# Patient Record
Sex: MALE | Race: WHITE | NOT HISPANIC OR LATINO | Employment: FULL TIME | URBAN - METROPOLITAN AREA
[De-identification: names, ages, dates, MRNs, and addresses within clinical notes are randomized per-mention and may not be internally consistent; named-entity substitution may affect disease eponyms.]

---

## 2023-08-21 PROBLEM — U07.1 COVID-19: Status: ACTIVE | Noted: 2023-08-21

## 2023-08-21 PROBLEM — E11.65 POORLY CONTROLLED DIABETES MELLITUS (MULTI): Status: RESOLVED | Noted: 2023-08-21 | Resolved: 2023-08-21

## 2023-08-21 PROBLEM — I10 HYPERTENSION: Status: ACTIVE | Noted: 2023-08-21

## 2023-08-21 PROBLEM — M62.89 HAMSTRING TIGHTNESS: Status: ACTIVE | Noted: 2023-08-21

## 2023-08-21 PROBLEM — E78.5 HYPERLIPIDEMIA: Status: ACTIVE | Noted: 2023-08-21

## 2023-08-21 PROBLEM — E66.9 OBESITY: Status: ACTIVE | Noted: 2023-08-21

## 2023-08-21 PROBLEM — M76.51 PATELLAR TENDINITIS OF RIGHT KNEE: Status: ACTIVE | Noted: 2023-08-21

## 2023-08-21 PROBLEM — E11.65 POORLY CONTROLLED DIABETES MELLITUS (MULTI): Status: ACTIVE | Noted: 2023-08-21

## 2023-08-21 PROBLEM — F41.9 ANXIETY: Status: ACTIVE | Noted: 2023-08-21

## 2023-08-21 PROBLEM — G47.33 OSA (OBSTRUCTIVE SLEEP APNEA): Status: ACTIVE | Noted: 2023-08-21

## 2023-08-21 PROBLEM — S83.001A ACQUIRED SUBLUXATION OF RIGHT PATELLA: Status: ACTIVE | Noted: 2023-08-21

## 2023-08-21 PROBLEM — M76.31 ILIOTIBIAL BAND SYNDROME AFFECTING RIGHT LOWER LEG: Status: ACTIVE | Noted: 2023-08-21

## 2023-08-21 PROBLEM — E11.9 TYPE 2 DIABETES MELLITUS (MULTI): Status: ACTIVE | Noted: 2023-08-21

## 2023-08-21 PROBLEM — M1A.0710 CHRONIC GOUT OF RIGHT FOOT: Status: ACTIVE | Noted: 2023-08-21

## 2023-08-21 PROBLEM — M21.70 LEG LENGTH INEQUALITY: Status: ACTIVE | Noted: 2023-08-21

## 2023-08-21 PROBLEM — G47.19 EXCESSIVE DAYTIME SLEEPINESS: Status: ACTIVE | Noted: 2023-08-21

## 2023-08-21 PROBLEM — Q66.6 VALGUS DEFORMITY OF BOTH FEET: Status: ACTIVE | Noted: 2023-08-21

## 2023-08-21 PROBLEM — E79.0 HYPERURICEMIA: Status: ACTIVE | Noted: 2023-08-21

## 2023-08-21 PROBLEM — H43.10 VITREOUS HEMORRHAGE (MULTI): Status: ACTIVE | Noted: 2023-08-21

## 2023-08-21 PROBLEM — E87.6 HYPOKALEMIA: Status: ACTIVE | Noted: 2023-08-21

## 2023-08-21 PROBLEM — M22.2X9 PATELLA-FEMORAL SYNDROME: Status: ACTIVE | Noted: 2023-08-21

## 2023-08-21 PROBLEM — D89.2 HYPERGAMMAGLOBULINEMIA: Status: ACTIVE | Noted: 2023-08-21

## 2023-08-21 PROBLEM — M25.561 PAIN IN RIGHT KNEE: Status: ACTIVE | Noted: 2023-08-21

## 2023-08-21 PROBLEM — S83.8X1A INJURY OF MENISCUS OF RIGHT KNEE: Status: ACTIVE | Noted: 2023-08-21

## 2023-08-21 RX ORDER — AMLODIPINE BESYLATE 5 MG/1
5 TABLET ORAL DAILY
COMMUNITY
End: 2023-11-27

## 2023-08-21 RX ORDER — DULAGLUTIDE 1.5 MG/.5ML
1.5 INJECTION, SOLUTION SUBCUTANEOUS
COMMUNITY

## 2023-08-21 RX ORDER — NAPROXEN SODIUM 220 MG/1
81 TABLET, FILM COATED ORAL DAILY
COMMUNITY

## 2023-08-21 RX ORDER — HYDROCHLOROTHIAZIDE 50 MG/1
100 TABLET ORAL DAILY
COMMUNITY
End: 2023-11-27

## 2023-08-21 RX ORDER — METFORMIN HYDROCHLORIDE 500 MG/1
1000 TABLET ORAL
COMMUNITY

## 2023-08-21 RX ORDER — BUSPIRONE HYDROCHLORIDE 10 MG/1
10 TABLET ORAL 2 TIMES DAILY
COMMUNITY
End: 2024-01-24 | Stop reason: SDUPTHER

## 2023-08-21 RX ORDER — ATORVASTATIN CALCIUM 10 MG/1
10 TABLET, FILM COATED ORAL DAILY
COMMUNITY
End: 2023-11-27

## 2023-08-21 RX ORDER — ALLOPURINOL 300 MG/1
300 TABLET ORAL DAILY
COMMUNITY

## 2023-08-21 RX ORDER — LANOLIN ALCOHOL/MO/W.PET/CERES
1000 CREAM (GRAM) TOPICAL DAILY
COMMUNITY

## 2023-08-21 RX ORDER — METOPROLOL SUCCINATE 50 MG/1
125 TABLET, EXTENDED RELEASE ORAL DAILY
COMMUNITY
End: 2023-10-24 | Stop reason: ALTCHOICE

## 2023-08-21 RX ORDER — POTASSIUM CHLORIDE 750 MG/1
10 TABLET, FILM COATED, EXTENDED RELEASE ORAL
COMMUNITY
End: 2023-11-27

## 2023-08-21 RX ORDER — LISINOPRIL 40 MG/1
40 TABLET ORAL DAILY
COMMUNITY
End: 2023-11-27

## 2023-08-21 RX ORDER — INSULIN DEGLUDEC 100 U/ML
50 INJECTION, SOLUTION SUBCUTANEOUS DAILY
COMMUNITY

## 2023-08-21 RX ORDER — FLUTICASONE PROPIONATE 50 MCG
1 SPRAY, SUSPENSION (ML) NASAL DAILY PRN
COMMUNITY
Start: 2021-01-07

## 2023-08-21 RX ORDER — CHOLECALCIFEROL (VITAMIN D3) 125 MCG
CAPSULE ORAL
COMMUNITY

## 2023-10-02 ENCOUNTER — PHARMACY VISIT (OUTPATIENT)
Dept: PHARMACY | Facility: CLINIC | Age: 53
End: 2023-10-02
Payer: MEDICARE

## 2023-10-02 PROCEDURE — RXMED WILLOW AMBULATORY MEDICATION CHARGE

## 2023-10-09 ENCOUNTER — APPOINTMENT (OUTPATIENT)
Dept: PRIMARY CARE | Facility: CLINIC | Age: 53
End: 2023-10-09
Payer: COMMERCIAL

## 2023-10-18 ENCOUNTER — PHARMACY VISIT (OUTPATIENT)
Dept: PHARMACY | Facility: CLINIC | Age: 53
End: 2023-10-18
Payer: MEDICARE

## 2023-10-18 PROCEDURE — RXMED WILLOW AMBULATORY MEDICATION CHARGE

## 2023-10-24 DIAGNOSIS — I10 PRIMARY HYPERTENSION: Primary | ICD-10-CM

## 2023-10-24 RX ORDER — METOPROLOL SUCCINATE 50 MG/1
50 TABLET, EXTENDED RELEASE ORAL DAILY
Qty: 90 TABLET | Refills: 1 | Status: SHIPPED | OUTPATIENT
Start: 2023-10-24 | End: 2023-10-31 | Stop reason: SDUPTHER

## 2023-10-24 RX ORDER — METOPROLOL SUCCINATE 50 MG/1
TABLET, EXTENDED RELEASE ORAL
Qty: 225 TABLET | Refills: 1 | Status: SHIPPED | OUTPATIENT
Start: 2023-10-24 | End: 2023-10-24 | Stop reason: ALTCHOICE

## 2023-10-30 ENCOUNTER — PHARMACY VISIT (OUTPATIENT)
Dept: PHARMACY | Facility: CLINIC | Age: 53
End: 2023-10-30
Payer: MEDICARE

## 2023-10-30 PROCEDURE — RXMED WILLOW AMBULATORY MEDICATION CHARGE

## 2023-10-31 DIAGNOSIS — I10 PRIMARY HYPERTENSION: ICD-10-CM

## 2023-11-01 RX ORDER — METOPROLOL SUCCINATE 50 MG/1
125 TABLET, EXTENDED RELEASE ORAL DAILY
Qty: 225 TABLET | Refills: 1 | Status: SHIPPED | OUTPATIENT
Start: 2023-11-01 | End: 2024-04-24

## 2023-11-07 ENCOUNTER — TELEPHONE (OUTPATIENT)
Dept: CARE COORDINATION | Facility: CLINIC | Age: 53
End: 2023-11-07
Payer: COMMERCIAL

## 2023-11-07 NOTE — PROGRESS NOTES
Left VM requesting return call regarding referral from Dr. Barnett for DSME; contact info provided

## 2023-11-13 ENCOUNTER — PHARMACY VISIT (OUTPATIENT)
Dept: PHARMACY | Facility: CLINIC | Age: 53
End: 2023-11-13
Payer: MEDICARE

## 2023-11-13 DIAGNOSIS — Z79.4 TYPE 2 DIABETES MELLITUS WITHOUT COMPLICATION, WITH LONG-TERM CURRENT USE OF INSULIN (MULTI): Primary | ICD-10-CM

## 2023-11-13 DIAGNOSIS — E11.9 TYPE 2 DIABETES MELLITUS WITHOUT COMPLICATION, WITH LONG-TERM CURRENT USE OF INSULIN (MULTI): Primary | ICD-10-CM

## 2023-11-13 PROCEDURE — RXMED WILLOW AMBULATORY MEDICATION CHARGE

## 2023-11-13 RX ORDER — INSULIN DEGLUDEC 100 U/ML
INJECTION, SOLUTION SUBCUTANEOUS
Qty: 45 ML | Refills: 1 | Status: SHIPPED | OUTPATIENT
Start: 2023-11-13 | End: 2024-05-23 | Stop reason: SDUPTHER

## 2023-11-15 ENCOUNTER — APPOINTMENT (OUTPATIENT)
Dept: PRIMARY CARE | Facility: CLINIC | Age: 53
End: 2023-11-15
Payer: COMMERCIAL

## 2023-11-20 ENCOUNTER — PHARMACY VISIT (OUTPATIENT)
Dept: PHARMACY | Facility: CLINIC | Age: 53
End: 2023-11-20
Payer: MEDICARE

## 2023-11-20 PROCEDURE — RXMED WILLOW AMBULATORY MEDICATION CHARGE

## 2023-11-24 DIAGNOSIS — I10 ESSENTIAL HYPERTENSION: Primary | ICD-10-CM

## 2023-11-26 DIAGNOSIS — I10 PRIMARY HYPERTENSION: Primary | ICD-10-CM

## 2023-11-27 RX ORDER — HYDROCHLOROTHIAZIDE 50 MG/1
100 TABLET ORAL DAILY
Qty: 180 TABLET | Refills: 0 | Status: SHIPPED | OUTPATIENT
Start: 2023-11-27 | End: 2024-02-20

## 2023-11-27 RX ORDER — ATORVASTATIN CALCIUM 10 MG/1
10 TABLET, FILM COATED ORAL DAILY
Qty: 90 TABLET | Refills: 0 | Status: SHIPPED | OUTPATIENT
Start: 2023-11-27 | End: 2024-02-20

## 2023-11-27 RX ORDER — LISINOPRIL 40 MG/1
40 TABLET ORAL DAILY
Qty: 90 TABLET | Refills: 0 | Status: SHIPPED | OUTPATIENT
Start: 2023-11-27 | End: 2024-02-20

## 2023-11-27 RX ORDER — POTASSIUM CHLORIDE 750 MG/1
10 TABLET, EXTENDED RELEASE ORAL
Qty: 180 TABLET | Refills: 0 | Status: SHIPPED | OUTPATIENT
Start: 2023-11-27 | End: 2024-02-20

## 2023-11-27 RX ORDER — AMLODIPINE BESYLATE 5 MG/1
5 TABLET ORAL DAILY
Qty: 90 TABLET | Refills: 0 | Status: SHIPPED | OUTPATIENT
Start: 2023-11-27 | End: 2024-02-20

## 2023-12-11 PROCEDURE — RXMED WILLOW AMBULATORY MEDICATION CHARGE

## 2023-12-12 ENCOUNTER — PHARMACY VISIT (OUTPATIENT)
Dept: PHARMACY | Facility: CLINIC | Age: 53
End: 2023-12-12
Payer: MEDICARE

## 2023-12-18 ENCOUNTER — APPOINTMENT (OUTPATIENT)
Dept: PRIMARY CARE | Facility: CLINIC | Age: 53
End: 2023-12-18
Payer: COMMERCIAL

## 2023-12-21 ENCOUNTER — PHARMACY VISIT (OUTPATIENT)
Dept: PHARMACY | Facility: CLINIC | Age: 53
End: 2023-12-21
Payer: MEDICARE

## 2023-12-21 PROCEDURE — RXMED WILLOW AMBULATORY MEDICATION CHARGE

## 2023-12-26 DIAGNOSIS — Z00.00 ROUTINE GENERAL MEDICAL EXAMINATION AT A HEALTH CARE FACILITY: Primary | ICD-10-CM

## 2023-12-26 PROCEDURE — RXMED WILLOW AMBULATORY MEDICATION CHARGE

## 2023-12-26 RX ORDER — CLOTRIMAZOLE AND BETAMETHASONE DIPROPIONATE 10; .64 MG/G; MG/G
CREAM TOPICAL
Qty: 45 G | Refills: 2 | Status: SHIPPED | OUTPATIENT
Start: 2023-12-26 | End: 2024-12-25

## 2023-12-27 ENCOUNTER — PHARMACY VISIT (OUTPATIENT)
Dept: PHARMACY | Facility: CLINIC | Age: 53
End: 2023-12-27
Payer: MEDICARE

## 2024-01-08 ENCOUNTER — PHARMACY VISIT (OUTPATIENT)
Dept: PHARMACY | Facility: CLINIC | Age: 54
End: 2024-01-08
Payer: MEDICARE

## 2024-01-08 PROCEDURE — RXMED WILLOW AMBULATORY MEDICATION CHARGE

## 2024-01-22 ENCOUNTER — APPOINTMENT (OUTPATIENT)
Dept: PRIMARY CARE | Facility: CLINIC | Age: 54
End: 2024-01-22
Payer: COMMERCIAL

## 2024-01-24 DIAGNOSIS — F41.9 ANXIETY: Primary | ICD-10-CM

## 2024-01-24 RX ORDER — BUSPIRONE HYDROCHLORIDE 10 MG/1
10 TABLET ORAL 2 TIMES DAILY
Qty: 90 TABLET | Refills: 0 | Status: SHIPPED | OUTPATIENT
Start: 2024-01-24 | End: 2024-04-02

## 2024-02-07 ENCOUNTER — HOSPITAL ENCOUNTER (OUTPATIENT)
Dept: RADIOLOGY | Facility: EXTERNAL LOCATION | Age: 54
Discharge: HOME | End: 2024-02-07

## 2024-02-07 DIAGNOSIS — M25.511 RIGHT SHOULDER PAIN, UNSPECIFIED CHRONICITY: ICD-10-CM

## 2024-02-07 PROCEDURE — RXMED WILLOW AMBULATORY MEDICATION CHARGE

## 2024-02-08 ENCOUNTER — PHARMACY VISIT (OUTPATIENT)
Dept: PHARMACY | Facility: CLINIC | Age: 54
End: 2024-02-08
Payer: MEDICARE

## 2024-02-08 DIAGNOSIS — E11.9 TYPE 2 DIABETES MELLITUS WITHOUT COMPLICATION, WITHOUT LONG-TERM CURRENT USE OF INSULIN (MULTI): Primary | ICD-10-CM

## 2024-02-09 ENCOUNTER — PHARMACY VISIT (OUTPATIENT)
Dept: PHARMACY | Facility: CLINIC | Age: 54
End: 2024-02-09
Payer: MEDICARE

## 2024-02-09 PROCEDURE — RXMED WILLOW AMBULATORY MEDICATION CHARGE

## 2024-02-09 RX ORDER — DULAGLUTIDE 3 MG/.5ML
3 INJECTION, SOLUTION SUBCUTANEOUS
Qty: 6 ML | Refills: 1 | Status: SHIPPED | OUTPATIENT
Start: 2024-02-09 | End: 2025-02-08

## 2024-02-12 PROCEDURE — RXMED WILLOW AMBULATORY MEDICATION CHARGE

## 2024-02-13 ENCOUNTER — PHARMACY VISIT (OUTPATIENT)
Dept: PHARMACY | Facility: CLINIC | Age: 54
End: 2024-02-13
Payer: MEDICARE

## 2024-02-20 DIAGNOSIS — I10 PRIMARY HYPERTENSION: ICD-10-CM

## 2024-02-20 DIAGNOSIS — I10 ESSENTIAL HYPERTENSION: ICD-10-CM

## 2024-02-20 RX ORDER — ATORVASTATIN CALCIUM 10 MG/1
10 TABLET, FILM COATED ORAL DAILY
Qty: 90 TABLET | Refills: 0 | Status: SHIPPED | OUTPATIENT
Start: 2024-02-20 | End: 2024-05-06

## 2024-02-20 RX ORDER — LISINOPRIL 40 MG/1
40 TABLET ORAL DAILY
Qty: 90 TABLET | Refills: 0 | Status: SHIPPED | OUTPATIENT
Start: 2024-02-20 | End: 2024-05-06

## 2024-02-20 RX ORDER — HYDROCHLOROTHIAZIDE 50 MG/1
100 TABLET ORAL DAILY
Qty: 180 TABLET | Refills: 0 | Status: SHIPPED | OUTPATIENT
Start: 2024-02-20 | End: 2024-05-06

## 2024-02-20 RX ORDER — AMLODIPINE BESYLATE 5 MG/1
5 TABLET ORAL DAILY
Qty: 90 TABLET | Refills: 0 | Status: SHIPPED | OUTPATIENT
Start: 2024-02-20 | End: 2024-05-06

## 2024-02-20 RX ORDER — POTASSIUM CHLORIDE 750 MG/1
10 TABLET, EXTENDED RELEASE ORAL
Qty: 180 TABLET | Refills: 0 | Status: SHIPPED | OUTPATIENT
Start: 2024-02-20 | End: 2024-06-03

## 2024-02-29 ENCOUNTER — PHARMACY VISIT (OUTPATIENT)
Dept: PHARMACY | Facility: CLINIC | Age: 54
End: 2024-02-29

## 2024-02-29 PROCEDURE — RXMED WILLOW AMBULATORY MEDICATION CHARGE

## 2024-03-05 ENCOUNTER — APPOINTMENT (OUTPATIENT)
Dept: PRIMARY CARE | Facility: CLINIC | Age: 54
End: 2024-03-05
Payer: COMMERCIAL

## 2024-03-19 PROCEDURE — RXMED WILLOW AMBULATORY MEDICATION CHARGE

## 2024-03-20 ENCOUNTER — PHARMACY VISIT (OUTPATIENT)
Dept: PHARMACY | Facility: CLINIC | Age: 54
End: 2024-03-20
Payer: COMMERCIAL

## 2024-03-20 PROCEDURE — RXMED WILLOW AMBULATORY MEDICATION CHARGE

## 2024-04-01 DIAGNOSIS — F41.9 ANXIETY: ICD-10-CM

## 2024-04-01 DIAGNOSIS — M1A.0710 CHRONIC GOUT OF RIGHT FOOT, UNSPECIFIED CAUSE: Primary | ICD-10-CM

## 2024-04-01 DIAGNOSIS — E11.9 TYPE 2 DIABETES MELLITUS WITHOUT COMPLICATION, WITHOUT LONG-TERM CURRENT USE OF INSULIN (MULTI): ICD-10-CM

## 2024-04-02 RX ORDER — METFORMIN HYDROCHLORIDE 500 MG/1
1000 TABLET ORAL
Qty: 360 TABLET | Refills: 1 | Status: SHIPPED | OUTPATIENT
Start: 2024-04-02

## 2024-04-02 RX ORDER — BUSPIRONE HYDROCHLORIDE 10 MG/1
10 TABLET ORAL 2 TIMES DAILY
Qty: 180 TABLET | Refills: 0 | Status: SHIPPED | OUTPATIENT
Start: 2024-04-02

## 2024-04-02 RX ORDER — ALLOPURINOL 300 MG/1
300 TABLET ORAL DAILY
Qty: 90 TABLET | Refills: 1 | Status: SHIPPED | OUTPATIENT
Start: 2024-04-02

## 2024-04-08 ENCOUNTER — APPOINTMENT (OUTPATIENT)
Dept: PRIMARY CARE | Facility: CLINIC | Age: 54
End: 2024-04-08
Payer: COMMERCIAL

## 2024-04-23 DIAGNOSIS — I10 PRIMARY HYPERTENSION: ICD-10-CM

## 2024-04-24 RX ORDER — METOPROLOL SUCCINATE 50 MG/1
TABLET, EXTENDED RELEASE ORAL
Qty: 225 TABLET | Refills: 1 | Status: SHIPPED | OUTPATIENT
Start: 2024-04-24

## 2024-04-25 PROCEDURE — RXMED WILLOW AMBULATORY MEDICATION CHARGE

## 2024-04-26 ENCOUNTER — PHARMACY VISIT (OUTPATIENT)
Dept: PHARMACY | Facility: CLINIC | Age: 54
End: 2024-04-26
Payer: MEDICARE

## 2024-04-29 ENCOUNTER — TELEPHONE (OUTPATIENT)
Dept: CARE COORDINATION | Facility: CLINIC | Age: 54
End: 2024-04-29
Payer: COMMERCIAL

## 2024-04-29 ENCOUNTER — DOCUMENTATION (OUTPATIENT)
Dept: CARE COORDINATION | Facility: CLINIC | Age: 54
End: 2024-04-29
Payer: COMMERCIAL

## 2024-04-29 NOTE — PROGRESS NOTES
2nd call attempt:  LM requesting return call regarding referral from PCP Dr. Barnett for outpatient DSME; contact info provided.

## 2024-05-05 DIAGNOSIS — I10 ESSENTIAL HYPERTENSION: ICD-10-CM

## 2024-05-05 DIAGNOSIS — I10 PRIMARY HYPERTENSION: ICD-10-CM

## 2024-05-06 RX ORDER — LISINOPRIL 40 MG/1
40 TABLET ORAL DAILY
Qty: 90 TABLET | Refills: 0 | Status: SHIPPED | OUTPATIENT
Start: 2024-05-06

## 2024-05-06 RX ORDER — ATORVASTATIN CALCIUM 10 MG/1
10 TABLET, FILM COATED ORAL DAILY
Qty: 90 TABLET | Refills: 0 | Status: SHIPPED | OUTPATIENT
Start: 2024-05-06

## 2024-05-06 RX ORDER — AMLODIPINE BESYLATE 5 MG/1
5 TABLET ORAL DAILY
Qty: 90 TABLET | Refills: 0 | Status: SHIPPED | OUTPATIENT
Start: 2024-05-06

## 2024-05-06 RX ORDER — HYDROCHLOROTHIAZIDE 50 MG/1
100 TABLET ORAL DAILY
Qty: 180 TABLET | Refills: 0 | Status: SHIPPED | OUTPATIENT
Start: 2024-05-06

## 2024-05-13 ENCOUNTER — APPOINTMENT (OUTPATIENT)
Dept: PRIMARY CARE | Facility: CLINIC | Age: 54
End: 2024-05-13
Payer: COMMERCIAL

## 2024-05-23 ENCOUNTER — PHARMACY VISIT (OUTPATIENT)
Dept: PHARMACY | Facility: CLINIC | Age: 54
End: 2024-05-23
Payer: COMMERCIAL

## 2024-05-23 DIAGNOSIS — E11.9 TYPE 2 DIABETES MELLITUS WITHOUT COMPLICATION, WITH LONG-TERM CURRENT USE OF INSULIN (MULTI): ICD-10-CM

## 2024-05-23 DIAGNOSIS — Z79.4 TYPE 2 DIABETES MELLITUS WITHOUT COMPLICATION, WITH LONG-TERM CURRENT USE OF INSULIN (MULTI): ICD-10-CM

## 2024-05-23 PROCEDURE — RXMED WILLOW AMBULATORY MEDICATION CHARGE

## 2024-05-23 RX ORDER — INSULIN DEGLUDEC 100 U/ML
INJECTION, SOLUTION SUBCUTANEOUS
Qty: 45 ML | Refills: 1 | Status: SHIPPED | OUTPATIENT
Start: 2024-05-23 | End: 2025-05-23

## 2024-06-03 ENCOUNTER — DOCUMENTATION (OUTPATIENT)
Dept: CARE COORDINATION | Facility: CLINIC | Age: 54
End: 2024-06-03
Payer: COMMERCIAL

## 2024-06-03 DIAGNOSIS — I10 ESSENTIAL HYPERTENSION: ICD-10-CM

## 2024-06-03 RX ORDER — POTASSIUM CHLORIDE 750 MG/1
10 TABLET, EXTENDED RELEASE ORAL
Qty: 180 TABLET | Refills: 2 | Status: SHIPPED | OUTPATIENT
Start: 2024-06-03

## 2024-06-03 NOTE — PROGRESS NOTES
Referral closed at this time due to no response from patient from outreach attempts. Educator remains available as needed.

## 2024-06-13 DIAGNOSIS — F41.9 ANXIETY: ICD-10-CM

## 2024-06-14 RX ORDER — BUSPIRONE HYDROCHLORIDE 10 MG/1
10 TABLET ORAL 2 TIMES DAILY
Qty: 180 TABLET | Refills: 0 | Status: SHIPPED | OUTPATIENT
Start: 2024-06-14

## 2024-06-24 ENCOUNTER — APPOINTMENT (OUTPATIENT)
Dept: PRIMARY CARE | Facility: CLINIC | Age: 54
End: 2024-06-24
Payer: COMMERCIAL

## 2024-07-25 ENCOUNTER — PHARMACY VISIT (OUTPATIENT)
Dept: PHARMACY | Facility: CLINIC | Age: 54
End: 2024-07-25
Payer: COMMERCIAL

## 2024-07-25 PROCEDURE — RXMED WILLOW AMBULATORY MEDICATION CHARGE

## 2024-07-26 DIAGNOSIS — I10 PRIMARY HYPERTENSION: ICD-10-CM

## 2024-07-26 DIAGNOSIS — I10 ESSENTIAL HYPERTENSION: ICD-10-CM

## 2024-07-29 ENCOUNTER — APPOINTMENT (OUTPATIENT)
Dept: PRIMARY CARE | Facility: CLINIC | Age: 54
End: 2024-07-29
Payer: COMMERCIAL

## 2024-07-29 RX ORDER — HYDROCHLOROTHIAZIDE 50 MG/1
100 TABLET ORAL DAILY
Qty: 180 TABLET | Refills: 0 | Status: SHIPPED | OUTPATIENT
Start: 2024-07-29

## 2024-07-29 RX ORDER — ATORVASTATIN CALCIUM 10 MG/1
10 TABLET, FILM COATED ORAL DAILY
Qty: 90 TABLET | Refills: 0 | Status: SHIPPED | OUTPATIENT
Start: 2024-07-29

## 2024-07-29 RX ORDER — AMLODIPINE BESYLATE 5 MG/1
5 TABLET ORAL DAILY
Qty: 90 TABLET | Refills: 0 | Status: SHIPPED | OUTPATIENT
Start: 2024-07-29

## 2024-07-29 RX ORDER — LISINOPRIL 40 MG/1
40 TABLET ORAL DAILY
Qty: 90 TABLET | Refills: 0 | Status: SHIPPED | OUTPATIENT
Start: 2024-07-29

## 2024-08-12 ENCOUNTER — APPOINTMENT (OUTPATIENT)
Dept: CARDIOLOGY | Facility: HOSPITAL | Age: 54
End: 2024-08-12
Payer: COMMERCIAL

## 2024-08-12 ENCOUNTER — APPOINTMENT (OUTPATIENT)
Dept: RADIOLOGY | Facility: HOSPITAL | Age: 54
End: 2024-08-12
Payer: COMMERCIAL

## 2024-08-12 ENCOUNTER — HOSPITAL ENCOUNTER (INPATIENT)
Facility: HOSPITAL | Age: 54
LOS: 4 days | Discharge: HOME | End: 2024-08-16
Attending: STUDENT IN AN ORGANIZED HEALTH CARE EDUCATION/TRAINING PROGRAM | Admitting: STUDENT IN AN ORGANIZED HEALTH CARE EDUCATION/TRAINING PROGRAM
Payer: COMMERCIAL

## 2024-08-12 DIAGNOSIS — R73.9 HYPERGLYCEMIA: ICD-10-CM

## 2024-08-12 DIAGNOSIS — B37.2 YEAST INFECTION OF THE SKIN: ICD-10-CM

## 2024-08-12 DIAGNOSIS — R09.81 NASAL CONGESTION: Primary | ICD-10-CM

## 2024-08-12 DIAGNOSIS — A41.9 SEPSIS, DUE TO UNSPECIFIED ORGANISM, UNSPECIFIED WHETHER ACUTE ORGAN DYSFUNCTION PRESENT (MULTI): ICD-10-CM

## 2024-08-12 DIAGNOSIS — N30.00 ACUTE CYSTITIS WITHOUT HEMATURIA: Chronic | ICD-10-CM

## 2024-08-12 DIAGNOSIS — I10 PRIMARY HYPERTENSION: ICD-10-CM

## 2024-08-12 DIAGNOSIS — J18.9 PNEUMONIA DUE TO INFECTIOUS ORGANISM, UNSPECIFIED LATERALITY, UNSPECIFIED PART OF LUNG: Primary | ICD-10-CM

## 2024-08-12 PROBLEM — E11.65 HYPERGLYCEMIA DUE TO DIABETES MELLITUS (MULTI): Status: ACTIVE | Noted: 2024-08-12

## 2024-08-12 LAB
ALBUMIN SERPL-MCNC: 3.4 G/DL (ref 3.5–5)
ALP BLD-CCNC: 96 U/L (ref 35–125)
ALT SERPL-CCNC: 19 U/L (ref 5–40)
ANION GAP SERPL CALC-SCNC: 17 MMOL/L
APPEARANCE UR: ABNORMAL
AST SERPL-CCNC: 18 U/L (ref 5–40)
B-OH-BUTYR+ACETOACET BLD-SCNC: 1.5 MMOL/L (ref 0.1–0.3)
BACTERIA #/AREA URNS AUTO: ABNORMAL /HPF
BASE EXCESS BLDV CALC-SCNC: 0.4 MMOL/L (ref -2–3)
BASOPHILS # BLD AUTO: 0.05 X10*3/UL (ref 0–0.1)
BASOPHILS NFR BLD AUTO: 0.3 %
BILIRUB SERPL-MCNC: 1 MG/DL (ref 0.1–1.2)
BILIRUB UR STRIP.AUTO-MCNC: NEGATIVE MG/DL
BODY TEMPERATURE: 37 DEGREES CELSIUS
BUN SERPL-MCNC: 29 MG/DL (ref 8–25)
CALCIUM SERPL-MCNC: 9.1 MG/DL (ref 8.5–10.4)
CHLORIDE SERPL-SCNC: 86 MMOL/L (ref 97–107)
CO2 SERPL-SCNC: 20 MMOL/L (ref 24–31)
COLOR UR: YELLOW
CREAT SERPL-MCNC: 1.5 MG/DL (ref 0.4–1.6)
EGFRCR SERPLBLD CKD-EPI 2021: 54 ML/MIN/1.73M*2
EOSINOPHIL # BLD AUTO: 0 X10*3/UL (ref 0–0.7)
EOSINOPHIL NFR BLD AUTO: 0 %
ERYTHROCYTE [DISTWIDTH] IN BLOOD BY AUTOMATED COUNT: 14 % (ref 11.5–14.5)
FLUAV RNA RESP QL NAA+PROBE: NOT DETECTED
FLUBV RNA RESP QL NAA+PROBE: NOT DETECTED
GLUCOSE BLD MANUAL STRIP-MCNC: 464 MG/DL (ref 74–99)
GLUCOSE BLD MANUAL STRIP-MCNC: 521 MG/DL (ref 74–99)
GLUCOSE BLD MANUAL STRIP-MCNC: 526 MG/DL (ref 74–99)
GLUCOSE SERPL-MCNC: 546 MG/DL (ref 65–99)
GLUCOSE UR STRIP.AUTO-MCNC: ABNORMAL MG/DL
HCO3 BLDV-SCNC: 22.9 MMOL/L (ref 22–26)
HCT VFR BLD AUTO: 39.7 % (ref 41–52)
HGB BLD-MCNC: 13.3 G/DL (ref 13.5–17.5)
IMM GRANULOCYTES # BLD AUTO: 0.09 X10*3/UL (ref 0–0.7)
IMM GRANULOCYTES NFR BLD AUTO: 0.5 % (ref 0–0.9)
INHALED O2 CONCENTRATION: 21 %
KETONES UR STRIP.AUTO-MCNC: ABNORMAL MG/DL
LACTATE BLDV-SCNC: 2.4 MMOL/L (ref 0.4–2)
LEUKOCYTE ESTERASE UR QL STRIP.AUTO: ABNORMAL
LIPASE SERPL-CCNC: 59 U/L (ref 16–63)
LYMPHOCYTES # BLD AUTO: 0.46 X10*3/UL (ref 1.2–4.8)
LYMPHOCYTES NFR BLD AUTO: 2.6 %
MAGNESIUM SERPL-MCNC: 1.3 MG/DL (ref 1.6–3.1)
MCH RBC QN AUTO: 27.3 PG (ref 26–34)
MCHC RBC AUTO-ENTMCNC: 33.5 G/DL (ref 32–36)
MCV RBC AUTO: 82 FL (ref 80–100)
MONOCYTES # BLD AUTO: 1.1 X10*3/UL (ref 0.1–1)
MONOCYTES NFR BLD AUTO: 6.3 %
MUCOUS THREADS #/AREA URNS AUTO: ABNORMAL /LPF
NEUTROPHILS # BLD AUTO: 15.71 X10*3/UL (ref 1.2–7.7)
NEUTROPHILS NFR BLD AUTO: 90.3 %
NITRITE UR QL STRIP.AUTO: NEGATIVE
NRBC BLD-RTO: 0 /100 WBCS (ref 0–0)
NT-PROBNP SERPL-MCNC: 755 PG/ML (ref 0–177)
OXYHGB MFR BLDV: 87.6 % (ref 45–75)
PCO2 BLDV: 30 MM HG (ref 41–51)
PH BLDV: 7.49 PH (ref 7.33–7.43)
PH UR STRIP.AUTO: 6 [PH]
PLATELET # BLD AUTO: 165 X10*3/UL (ref 150–450)
PO2 BLDV: 56 MM HG (ref 35–45)
POTASSIUM SERPL-SCNC: 4 MMOL/L (ref 3.4–5.1)
PROT SERPL-MCNC: 8.1 G/DL (ref 5.9–7.9)
PROT UR STRIP.AUTO-MCNC: ABNORMAL MG/DL
RBC # BLD AUTO: 4.87 X10*6/UL (ref 4.5–5.9)
RBC # UR STRIP.AUTO: ABNORMAL /UL
RBC #/AREA URNS AUTO: >20 /HPF
SAO2 % BLDV: 90 % (ref 45–75)
SARS-COV-2 RNA RESP QL NAA+PROBE: NOT DETECTED
SODIUM SERPL-SCNC: 123 MMOL/L (ref 133–145)
SP GR UR STRIP.AUTO: 1.02
SQUAMOUS #/AREA URNS AUTO: ABNORMAL /HPF
TROPONIN T SERPL-MCNC: 27 NG/L
UROBILINOGEN UR STRIP.AUTO-MCNC: NORMAL MG/DL
WBC # BLD AUTO: 17.4 X10*3/UL (ref 4.4–11.3)
WBC #/AREA URNS AUTO: >50 /HPF
WBC CLUMPS #/AREA URNS AUTO: ABNORMAL /HPF

## 2024-08-12 PROCEDURE — 85025 COMPLETE CBC W/AUTO DIFF WBC: CPT | Performed by: PHYSICIAN ASSISTANT

## 2024-08-12 PROCEDURE — 83690 ASSAY OF LIPASE: CPT | Performed by: PHYSICIAN ASSISTANT

## 2024-08-12 PROCEDURE — 36415 COLL VENOUS BLD VENIPUNCTURE: CPT | Performed by: PHYSICIAN ASSISTANT

## 2024-08-12 PROCEDURE — 93005 ELECTROCARDIOGRAM TRACING: CPT

## 2024-08-12 PROCEDURE — 82805 BLOOD GASES W/O2 SATURATION: CPT | Performed by: PHYSICIAN ASSISTANT

## 2024-08-12 PROCEDURE — 96374 THER/PROPH/DIAG INJ IV PUSH: CPT

## 2024-08-12 PROCEDURE — 87040 BLOOD CULTURE FOR BACTERIA: CPT | Mod: WESLAB | Performed by: PHYSICIAN ASSISTANT

## 2024-08-12 PROCEDURE — 87086 URINE CULTURE/COLONY COUNT: CPT | Mod: WESLAB | Performed by: PHYSICIAN ASSISTANT

## 2024-08-12 PROCEDURE — 96375 TX/PRO/DX INJ NEW DRUG ADDON: CPT

## 2024-08-12 PROCEDURE — 83605 ASSAY OF LACTIC ACID: CPT | Performed by: PHYSICIAN ASSISTANT

## 2024-08-12 PROCEDURE — 2500000002 HC RX 250 W HCPCS SELF ADMINISTERED DRUGS (ALT 637 FOR MEDICARE OP, ALT 636 FOR OP/ED): Performed by: PHYSICIAN ASSISTANT

## 2024-08-12 PROCEDURE — 2500000004 HC RX 250 GENERAL PHARMACY W/ HCPCS (ALT 636 FOR OP/ED): Performed by: NURSE PRACTITIONER

## 2024-08-12 PROCEDURE — 99285 EMERGENCY DEPT VISIT HI MDM: CPT | Mod: 25

## 2024-08-12 PROCEDURE — 82947 ASSAY GLUCOSE BLOOD QUANT: CPT

## 2024-08-12 PROCEDURE — 83880 ASSAY OF NATRIURETIC PEPTIDE: CPT | Performed by: PHYSICIAN ASSISTANT

## 2024-08-12 PROCEDURE — 80053 COMPREHEN METABOLIC PANEL: CPT | Performed by: PHYSICIAN ASSISTANT

## 2024-08-12 PROCEDURE — 71045 X-RAY EXAM CHEST 1 VIEW: CPT | Performed by: STUDENT IN AN ORGANIZED HEALTH CARE EDUCATION/TRAINING PROGRAM

## 2024-08-12 PROCEDURE — 2500000004 HC RX 250 GENERAL PHARMACY W/ HCPCS (ALT 636 FOR OP/ED): Performed by: PHYSICIAN ASSISTANT

## 2024-08-12 PROCEDURE — 2500000001 HC RX 250 WO HCPCS SELF ADMINISTERED DRUGS (ALT 637 FOR MEDICARE OP): Performed by: NURSE PRACTITIONER

## 2024-08-12 PROCEDURE — 81003 URINALYSIS AUTO W/O SCOPE: CPT | Performed by: PHYSICIAN ASSISTANT

## 2024-08-12 PROCEDURE — 87636 SARSCOV2 & INF A&B AMP PRB: CPT | Performed by: PHYSICIAN ASSISTANT

## 2024-08-12 PROCEDURE — 2060000001 HC INTERMEDIATE ICU ROOM DAILY

## 2024-08-12 PROCEDURE — 2500000001 HC RX 250 WO HCPCS SELF ADMINISTERED DRUGS (ALT 637 FOR MEDICARE OP): Performed by: PHYSICIAN ASSISTANT

## 2024-08-12 PROCEDURE — 96367 TX/PROPH/DG ADDL SEQ IV INF: CPT

## 2024-08-12 PROCEDURE — 2500000002 HC RX 250 W HCPCS SELF ADMINISTERED DRUGS (ALT 637 FOR MEDICARE OP, ALT 636 FOR OP/ED): Performed by: INTERNAL MEDICINE

## 2024-08-12 PROCEDURE — 96366 THER/PROPH/DIAG IV INF ADDON: CPT

## 2024-08-12 PROCEDURE — 71045 X-RAY EXAM CHEST 1 VIEW: CPT

## 2024-08-12 PROCEDURE — 82010 KETONE BODYS QUAN: CPT | Performed by: PHYSICIAN ASSISTANT

## 2024-08-12 PROCEDURE — 83735 ASSAY OF MAGNESIUM: CPT | Performed by: PHYSICIAN ASSISTANT

## 2024-08-12 PROCEDURE — 84484 ASSAY OF TROPONIN QUANT: CPT | Performed by: PHYSICIAN ASSISTANT

## 2024-08-12 PROCEDURE — 2500000002 HC RX 250 W HCPCS SELF ADMINISTERED DRUGS (ALT 637 FOR MEDICARE OP, ALT 636 FOR OP/ED): Performed by: NURSE PRACTITIONER

## 2024-08-12 RX ORDER — GUAIFENESIN 600 MG/1
600 TABLET, EXTENDED RELEASE ORAL 2 TIMES DAILY
Status: DISCONTINUED | OUTPATIENT
Start: 2024-08-12 | End: 2024-08-14

## 2024-08-12 RX ORDER — NAPROXEN SODIUM 220 MG/1
81 TABLET, FILM COATED ORAL DAILY
Status: DISCONTINUED | OUTPATIENT
Start: 2024-08-13 | End: 2024-08-16 | Stop reason: HOSPADM

## 2024-08-12 RX ORDER — IBUPROFEN 600 MG/1
600 TABLET ORAL ONCE
Status: COMPLETED | OUTPATIENT
Start: 2024-08-12 | End: 2024-08-12

## 2024-08-12 RX ORDER — ONDANSETRON 4 MG/1
4 TABLET, FILM COATED ORAL EVERY 8 HOURS PRN
Status: DISCONTINUED | OUTPATIENT
Start: 2024-08-12 | End: 2024-08-16 | Stop reason: HOSPADM

## 2024-08-12 RX ORDER — ALLOPURINOL 300 MG/1
300 TABLET ORAL NIGHTLY
Status: DISCONTINUED | OUTPATIENT
Start: 2024-08-12 | End: 2024-08-16 | Stop reason: HOSPADM

## 2024-08-12 RX ORDER — FLUTICASONE PROPIONATE 50 MCG
1 SPRAY, SUSPENSION (ML) NASAL DAILY
Status: DISCONTINUED | OUTPATIENT
Start: 2024-08-12 | End: 2024-08-15

## 2024-08-12 RX ORDER — ATORVASTATIN CALCIUM 10 MG/1
10 TABLET, FILM COATED ORAL NIGHTLY
Status: DISCONTINUED | OUTPATIENT
Start: 2024-08-12 | End: 2024-08-16 | Stop reason: HOSPADM

## 2024-08-12 RX ORDER — ACETAMINOPHEN 325 MG/1
650 TABLET ORAL EVERY 4 HOURS PRN
Status: DISCONTINUED | OUTPATIENT
Start: 2024-08-12 | End: 2024-08-16 | Stop reason: HOSPADM

## 2024-08-12 RX ORDER — NYSTATIN 100000 [USP'U]/G
1 POWDER TOPICAL 2 TIMES DAILY
Status: DISCONTINUED | OUTPATIENT
Start: 2024-08-12 | End: 2024-08-16 | Stop reason: HOSPADM

## 2024-08-12 RX ORDER — POLYETHYLENE GLYCOL 3350 17 G/17G
17 POWDER, FOR SOLUTION ORAL DAILY PRN
Status: DISCONTINUED | OUTPATIENT
Start: 2024-08-12 | End: 2024-08-16 | Stop reason: HOSPADM

## 2024-08-12 RX ORDER — DEXTROSE 50 % IN WATER (D50W) INTRAVENOUS SYRINGE
25
Status: DISCONTINUED | OUTPATIENT
Start: 2024-08-12 | End: 2024-08-16 | Stop reason: HOSPADM

## 2024-08-12 RX ORDER — SODIUM CHLORIDE 9 MG/ML
75 INJECTION, SOLUTION INTRAVENOUS CONTINUOUS
Status: DISCONTINUED | OUTPATIENT
Start: 2024-08-12 | End: 2024-08-15

## 2024-08-12 RX ORDER — INSULIN GLARGINE 100 [IU]/ML
40 INJECTION, SOLUTION SUBCUTANEOUS NIGHTLY
Status: DISCONTINUED | OUTPATIENT
Start: 2024-08-12 | End: 2024-08-13

## 2024-08-12 RX ORDER — FLUCONAZOLE 150 MG/1
150 TABLET ORAL ONCE
Status: COMPLETED | OUTPATIENT
Start: 2024-08-12 | End: 2024-08-12

## 2024-08-12 RX ORDER — IPRATROPIUM BROMIDE AND ALBUTEROL SULFATE 2.5; .5 MG/3ML; MG/3ML
3 SOLUTION RESPIRATORY (INHALATION) EVERY 2 HOUR PRN
Status: DISCONTINUED | OUTPATIENT
Start: 2024-08-12 | End: 2024-08-16 | Stop reason: HOSPADM

## 2024-08-12 RX ORDER — DEXTROSE 50 % IN WATER (D50W) INTRAVENOUS SYRINGE
12.5
Status: DISCONTINUED | OUTPATIENT
Start: 2024-08-12 | End: 2024-08-16 | Stop reason: HOSPADM

## 2024-08-12 RX ORDER — POTASSIUM CHLORIDE 750 MG/1
10 TABLET, FILM COATED, EXTENDED RELEASE ORAL
Status: DISCONTINUED | OUTPATIENT
Start: 2024-08-12 | End: 2024-08-16 | Stop reason: HOSPADM

## 2024-08-12 RX ORDER — ACETAMINOPHEN 325 MG/1
975 TABLET ORAL ONCE
Status: COMPLETED | OUTPATIENT
Start: 2024-08-12 | End: 2024-08-12

## 2024-08-12 RX ORDER — BUSPIRONE HYDROCHLORIDE 10 MG/1
10 TABLET ORAL 2 TIMES DAILY
Status: DISCONTINUED | OUTPATIENT
Start: 2024-08-12 | End: 2024-08-16 | Stop reason: HOSPADM

## 2024-08-12 RX ORDER — INSULIN LISPRO 100 [IU]/ML
0-10 INJECTION, SOLUTION INTRAVENOUS; SUBCUTANEOUS
Status: DISCONTINUED | OUTPATIENT
Start: 2024-08-12 | End: 2024-08-12

## 2024-08-12 RX ORDER — CHOLECALCIFEROL (VITAMIN D3) 25 MCG
1000 TABLET ORAL 2 TIMES DAILY
Status: DISCONTINUED | OUTPATIENT
Start: 2024-08-12 | End: 2024-08-16 | Stop reason: HOSPADM

## 2024-08-12 RX ORDER — ONDANSETRON HYDROCHLORIDE 2 MG/ML
4 INJECTION, SOLUTION INTRAVENOUS EVERY 8 HOURS PRN
Status: DISCONTINUED | OUTPATIENT
Start: 2024-08-12 | End: 2024-08-16 | Stop reason: HOSPADM

## 2024-08-12 RX ORDER — HYDROCHLOROTHIAZIDE 25 MG/1
50 TABLET ORAL DAILY
Status: DISCONTINUED | OUTPATIENT
Start: 2024-08-12 | End: 2024-08-16 | Stop reason: HOSPADM

## 2024-08-12 RX ORDER — AMLODIPINE BESYLATE 5 MG/1
5 TABLET ORAL DAILY
Status: DISCONTINUED | OUTPATIENT
Start: 2024-08-13 | End: 2024-08-16 | Stop reason: HOSPADM

## 2024-08-12 RX ORDER — ENOXAPARIN SODIUM 100 MG/ML
60 INJECTION SUBCUTANEOUS EVERY 12 HOURS SCHEDULED
Status: DISCONTINUED | OUTPATIENT
Start: 2024-08-12 | End: 2024-08-13

## 2024-08-12 RX ORDER — LISINOPRIL 40 MG/1
40 TABLET ORAL NIGHTLY
Status: DISCONTINUED | OUTPATIENT
Start: 2024-08-12 | End: 2024-08-16 | Stop reason: HOSPADM

## 2024-08-12 RX ORDER — FLUTICASONE PROPIONATE 50 MCG
SPRAY, SUSPENSION (ML) NASAL
Qty: 48 G | Refills: 1 | Status: SHIPPED | OUTPATIENT
Start: 2024-08-12 | End: 2025-08-12

## 2024-08-12 RX ORDER — INSULIN GLARGINE 100 [IU]/ML
40 INJECTION, SOLUTION SUBCUTANEOUS NIGHTLY
Status: DISCONTINUED | OUTPATIENT
Start: 2024-08-12 | End: 2024-08-12

## 2024-08-12 RX ORDER — L. ACIDOPHILUS/L.BULGARICUS 1MM CELL
1 TABLET ORAL 2 TIMES DAILY
Status: DISCONTINUED | OUTPATIENT
Start: 2024-08-12 | End: 2024-08-16 | Stop reason: HOSPADM

## 2024-08-12 RX ORDER — INSULIN LISPRO 100 [IU]/ML
0-15 INJECTION, SOLUTION INTRAVENOUS; SUBCUTANEOUS EVERY 4 HOURS
Status: DISCONTINUED | OUTPATIENT
Start: 2024-08-12 | End: 2024-08-13

## 2024-08-12 RX ORDER — MAGNESIUM SULFATE HEPTAHYDRATE 40 MG/ML
2 INJECTION, SOLUTION INTRAVENOUS ONCE
Status: COMPLETED | OUTPATIENT
Start: 2024-08-12 | End: 2024-08-12

## 2024-08-12 RX ORDER — INSULIN LISPRO 100 [IU]/ML
15 INJECTION, SOLUTION INTRAVENOUS; SUBCUTANEOUS ONCE
Status: COMPLETED | OUTPATIENT
Start: 2024-08-12 | End: 2024-08-12

## 2024-08-12 RX ORDER — CEFTRIAXONE 1 G/50ML
1 INJECTION, SOLUTION INTRAVENOUS EVERY 24 HOURS
Status: DISCONTINUED | OUTPATIENT
Start: 2024-08-12 | End: 2024-08-13

## 2024-08-12 ASSESSMENT — ENCOUNTER SYMPTOMS
CARDIOVASCULAR NEGATIVE: 1
COUGH: 1
FEVER: 1
DIAPHORESIS: 1
ACTIVITY CHANGE: 1
APPETITE CHANGE: 0
PSYCHIATRIC NEGATIVE: 1
EYES NEGATIVE: 1
GASTROINTESTINAL NEGATIVE: 1
FATIGUE: 1
POLYDIPSIA: 1
MUSCULOSKELETAL NEGATIVE: 1
WEAKNESS: 1
CHILLS: 1

## 2024-08-12 ASSESSMENT — LIFESTYLE VARIABLES
TOTAL SCORE: 0
HAVE YOU EVER FELT YOU SHOULD CUT DOWN ON YOUR DRINKING: NO
HAVE PEOPLE ANNOYED YOU BY CRITICIZING YOUR DRINKING: NO
EVER FELT BAD OR GUILTY ABOUT YOUR DRINKING: NO
EVER HAD A DRINK FIRST THING IN THE MORNING TO STEADY YOUR NERVES TO GET RID OF A HANGOVER: NO

## 2024-08-12 ASSESSMENT — COLUMBIA-SUICIDE SEVERITY RATING SCALE - C-SSRS
1. IN THE PAST MONTH, HAVE YOU WISHED YOU WERE DEAD OR WISHED YOU COULD GO TO SLEEP AND NOT WAKE UP?: NO
6. HAVE YOU EVER DONE ANYTHING, STARTED TO DO ANYTHING, OR PREPARED TO DO ANYTHING TO END YOUR LIFE?: NO
2. HAVE YOU ACTUALLY HAD ANY THOUGHTS OF KILLING YOURSELF?: NO

## 2024-08-12 ASSESSMENT — PAIN SCALES - GENERAL: PAINLEVEL_OUTOF10: 0 - NO PAIN

## 2024-08-12 ASSESSMENT — PAIN - FUNCTIONAL ASSESSMENT: PAIN_FUNCTIONAL_ASSESSMENT: 0-10

## 2024-08-12 NOTE — PROGRESS NOTES
Sepsis Alert @ 1520    -Chest X-ray shows pneumonia; WBC = 17.4, T = 39.1  -HR = 133, RR = 23, Lactate = 2.4    -Zosyn 3.375 gm IV @ 1609  -1 liter of NS IV @ 1449       (30 ml/kg is not necessary)    -Will begin azithromycin + ceftriaxone upon admission    Jessica Bernstein, PharmD

## 2024-08-12 NOTE — H&P
"History Of Present Illness  Wilber Pennington \"Wong\" is a 54 y.o. male presenting with fatigue, hyperglycemia and equilibrium issues for several days. States he has a cough productive for clear sputum. Has fevers, chills and red scrotum. Denies chest pain, shortness of breath, abdominal pain, nausea, diarrhea. States his glucose has been elevated. While reviewing medications, states he had insurance issues with Trulicity, so stopped taking in February. States he's mostly been in bed for 2 weeks.      Past Medical History  Past Medical History:   Diagnosis Date    Anxiety     Diabetes mellitus (Multi)     Hypertension     KARINA (obstructive sleep apnea)        Surgical History  Past Surgical History:   Procedure Laterality Date    EYE SURGERY      VASECTOMY          Social History  He reports that he has never smoked. He has never used smokeless tobacco. He reports current alcohol use. He reports that he does not use drugs.    Family History  Family History   Problem Relation Name Age of Onset    Heart failure Mother          Congested    Other (cerebrovascular disease) Mother      Hypertension Mother      Heart failure Father          Congested    Coronary artery disease Father      Hypertension Father      Multiple sclerosis Sister      Other (Gluten sensitive enteropathy) Sister      Hypertension Brother      Other (Gluten sensitive enteropathy) Brother      Other (ADHD) Brother      Celiac disease Brother      Hypertension Brother          Allergies  Patient has no known allergies.    Review of Systems   Constitutional:  Positive for activity change, chills, diaphoresis, fatigue and fever. Negative for appetite change.   HENT: Negative.     Eyes: Negative.    Respiratory:  Positive for cough.    Cardiovascular: Negative.    Gastrointestinal: Negative.    Endocrine: Positive for polydipsia and polyuria.   Genitourinary: Negative.    Musculoskeletal: Negative.    Skin:  Positive for rash.   Neurological:  Positive for " "weakness.   Psychiatric/Behavioral: Negative.          Physical Exam  Constitutional:       Appearance: He is obese. He is ill-appearing.   HENT:      Head: Normocephalic and atraumatic.      Mouth/Throat:      Mouth: Mucous membranes are moist.      Pharynx: Oropharynx is clear.   Eyes:      Extraocular Movements: Extraocular movements intact.      Conjunctiva/sclera: Conjunctivae normal.      Pupils: Pupils are equal, round, and reactive to light.   Cardiovascular:      Rate and Rhythm: Normal rate and regular rhythm.      Pulses: Normal pulses.      Heart sounds: Normal heart sounds.   Pulmonary:      Effort: Pulmonary effort is normal.      Breath sounds: Normal breath sounds.   Abdominal:      General: Bowel sounds are normal.      Palpations: Abdomen is soft.      Tenderness: There is no abdominal tenderness.   Musculoskeletal:         General: Normal range of motion.      Cervical back: Normal range of motion and neck supple.   Skin:     Capillary Refill: Capillary refill takes less than 2 seconds.      Comments: Scrotum red, irritated.    Neurological:      General: No focal deficit present.      Mental Status: He is alert and oriented to person, place, and time.   Psychiatric:         Mood and Affect: Mood normal.         Behavior: Behavior normal.          Last Recorded Vitals  Blood pressure 144/72, pulse (!) 105, temperature (!) 39.3 °C (102.8 °F), resp. rate 20, height 1.778 m (5' 10\"), weight (!) 177 kg (390 lb), SpO2 100%.    Relevant Results  Results for orders placed or performed during the hospital encounter of 08/12/24 (from the past 24 hour(s))   POCT GLUCOSE   Result Value Ref Range    POCT Glucose 521 (H) 74 - 99 mg/dL   CBC and Auto Differential   Result Value Ref Range    WBC 17.4 (H) 4.4 - 11.3 x10*3/uL    nRBC 0.0 0.0 - 0.0 /100 WBCs    RBC 4.87 4.50 - 5.90 x10*6/uL    Hemoglobin 13.3 (L) 13.5 - 17.5 g/dL    Hematocrit 39.7 (L) 41.0 - 52.0 %    MCV 82 80 - 100 fL    MCH 27.3 26.0 - 34.0 pg "    MCHC 33.5 32.0 - 36.0 g/dL    RDW 14.0 11.5 - 14.5 %    Platelets 165 150 - 450 x10*3/uL    Neutrophils % 90.3 40.0 - 80.0 %    Immature Granulocytes %, Automated 0.5 0.0 - 0.9 %    Lymphocytes % 2.6 13.0 - 44.0 %    Monocytes % 6.3 2.0 - 10.0 %    Eosinophils % 0.0 0.0 - 6.0 %    Basophils % 0.3 0.0 - 2.0 %    Neutrophils Absolute 15.71 (H) 1.20 - 7.70 x10*3/uL    Immature Granulocytes Absolute, Automated 0.09 0.00 - 0.70 x10*3/uL    Lymphocytes Absolute 0.46 (L) 1.20 - 4.80 x10*3/uL    Monocytes Absolute 1.10 (H) 0.10 - 1.00 x10*3/uL    Eosinophils Absolute 0.00 0.00 - 0.70 x10*3/uL    Basophils Absolute 0.05 0.00 - 0.10 x10*3/uL   Comprehensive metabolic panel   Result Value Ref Range    Glucose 546 (HH) 65 - 99 mg/dL    Sodium 123 (L) 133 - 145 mmol/L    Potassium 4.0 3.4 - 5.1 mmol/L    Chloride 86 (L) 97 - 107 mmol/L    Bicarbonate 20 (L) 24 - 31 mmol/L    Urea Nitrogen 29 (H) 8 - 25 mg/dL    Creatinine 1.50 0.40 - 1.60 mg/dL    eGFR 54 (L) >60 mL/min/1.73m*2    Calcium 9.1 8.5 - 10.4 mg/dL    Albumin 3.4 (L) 3.5 - 5.0 g/dL    Alkaline Phosphatase 96 35 - 125 U/L    Total Protein 8.1 (H) 5.9 - 7.9 g/dL    AST 18 5 - 40 U/L    Bilirubin, Total 1.0 0.1 - 1.2 mg/dL    ALT 19 5 - 40 U/L    Anion Gap 17 <=19 mmol/L   Lipase   Result Value Ref Range    Lipase 59 16 - 63 U/L   NT Pro-BNP   Result Value Ref Range    PROBNP 755 (H) 0 - 177 pg/mL   BLOOD GAS VENOUS   Result Value Ref Range    POCT pH, Venous 7.49 (H) 7.33 - 7.43 pH    POCT pCO2, Venous 30 (L) 41 - 51 mm Hg    POCT pO2, Venous 56 (H) 35 - 45 mm Hg    POCT SO2, Venous 90 (H) 45 - 75 %    POCT Oxy Hemoglobin, Venous 87.6 (H) 45.0 - 75.0 %    POCT Base Excess, Venous 0.4 -2.0 - 3.0 mmol/L    POCT HCO3 Calculated, Venous 22.9 22.0 - 26.0 mmol/L    Patient Temperature 37.0 degrees Celsius    FiO2 21 %   Beta Hydroxybutyrate   Result Value Ref Range    Beta-Hydroxybutyrate 1.50 (H) 0.10 - 0.30 mmol/L   Magnesium   Result Value Ref Range    Magnesium 1.30  (L) 1.60 - 3.10 mg/dL   Blood Gas Lactic Acid, Venous   Result Value Ref Range    POCT Lactate, Venous 2.4 (H) 0.4 - 2.0 mmol/L   Serial Troponin, Initial ([facility])   Result Value Ref Range    Troponin T, High Sensitivity 27 (HH) <=14 ng/L   Sars-CoV-2 PCR   Result Value Ref Range    Coronavirus 2019, PCR Not Detected Not Detected   Influenza A, and B PCR   Result Value Ref Range    Flu A Result Not Detected Not Detected    Flu B Result Not Detected Not Detected   Urinalysis with Reflex Culture and Microscopic   Result Value Ref Range    Color, Urine Yellow Light-Yellow, Yellow, Dark-Yellow    Appearance, Urine Ex.Turbid (N) Clear    Specific Gravity, Urine 1.023 1.005 - 1.035    pH, Urine 6.0 5.0, 5.5, 6.0, 6.5, 7.0, 7.5, 8.0    Protein, Urine 300 (3+) (A) NEGATIVE, 10 (TRACE), 20 (TRACE) mg/dL    Glucose, Urine OVER (4+) (A) Normal mg/dL    Blood, Urine OVER (3+) (A) NEGATIVE    Ketones, Urine TRACE (A) NEGATIVE mg/dL    Bilirubin, Urine NEGATIVE NEGATIVE    Urobilinogen, Urine Normal Normal mg/dL    Nitrite, Urine NEGATIVE NEGATIVE    Leukocyte Esterase, Urine 500 Jesika/µL (A) NEGATIVE   Microscopic Only, Urine   Result Value Ref Range    WBC, Urine >50 (A) 1-5, NONE /HPF    WBC Clumps, Urine MANY Reference range not established. /HPF    RBC, Urine >20 (A) NONE, 1-2, 3-5 /HPF    Squamous Epithelial Cells, Urine 1-9 (SPARSE) Reference range not established. /HPF    Bacteria, Urine 3+ (A) NONE SEEN /HPF    Mucus, Urine 1+ Reference range not established. /LPF   POCT GLUCOSE   Result Value Ref Range    POCT Glucose 526 (H) 74 - 99 mg/dL     XR chest 1 view    Result Date: 8/12/2024  Interpreted By:  Soledad Post, STUDY: XR CHEST 1 VIEW; ;  8/12/2024 3:10 pm   INDICATION: Signs/Symptoms:fever, sob.   COMPARISON: None.   ACCESSION NUMBER(S): [id number]   ORDERING CLINICIAN: LINO MONTE   FINDINGS: Cardiac silhouette is mildly enlarged. There is prominence of interstitial markings in bilateral lungs with perihilar  vascular prominence. There are ill-defined reticular airspace opacities in bilateral mid to lower lung zones. No large pleural effusions or pneumothorax within limits of portable technique. No acute osseous findings.       1. Mild cardiomegaly with suggestion of mild interstitial edema. Recommend correlation with fluid status and cardiac function. 2. Suggestion of ill-defined reticular opacities in bilateral mid to lower lung zones. While these could be related to interstitial edema, pneumonia secondary to atypical infectious etiology can be considered in the differential. Recommend clinical and laboratory correlation.       MACRO: None   Signed by: Soledad Post 8/12/2024 3:39 PM Dictation workstation:   [id number]          Assessment/Plan   Principal Problem:    Pneumonia   X-ray with bilateral opacities concerning for pneumonia   Elevated WBC, fever    IV antibiotics    O2 as needed    Check AM labs    Duonebs PRN    Respiratory therapy consult   Active Problems:    Anxiety   Continue Buspar     Hypertension   Continue lisinopril, hydrochlorothiazide, amlodipine and Toprol     KARINA (obstructive sleep apnea)   Respiratory therapy consulted    BiPAP at night     Type 2 diabetes mellitus (Multi)   Hold home metformin    Glucose every 4 hours with SSI coverage    Lantus 40 units nightly (takes Tresiba 70 units daily at home)    Hypoglycemia coverage     Hyperglycemia due to diabetes mellitus (Multi)   See above regarding diabetes    NPO until glucose improved     Yeast infection of the skin   Topical Nystatin powder    Consider oral antifungal    Probiotic while on IV antibiotics     DVT prophylaxis    Lovenox           Aleena Novak APRN-CNP

## 2024-08-12 NOTE — ED PROVIDER NOTES
HPI   Chief Complaint   Patient presents with    Flu Symptoms     578 BG at home. Hasn't been feeling good. Low-grade fever. Unsteady. Has been urinating himself. Pt admits that he does not check his sugar as often as he should.    Hyperglycemia       54-year-old male presented emergency department with a chief complaint of productive cough, fever.  Also complains of elevated blood sugar.  He has a known diabetic.  History of hypertension.  He self discontinued Trulicity several months ago.  He is concerned he is developing lung infection from his CPAP.  He has not taken fever reducer today.  He denies chest discomfort.  He complains of shortness of breath his pulse ox is around 91 to 92% on room air.  Does not typically have supplemental oxygen requirement.  He complains of nausea vomiting.  He denies abdominal pain or diarrhea.  He complains of a white crusty substance around his penis.  He is severely obese.  No other complaint.              Patient History   No past medical history on file.  No past surgical history on file.  Family History   Problem Relation Name Age of Onset    Heart failure Mother          Congested    Other (cerebrovascular disease) Mother      Hypertension Mother      Heart failure Father          Congested    Coronary artery disease Father      Hypertension Father      Multiple sclerosis Sister      Other (Gluten sensitive enteropathy) Sister      Hypertension Brother      Other (Gluten sensitive enteropathy) Brother      Other (ADHD) Brother      Celiac disease Brother      Hypertension Brother       Social History     Tobacco Use    Smoking status: Not on file    Smokeless tobacco: Not on file   Substance Use Topics    Alcohol use: Not on file    Drug use: Not on file       Physical Exam   ED Triage Vitals [08/12/24 1414]   Temperature Heart Rate Respirations BP   (!) 39.1 °C (102.4 °F) (!) 133 (!) 23 171/62      Pulse Ox Temp Source Heart Rate Source Patient Position   (!) 93 % Temporal  -- Sitting      BP Location FiO2 (%)     Left arm --       Physical Exam  Vitals and nursing note reviewed.   Constitutional:       Appearance: Normal appearance. He is obese.   HENT:      Head: Normocephalic.      Nose: Nose normal.      Mouth/Throat:      Mouth: Mucous membranes are moist.   Cardiovascular:      Rate and Rhythm: Regular rhythm. Tachycardia present.   Pulmonary:      Effort: Pulmonary effort is normal.   Abdominal:      General: Abdomen is flat.   Genitourinary:     Comments: Crusting white discharge around head of penis  Musculoskeletal:         General: Normal range of motion.      Cervical back: Normal range of motion.   Skin:     General: Skin is warm.   Neurological:      General: No focal deficit present.      Mental Status: He is alert and oriented to person, place, and time.           ED Course & MDM   ED Course as of 08/12/24 1617   Mon Aug 12, 2024   1533 EKG personally interpreted by me performed at 1428  Sinus tachycardia with left anterior fascicular block ventricular rate 134 left axis deviation no acute ischemic changes [EF]      ED Course User Index  [EF] Gail Lucia, DO         Diagnoses as of 08/12/24 1617   Pneumonia due to infectious organism, unspecified laterality, unspecified part of lung   Hyperglycemia   Sepsis, due to unspecified organism, unspecified whether acute organ dysfunction present (Multi)                 No data recorded     Sivan Coma Scale Score: 15 (08/12/24 1412 : bAilio Treviño, EMT)                           Medical Decision Making  I have seen and evaluated this patient.  Physician available for consultation.  Vital signs have been reviewed.  All laboratory and diagnostic imaging is reviewed by myself and interpreted by myself unless otherwise stated.  Additionally imaging is interpreted by radiologist.    CBC demonstrates 17,000 leukocytosis without significant anemia metabolic panel without vannesa renal impairment.  Magnesium slightly low.  This is  replaced.  Troponin equivocally elevated, proBNP marginally elevated.  Chest x-ray concerning for pneumonia especially in the setting of leukocytosis fever and productive cough.  Lactic acid was obtained is 2.4.  Blood cultures were obtained.  COVID and flu testing were obtained.  Still pending urinalysis.  Patient received 2 L normal saline.  Does not require 30 cc/kg fluid bolus given lack of endorgan damage with lactic less than 4.  He is given a dose of Zosyn for broad-spectrum antibiotic.  Additionally treated with 10 units of insulin with improvement of blood sugar.  There is no evidence of diabetic ketoacidosis or hyperglycemic hyperosmolar state.  Overall impression is sepsis pneumonia.  Patient is reexamined and perfusing all 4 extremities well.  He is admitted for further treatment and management.    I have seen and evaluated this patient and independently provided 31 minutes of nonconcurrent critical care time. This does not include separately billable procedures. Patient with high potential for deterioration, required frequent monitoring and assessment.    Labs Reviewed  CBC WITH AUTO DIFFERENTIAL - Abnormal     WBC                           17.4 (*)               nRBC                          0.0                    RBC                           4.87                   Hemoglobin                    13.3 (*)               Hematocrit                    39.7 (*)               MCV                           82                     MCH                           27.3                   MCHC                          33.5                   RDW                           14.0                   Platelets                     165                    Neutrophils %                 90.3                   Immature Granulocytes %, Automated   0.5                    Lymphocytes %                 2.6                    Monocytes %                   6.3                    Eosinophils %                 0.0                     Basophils %                   0.3                    Neutrophils Absolute          15.71 (*)               Immature Granulocytes Absolute, Au*   0.09                   Lymphocytes Absolute          0.46 (*)               Monocytes Absolute            1.10 (*)               Eosinophils Absolute          0.00                   Basophils Absolute            0.05                COMPREHENSIVE METABOLIC PANEL - Abnormal     Glucose                       546 (*)                Sodium                        123 (*)                Potassium                     4.0                    Chloride                      86 (*)                 Bicarbonate                   20 (*)                 Urea Nitrogen                 29 (*)                 Creatinine                    1.50                   eGFR                          54 (*)                 Calcium                       9.1                    Albumin                       3.4 (*)                Alkaline Phosphatase          96                     Total Protein                 8.1 (*)                AST                           18                     Bilirubin, Total              1.0                    ALT                           19                     Anion Gap                     17                  N-TERMINAL PROBNP - Abnormal     PROBNP                        755 (*)                  Narrative: Reference ranges are based on clinical submission data. These ranges represent the 95th percentile of normal cut-off points. As NT Pro- BNP values approach 1000 pg/ml, clinical symptoms are more likely associated with CHF.  BLOOD GAS VENOUS - Abnormal     POCT pH, Venous               7.49 (*)               POCT pCO2, Venous             30 (*)                 POCT pO2, Venous              56 (*)                 POCT SO2, Venous              90 (*)                 POCT Oxy Hemoglobin, Venous   87.6 (*)               POCT Base Excess, Venous      0.4                    POCT HCO3  Calculated, Venous   22.9                   Patient Temperature           37.0                   FiO2                          21                  BETA HYDROXYBUTYRATE - Abnormal     Beta-Hydroxybutyrate          1.50 (*)                 Narrative: Values exceeding 0.27 mmol/L indicate ketosis.  MAGNESIUM - Abnormal     Magnesium                     1.30 (*)            BLOOD GAS LACTIC ACID, VENOUS - Abnormal     POCT Lactate, Venous          2.4 (*)             SERIAL TROPONIN, INITIAL ([facility]) - Abnormal     Troponin T, High Sensitivity   27 (*)              POCT GLUCOSE - Abnormal     POCT Glucose                  521 (*)             LIPASE - Normal     Lipase                        59                  SARS-COV-2 PCR - Normal     Coronavirus 2019, PCR                                  Narrative: This assay has received FDA Emergency Use Authorization (EUA) and is only authorized for the duration of time that circumstances exist to justify the authorization of the emergency use of in vitro diagnostic tests for the detection of SARS-CoV-2 virus and/or diagnosis of COVID-19 infection under section 564(b)(1) of the Act, 21 U.S.C. 360bbb-3(b)(1). This assay is an in vitro diagnostic nucleic acid amplification test for the qualitative detection of SARS-CoV-2 from nasopharyngeal specimens and has been validated for use at [facility]. Negative results do not preclude COVID-19 infections and should not be used as the sole basis for diagnosis, treatment, or other management decisions.                  INFLUENZA A AND B PCR - Normal     Flu A Result                                         Flu B Result                                           Narrative: This assay is an in vitro diagnostic multiplex nucleic acid amplification test for the detection and discrimination of Influenza A & B from nasopharyngeal specimens, and has been validated for use at [facility]. Negative results  do not preclude Influenza A/B infections, and should not be used as the sole basis for diagnosis, treatment, or other management decisions. If Influenza A/B and RSV PCR results are negative, testing for Parainfluenza virus, Adenovirus and Metapneumovirus is routinely performed for [facility] pediatric oncology and intensive care inpatients, and is available on other patients by placing an add-on request.  BLOOD CULTURE  BLOOD CULTURE  URINALYSIS WITH REFLEX CULTURE AND MICROSCOPIC       Narrative: The following orders were created for panel order Urinalysis with Reflex Culture and Microscopic.                Procedure                               Abnormality         Status                                   ---------                               -----------         ------                                   Urinalysis with Reflex C...[[id number]]                                                               Extra Urine Gray Tube[[id number]]                                                                                     Please view results for these tests on the individual orders.  TROPONIN T SERIES, HIGH SENSITIVITY (0, 2 HR, 6 HR)       Narrative: The following orders were created for panel order Troponin T Series, High Sensitivity (0, 2HR, 6HR).                Procedure                               Abnormality         Status                                   ---------                               -----------         ------                                   Serial Troponin, Initial...[[id number]]  Abnormal            Final result                             Serial Troponin, 2 Hour ...[[id number]]                                                                               Please view results for these tests on the individual orders.  URINALYSIS WITH REFLEX CULTURE AND MICROSCOPIC  EXTRA URINE GRAY TUBE  SERIAL TROPONIN,  2 HOUR ([facility])  BLOOD GAS LACTIC ACID, VENOUS  POCT GLUCOSE METER  XR chest 1 view   Final Result     1. Mild cardiomegaly with suggestion of mild interstitial edema.    Recommend correlation with fluid status and cardiac function.    2. Suggestion of ill-defined reticular opacities in bilateral mid to    lower lung zones. While these could be related to interstitial edema,    pneumonia secondary to atypical infectious etiology can be considered    in the differential. Recommend clinical and laboratory correlation.                      MACRO:    None          Signed by: Soledad Post 8/12/2024 3:39 PM    Dictation workstation:   [id number]     Medications  piperacillin-tazobactam (Zosyn) 3.375 g in dextrose (iso) IV 50 mL (3.375 g intravenous New Bag 8/12/24 1609)  magnesium sulfate 2 g in sterile water for injection 50 mL (2 g intravenous New Bag 8/12/24 1615)  sodium chloride 0.9 % bolus 1,000 mL (has no administration in time range)  sodium chloride 0.9 % bolus 1,000 mL (1,000 mL intravenous New Bag 8/12/24 1449)  acetaminophen (Tylenol) tablet 975 mg (975 mg oral Given 8/12/24 1447)  insulin regular (HumuLIN R,NovoLIN R) injection 10 Units (10 Units intravenous Given 8/12/24 1613)  fluconazole (Diflucan) tablet 150 mg (150 mg oral Given 8/12/24 1608)  ibuprofen tablet 600 mg (600 mg oral Given 8/12/24 1608)  New Prescriptions  No medications on file            Procedure  Procedures     Wilber Singleton PA-C  08/12/24 3252

## 2024-08-13 ENCOUNTER — APPOINTMENT (OUTPATIENT)
Dept: RADIOLOGY | Facility: HOSPITAL | Age: 54
End: 2024-08-13
Payer: COMMERCIAL

## 2024-08-13 LAB
ANION GAP SERPL CALC-SCNC: 14 MMOL/L
BUN SERPL-MCNC: 36 MG/DL (ref 8–25)
CALCIUM SERPL-MCNC: 8.2 MG/DL (ref 8.5–10.4)
CHLORIDE SERPL-SCNC: 89 MMOL/L (ref 97–107)
CK SERPL-CCNC: 189 U/L (ref 24–195)
CO2 SERPL-SCNC: 21 MMOL/L (ref 24–31)
CREAT SERPL-MCNC: 2 MG/DL (ref 0.4–1.6)
CREAT UR-MCNC: 212.5 MG/DL (ref 20–370)
EGFRCR SERPLBLD CKD-EPI 2021: 38 ML/MIN/1.73M*2
ERYTHROCYTE [DISTWIDTH] IN BLOOD BY AUTOMATED COUNT: 14.1 % (ref 11.5–14.5)
GLUCOSE BLD MANUAL STRIP-MCNC: 349 MG/DL (ref 74–99)
GLUCOSE BLD MANUAL STRIP-MCNC: 360 MG/DL (ref 74–99)
GLUCOSE BLD MANUAL STRIP-MCNC: 377 MG/DL (ref 74–99)
GLUCOSE BLD MANUAL STRIP-MCNC: 379 MG/DL (ref 74–99)
GLUCOSE BLD MANUAL STRIP-MCNC: 383 MG/DL (ref 74–99)
GLUCOSE BLD MANUAL STRIP-MCNC: 437 MG/DL (ref 74–99)
GLUCOSE SERPL-MCNC: 427 MG/DL (ref 65–99)
HCT VFR BLD AUTO: 34.7 % (ref 41–52)
HGB BLD-MCNC: 11.7 G/DL (ref 13.5–17.5)
LACTATE BLDV-SCNC: 1.7 MMOL/L (ref 0.4–2)
MAGNESIUM SERPL-MCNC: 1.5 MG/DL (ref 1.6–3.1)
MAGNESIUM UR-MCNC: 1.17 MG/DL
MAGNESIUM/CREAT UR: 5.5 MG/G CREAT
MCH RBC QN AUTO: 27.1 PG (ref 26–34)
MCHC RBC AUTO-ENTMCNC: 33.7 G/DL (ref 32–36)
MCV RBC AUTO: 80 FL (ref 80–100)
NRBC BLD-RTO: 0 /100 WBCS (ref 0–0)
PLATELET # BLD AUTO: 147 X10*3/UL (ref 150–450)
POTASSIUM SERPL-SCNC: 3.6 MMOL/L (ref 3.4–5.1)
RBC # BLD AUTO: 4.32 X10*6/UL (ref 4.5–5.9)
SODIUM SERPL-SCNC: 124 MMOL/L (ref 133–145)
TROPONIN T SERPL-MCNC: 27 NG/L
TROPONIN T SERPL-MCNC: 28 NG/L
WBC # BLD AUTO: 11.9 X10*3/UL (ref 4.4–11.3)

## 2024-08-13 PROCEDURE — 2060000001 HC INTERMEDIATE ICU ROOM DAILY

## 2024-08-13 PROCEDURE — 2500000004 HC RX 250 GENERAL PHARMACY W/ HCPCS (ALT 636 FOR OP/ED): Performed by: INTERNAL MEDICINE

## 2024-08-13 PROCEDURE — 5A09357 ASSISTANCE WITH RESPIRATORY VENTILATION, LESS THAN 24 CONSECUTIVE HOURS, CONTINUOUS POSITIVE AIRWAY PRESSURE: ICD-10-PCS | Performed by: FAMILY MEDICINE

## 2024-08-13 PROCEDURE — 84484 ASSAY OF TROPONIN QUANT: CPT | Performed by: PHYSICIAN ASSISTANT

## 2024-08-13 PROCEDURE — 2500000004 HC RX 250 GENERAL PHARMACY W/ HCPCS (ALT 636 FOR OP/ED): Performed by: FAMILY MEDICINE

## 2024-08-13 PROCEDURE — 74176 CT ABD & PELVIS W/O CONTRAST: CPT

## 2024-08-13 PROCEDURE — 97530 THERAPEUTIC ACTIVITIES: CPT | Mod: GO

## 2024-08-13 PROCEDURE — 2500000001 HC RX 250 WO HCPCS SELF ADMINISTERED DRUGS (ALT 637 FOR MEDICARE OP): Performed by: NURSE PRACTITIONER

## 2024-08-13 PROCEDURE — 82374 ASSAY BLOOD CARBON DIOXIDE: CPT | Performed by: NURSE PRACTITIONER

## 2024-08-13 PROCEDURE — 2500000002 HC RX 250 W HCPCS SELF ADMINISTERED DRUGS (ALT 637 FOR MEDICARE OP, ALT 636 FOR OP/ED): Performed by: NURSE PRACTITIONER

## 2024-08-13 PROCEDURE — 36415 COLL VENOUS BLD VENIPUNCTURE: CPT | Performed by: NURSE PRACTITIONER

## 2024-08-13 PROCEDURE — 97165 OT EVAL LOW COMPLEX 30 MIN: CPT | Mod: GO

## 2024-08-13 PROCEDURE — 85027 COMPLETE CBC AUTOMATED: CPT | Performed by: NURSE PRACTITIONER

## 2024-08-13 PROCEDURE — 83605 ASSAY OF LACTIC ACID: CPT | Performed by: NURSE PRACTITIONER

## 2024-08-13 PROCEDURE — 94660 CPAP INITIATION&MGMT: CPT

## 2024-08-13 PROCEDURE — 82947 ASSAY GLUCOSE BLOOD QUANT: CPT

## 2024-08-13 PROCEDURE — 2500000004 HC RX 250 GENERAL PHARMACY W/ HCPCS (ALT 636 FOR OP/ED): Performed by: NURSE PRACTITIONER

## 2024-08-13 PROCEDURE — 82550 ASSAY OF CK (CPK): CPT | Performed by: INTERNAL MEDICINE

## 2024-08-13 PROCEDURE — 83735 ASSAY OF MAGNESIUM: CPT | Performed by: INTERNAL MEDICINE

## 2024-08-13 PROCEDURE — 74176 CT ABD & PELVIS W/O CONTRAST: CPT | Performed by: STUDENT IN AN ORGANIZED HEALTH CARE EDUCATION/TRAINING PROGRAM

## 2024-08-13 PROCEDURE — 83735 ASSAY OF MAGNESIUM: CPT | Mod: WESLAB | Performed by: INTERNAL MEDICINE

## 2024-08-13 PROCEDURE — 2500000002 HC RX 250 W HCPCS SELF ADMINISTERED DRUGS (ALT 637 FOR MEDICARE OP, ALT 636 FOR OP/ED): Performed by: INTERNAL MEDICINE

## 2024-08-13 PROCEDURE — 97161 PT EVAL LOW COMPLEX 20 MIN: CPT | Mod: GP

## 2024-08-13 RX ORDER — MAGNESIUM SULFATE 1 G/100ML
1 INJECTION INTRAVENOUS ONCE
Status: COMPLETED | OUTPATIENT
Start: 2024-08-13 | End: 2024-08-13

## 2024-08-13 RX ORDER — INSULIN LISPRO 100 [IU]/ML
15 INJECTION, SOLUTION INTRAVENOUS; SUBCUTANEOUS ONCE
Status: COMPLETED | OUTPATIENT
Start: 2024-08-13 | End: 2024-08-13

## 2024-08-13 RX ORDER — ENOXAPARIN SODIUM 100 MG/ML
60 INJECTION SUBCUTANEOUS 2 TIMES DAILY
Status: DISCONTINUED | OUTPATIENT
Start: 2024-08-13 | End: 2024-08-14

## 2024-08-13 RX ORDER — CETIRIZINE HYDROCHLORIDE 10 MG/1
10 TABLET ORAL DAILY
COMMUNITY
End: 2024-08-22 | Stop reason: ALTCHOICE

## 2024-08-13 RX ORDER — INSULIN LISPRO 100 [IU]/ML
0-20 INJECTION, SOLUTION INTRAVENOUS; SUBCUTANEOUS
Status: DISCONTINUED | OUTPATIENT
Start: 2024-08-13 | End: 2024-08-16 | Stop reason: HOSPADM

## 2024-08-13 RX ORDER — METOPROLOL TARTRATE 50 MG/1
50 TABLET ORAL 2 TIMES DAILY
Status: DISCONTINUED | OUTPATIENT
Start: 2024-08-13 | End: 2024-08-16 | Stop reason: HOSPADM

## 2024-08-13 RX ORDER — INSULIN GLARGINE 100 [IU]/ML
50 INJECTION, SOLUTION SUBCUTANEOUS NIGHTLY
Status: DISCONTINUED | OUTPATIENT
Start: 2024-08-13 | End: 2024-08-16 | Stop reason: HOSPADM

## 2024-08-13 SDOH — SOCIAL STABILITY: SOCIAL INSECURITY: HAVE YOU HAD ANY THOUGHTS OF HARMING ANYONE ELSE?: NO

## 2024-08-13 SDOH — SOCIAL STABILITY: SOCIAL INSECURITY: ABUSE: ADULT

## 2024-08-13 SDOH — SOCIAL STABILITY: SOCIAL INSECURITY: ARE THERE ANY APPARENT SIGNS OF INJURIES/BEHAVIORS THAT COULD BE RELATED TO ABUSE/NEGLECT?: NO

## 2024-08-13 SDOH — SOCIAL STABILITY: SOCIAL INSECURITY: DOES ANYONE TRY TO KEEP YOU FROM HAVING/CONTACTING OTHER FRIENDS OR DOING THINGS OUTSIDE YOUR HOME?: NO

## 2024-08-13 SDOH — SOCIAL STABILITY: SOCIAL INSECURITY: HAS ANYONE EVER THREATENED TO HURT YOUR FAMILY OR YOUR PETS?: YES

## 2024-08-13 SDOH — SOCIAL STABILITY: SOCIAL INSECURITY: ARE YOU OR HAVE YOU BEEN THREATENED OR ABUSED PHYSICALLY, EMOTIONALLY, OR SEXUALLY BY ANYONE?: NO

## 2024-08-13 SDOH — SOCIAL STABILITY: SOCIAL INSECURITY: HAVE YOU HAD THOUGHTS OF HARMING ANYONE ELSE?: NO

## 2024-08-13 SDOH — SOCIAL STABILITY: SOCIAL INSECURITY: DO YOU FEEL ANYONE HAS EXPLOITED OR TAKEN ADVANTAGE OF YOU FINANCIALLY OR OF YOUR PERSONAL PROPERTY?: NO

## 2024-08-13 SDOH — SOCIAL STABILITY: SOCIAL INSECURITY: DO YOU FEEL UNSAFE GOING BACK TO THE PLACE WHERE YOU ARE LIVING?: NO

## 2024-08-13 SDOH — SOCIAL STABILITY: SOCIAL INSECURITY: WERE YOU ABLE TO COMPLETE ALL THE BEHAVIORAL HEALTH SCREENINGS?: YES

## 2024-08-13 ASSESSMENT — COGNITIVE AND FUNCTIONAL STATUS - GENERAL
DRESSING REGULAR LOWER BODY CLOTHING: A LOT
WALKING IN HOSPITAL ROOM: A LITTLE
MOBILITY SCORE: 16
DAILY ACTIVITIY SCORE: 18
MOVING FROM LYING ON BACK TO SITTING ON SIDE OF FLAT BED WITH BEDRAILS: A LITTLE
HELP NEEDED FOR BATHING: A LOT
DRESSING REGULAR UPPER BODY CLOTHING: A LITTLE
TURNING FROM BACK TO SIDE WHILE IN FLAT BAD: A LOT
TOILETING: A LITTLE
MOVING TO AND FROM BED TO CHAIR: A LITTLE
STANDING UP FROM CHAIR USING ARMS: A LITTLE
CLIMB 3 TO 5 STEPS WITH RAILING: A LOT

## 2024-08-13 ASSESSMENT — PATIENT HEALTH QUESTIONNAIRE - PHQ9
1. LITTLE INTEREST OR PLEASURE IN DOING THINGS: SEVERAL DAYS
SUM OF ALL RESPONSES TO PHQ9 QUESTIONS 1 & 2: 1
2. FEELING DOWN, DEPRESSED OR HOPELESS: NOT AT ALL

## 2024-08-13 ASSESSMENT — ACTIVITIES OF DAILY LIVING (ADL)
BATHING_ASSISTANCE: MODERATE
ADL_ASSISTANCE: INDEPENDENT
LACK_OF_TRANSPORTATION: NO

## 2024-08-13 ASSESSMENT — PAIN SCALES - GENERAL
PAINLEVEL_OUTOF10: 0 - NO PAIN

## 2024-08-13 ASSESSMENT — ENCOUNTER SYMPTOMS
SHORTNESS OF BREATH: 0
FEVER: 1
HALLUCINATIONS: 0
HEADACHES: 0
NAUSEA: 0
APPETITE CHANGE: 1
CHILLS: 1
DYSURIA: 0
VOMITING: 0
ARTHRALGIAS: 0
COLOR CHANGE: 1
CHEST TIGHTNESS: 0
POLYDIPSIA: 0
MYALGIAS: 0
DIZZINESS: 0
FATIGUE: 1
HEMATURIA: 0
COUGH: 1
SINUS PAIN: 0
CONFUSION: 0
DIARRHEA: 1
POLYPHAGIA: 0

## 2024-08-13 ASSESSMENT — LIFESTYLE VARIABLES
SKIP TO QUESTIONS 9-10: 1
HOW OFTEN DO YOU HAVE A DRINK CONTAINING ALCOHOL: 2-4 TIMES A MONTH
HOW OFTEN DO YOU HAVE 6 OR MORE DRINKS ON ONE OCCASION: NEVER
AUDIT-C TOTAL SCORE: 2
AUDIT-C TOTAL SCORE: 2
HOW MANY STANDARD DRINKS CONTAINING ALCOHOL DO YOU HAVE ON A TYPICAL DAY: 1 OR 2

## 2024-08-13 ASSESSMENT — PAIN DESCRIPTION - PROGRESSION: CLINICAL_PROGRESSION: NOT CHANGED

## 2024-08-13 ASSESSMENT — PAIN - FUNCTIONAL ASSESSMENT
PAIN_FUNCTIONAL_ASSESSMENT: 0-10

## 2024-08-13 NOTE — CONSULTS
"INFECTIOUS DISEASES CONSULTATION NOTE      Referred by Scot Pinto MD    Reason For Consult  Gram-negative septicemia    History Of Present Illness  Wilber Pennington \"Wong\" is a 54 y.o. male with obesity and poorly controlled diabetes mellitus.  3 days before admission he spent the day clearing downed brush from his property.  2 days before admission he awoke with profound fatigue and myalgia and a sensation of fever without rigors or diaphoresis.  He ate nothing, worked as a  that morning, and then returned home and spent the rest of the day in bed.  On the day of admission, yesterday, he continued to feel weak and off balance and his wife was concerned that he was \"not himself.\"  He presented to the emergency room and was found to have fever and leukocytosis.  He was thought to have pneumonia and was started on ceftriaxone and azithromycin.  He did have pyuria and complained of some erythema of the scrotum but did not have dysuria.  He has no history of urinary tract infection or prostate difficulty.  He is boarding in the emergency room waiting for a medical bed, and his blood cultures drawn on admission are growing gram-negative bacilli, prompting this consultation.    He is a lifetime non-smoker.  No recent travel except to [place].  Lives with his wife who has no acute symptoms.  He has a somewhat chronic cough productive of clear sputum.  He wears a CPAP machine at night and his wife is concerned that it has not been cleaned properly.     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     Review of Systems  Detailed review of systems completed.  No significant additional positives beyond what is mentioned " above    Physical Exam  Vital signs:  Visit Vitals  /56   Pulse (!) 128   Temp (!) 39.2 °C (102.6 °F)   Resp (!) 26   Appears moderately ill, not toxic.  Alert and oriented.  Morbidly obese  HEENT:  No scleral icterus or conjunctival suffusion, oral mucosa moist.  Opacity in the right cornea  Nodes:  Negative  Lungs:  Clear to auscultation  Heart:  S1, S2 normal, no pathologic murmur appreciated  Abdomen:  Soft, obese, nontender. No palpable organs or masses  Back:  No spinal or CVA tenderness  Genitalia: Buried phallus.  Erythema and some edema of the scrotum but no tenderness, suppuration, or cellulitis appreciated  Extremities:  No cords, phlebitis, cellulitis  Neurologic:  Alert.  Grossly non-focal.  No meningismus  Skin: No dermatitis or cellulitis    Relevant Results  Results from last 72 hours   Lab Units 08/13/24  0546 08/12/24  1443   WBC AUTO x10*3/uL 11.9* 17.4*   HEMOGLOBIN g/dL 11.7* 13.3*   HEMATOCRIT % 34.7* 39.7*   PLATELETS AUTO x10*3/uL 147* 165   NEUTROS PCT AUTO %  --  90.3   LYMPHS PCT AUTO %  --  2.6   MONOS PCT AUTO %  --  6.3   EOS PCT AUTO %  --  0.0     Creatinine: (Baseline 0.9) 1.5 ---> 2.0    Results from last 72 hours   Lab Units 08/12/24  1443   AST U/L 18   ALT U/L 19   ALK PHOS U/L 96   BILIRUBIN TOTAL mg/dL 1.0     Urinalysis: Heavy pyuria  Microbiology:  Blood ((8/12): GNR X2  Urine: Pending  Imaging:  CXR images personally reviewed: Some vascular congestion, no pneumonia      ASSESSMENT:  GNR septicemia  Patient is admitted with primarily constitutional symptoms and has GNR septicemia and pyuria.  I suspect that his infection is of urinary origin.  Need to exclude obstruction, retention, or upper tract disease as well as search for an occult focus in the abdomen or pelvis.  I do not see any compelling evidence for pneumonia.  There is erythema of the scrotum but there is nothing to suggest Summer's gangrene    Diabetes mellitus/obesity  Marked elevation of blood glucose.   Lucas hemoglobin 12.8% June 2023      PLANS:  -   Stop azithromycin and ceftriaxone  -   Zosyn 4.5 g IV every 6 hours (estimated creatinine clearance 67 mL/min)  -   CT abdomen and pelvis without IV contrast     THANK YOU FOR ASKING ME TO ASSIST YOU IN THE CARE OF YOUR PATIENT    Nathanael Villa MD  [facility]  Office:  [phone]

## 2024-08-13 NOTE — CONSULTS
"Consults    Reason For Consult  SEAN    History Of Present Illness  Wilber Pennington \"Gabby" is a 54 y.o. male with a past medical history of hypertension, diabetes mellitus type 2 presented to the hospital with fatigue, cough, fever, chills, also some discoloration in his scrotum reportedly has a yeast infection, also with loose stools, poor p.o. intake and found have acute kidney injury, and concern for possible pneumonia.  Found to have creatinine of 2.0 and significantly elevated blood sugars with low sodium. The sodium when corrected for the blood sugars on admission is about the same as it is now, corrected at 131.  He has a baseline creatinine of 0.9.  Reports occasional Advil use but no regular NSAID use.  Was also found to be bacteremic, infectious disease on board.  We are consulted for management of acute kidney injury.     Past Medical History  He has a past medical history of Anxiety, Diabetes mellitus (Multi), Hypertension, and KARINA (obstructive sleep apnea).    Surgical History  He has a past surgical history that includes Vasectomy and Eye surgery.     Social History  He reports that he has never smoked. He has never used smokeless tobacco. He reports current alcohol use. He reports that he does not use drugs.    Family History  Family History   Problem Relation Name Age of Onset    Heart failure Mother          Congested    Other (cerebrovascular disease) Mother      Hypertension Mother      Heart failure Father          Congested    Coronary artery disease Father      Hypertension Father      Multiple sclerosis Sister      Other (Gluten sensitive enteropathy) Sister      Hypertension Brother      Other (Gluten sensitive enteropathy) Brother      Other (ADHD) Brother      Celiac disease Brother      Hypertension Brother          Allergies  Patient has no known allergies.    Review of Systems   Constitutional:  Positive for appetite change, chills, fatigue and fever.   HENT:  Negative for congestion and " "sinus pain.    Respiratory:  Positive for cough. Negative for chest tightness and shortness of breath.    Cardiovascular:  Negative for chest pain and leg swelling.   Gastrointestinal:  Positive for diarrhea. Negative for nausea and vomiting.   Endocrine: Negative for polydipsia, polyphagia and polyuria.   Genitourinary:  Negative for dysuria and hematuria.   Musculoskeletal:  Negative for arthralgias and myalgias.   Skin:  Positive for color change. Negative for pallor.   Neurological:  Negative for dizziness, syncope and headaches.   Psychiatric/Behavioral:  Negative for confusion and hallucinations.           Physical Exam  Constitutional:       General: He is awake. He is not in acute distress.     Appearance: He is not toxic-appearing.   HENT:      Head: Normocephalic and atraumatic.      Mouth/Throat:      Mouth: Mucous membranes are moist.   Eyes:      General: No scleral icterus.     Comments: Conjunctiva clear   Neck:      Vascular: No JVD.   Cardiovascular:      Heart sounds:      No friction rub.      Comments: Some distant heart sounds  Pulmonary:      Effort: Pulmonary effort is normal.      Breath sounds: Normal breath sounds.   Abdominal:      General: Bowel sounds are normal.      Palpations: Abdomen is soft.      Tenderness: There is no guarding or rebound.   Musculoskeletal:      Cervical back: Neck supple.      Comments: Trace edema   Skin:     General: Skin is warm and dry.   Neurological:      Mental Status: He is alert.      Comments: Cooperative with exam   Psychiatric:         Mood and Affect: Mood and affect normal.            Last Recorded Vitals  Blood pressure 104/58, pulse 88, temperature 37.1 °C (98.8 °F), temperature source Oral, resp. rate 20, height 1.778 m (5' 10\"), weight (!) 177 kg (390 lb), SpO2 (!) 93%.    Relevant Results  Results for orders placed or performed during the hospital encounter of 08/12/24 (from the past 24 hour(s))   CBC and Auto Differential   Result Value Ref " Range    WBC 17.4 (H) 4.4 - 11.3 x10*3/uL    nRBC 0.0 0.0 - 0.0 /100 WBCs    RBC 4.87 4.50 - 5.90 x10*6/uL    Hemoglobin 13.3 (L) 13.5 - 17.5 g/dL    Hematocrit 39.7 (L) 41.0 - 52.0 %    MCV 82 80 - 100 fL    MCH 27.3 26.0 - 34.0 pg    MCHC 33.5 32.0 - 36.0 g/dL    RDW 14.0 11.5 - 14.5 %    Platelets 165 150 - 450 x10*3/uL    Neutrophils % 90.3 40.0 - 80.0 %    Immature Granulocytes %, Automated 0.5 0.0 - 0.9 %    Lymphocytes % 2.6 13.0 - 44.0 %    Monocytes % 6.3 2.0 - 10.0 %    Eosinophils % 0.0 0.0 - 6.0 %    Basophils % 0.3 0.0 - 2.0 %    Neutrophils Absolute 15.71 (H) 1.20 - 7.70 x10*3/uL    Immature Granulocytes Absolute, Automated 0.09 0.00 - 0.70 x10*3/uL    Lymphocytes Absolute 0.46 (L) 1.20 - 4.80 x10*3/uL    Monocytes Absolute 1.10 (H) 0.10 - 1.00 x10*3/uL    Eosinophils Absolute 0.00 0.00 - 0.70 x10*3/uL    Basophils Absolute 0.05 0.00 - 0.10 x10*3/uL   Comprehensive metabolic panel   Result Value Ref Range    Glucose 546 (HH) 65 - 99 mg/dL    Sodium 123 (L) 133 - 145 mmol/L    Potassium 4.0 3.4 - 5.1 mmol/L    Chloride 86 (L) 97 - 107 mmol/L    Bicarbonate 20 (L) 24 - 31 mmol/L    Urea Nitrogen 29 (H) 8 - 25 mg/dL    Creatinine 1.50 0.40 - 1.60 mg/dL    eGFR 54 (L) >60 mL/min/1.73m*2    Calcium 9.1 8.5 - 10.4 mg/dL    Albumin 3.4 (L) 3.5 - 5.0 g/dL    Alkaline Phosphatase 96 35 - 125 U/L    Total Protein 8.1 (H) 5.9 - 7.9 g/dL    AST 18 5 - 40 U/L    Bilirubin, Total 1.0 0.1 - 1.2 mg/dL    ALT 19 5 - 40 U/L    Anion Gap 17 <=19 mmol/L   Lipase   Result Value Ref Range    Lipase 59 16 - 63 U/L   NT Pro-BNP   Result Value Ref Range    PROBNP 755 (H) 0 - 177 pg/mL   BLOOD GAS VENOUS   Result Value Ref Range    POCT pH, Venous 7.49 (H) 7.33 - 7.43 pH    POCT pCO2, Venous 30 (L) 41 - 51 mm Hg    POCT pO2, Venous 56 (H) 35 - 45 mm Hg    POCT SO2, Venous 90 (H) 45 - 75 %    POCT Oxy Hemoglobin, Venous 87.6 (H) 45.0 - 75.0 %    POCT Base Excess, Venous 0.4 -2.0 - 3.0 mmol/L    POCT HCO3 Calculated, Venous 22.9  22.0 - 26.0 mmol/L    Patient Temperature 37.0 degrees Celsius    FiO2 21 %   Beta Hydroxybutyrate   Result Value Ref Range    Beta-Hydroxybutyrate 1.50 (H) 0.10 - 0.30 mmol/L   Magnesium   Result Value Ref Range    Magnesium 1.30 (L) 1.60 - 3.10 mg/dL   Blood Gas Lactic Acid, Venous   Result Value Ref Range    POCT Lactate, Venous 2.4 (H) 0.4 - 2.0 mmol/L   Blood Culture    Specimen: Peripheral Venipuncture; Blood culture   Result Value Ref Range    Gram Stain Gram negative bacilli (AA)     Gram Stain Gram negative bacilli (AA)    Blood Culture    Specimen: Peripheral Venipuncture; Blood culture   Result Value Ref Range    Blood Culture       Identification and susceptibility testing to follow    Gram Stain Gram negative bacilli (AA)     Gram Stain Gram negative bacilli (AA)    Serial Troponin, Initial ([facility])   Result Value Ref Range    Troponin T, High Sensitivity 27 (HH) <=14 ng/L   ECG 12 lead   Result Value Ref Range    Ventricular Rate 134 BPM    Atrial Rate 134 BPM    NV Interval 154 ms    QRS Duration 92 ms    QT Interval 288 ms    QTC Calculation(Bazett) 430 ms    P Axis 54 degrees    R Axis -46 degrees    T Axis 66 degrees    QRS Count 22 beats    Q Onset 213 ms    P Onset 136 ms    P Offset 186 ms    T Offset 357 ms    QTC Fredericia 376 ms   Sars-CoV-2 PCR   Result Value Ref Range    Coronavirus 2019, PCR Not Detected Not Detected   Influenza A, and B PCR   Result Value Ref Range    Flu A Result Not Detected Not Detected    Flu B Result Not Detected Not Detected   Urinalysis with Reflex Culture and Microscopic   Result Value Ref Range    Color, Urine Yellow Light-Yellow, Yellow, Dark-Yellow    Appearance, Urine Ex.Turbid (N) Clear    Specific Gravity, Urine 1.023 1.005 - 1.035    pH, Urine 6.0 5.0, 5.5, 6.0, 6.5, 7.0, 7.5, 8.0    Protein, Urine 300 (3+) (A) NEGATIVE, 10 (TRACE), 20 (TRACE) mg/dL    Glucose, Urine OVER (4+) (A) Normal mg/dL    Blood, Urine OVER (3+) (A) NEGATIVE    Ketones, Urine TRACE  (A) NEGATIVE mg/dL    Bilirubin, Urine NEGATIVE NEGATIVE    Urobilinogen, Urine Normal Normal mg/dL    Nitrite, Urine NEGATIVE NEGATIVE    Leukocyte Esterase, Urine 500 [place]/µL (A) NEGATIVE   Microscopic Only, Urine   Result Value Ref Range    WBC, Urine >50 (A) 1-5, NONE /HPF    WBC Clumps, Urine MANY Reference range not established. /HPF    RBC, Urine >20 (A) NONE, 1-2, 3-5 /HPF    Squamous Epithelial Cells, Urine 1-9 (SPARSE) Reference range not established. /HPF    Bacteria, Urine 3+ (A) NONE SEEN /HPF    Mucus, Urine 1+ Reference range not established. /LPF   POCT GLUCOSE   Result Value Ref Range    POCT Glucose 526 (H) 74 - 99 mg/dL   POCT GLUCOSE   Result Value Ref Range    POCT Glucose 464 (H) 74 - 99 mg/dL   POCT GLUCOSE   Result Value Ref Range    POCT Glucose 437 (H) 74 - 99 mg/dL   Serial Troponin, 2 Hour ([facility])   Result Value Ref Range    Troponin T, High Sensitivity 28 (HH) <=14 ng/L   BLOOD GAS LACTIC ACID, VENOUS   Result Value Ref Range    POCT Lactate, Venous 1.7 0.4 - 2.0 mmol/L   Basic metabolic panel   Result Value Ref Range    Glucose 427 (H) 65 - 99 mg/dL    Sodium 124 (L) 133 - 145 mmol/L    Potassium 3.6 3.4 - 5.1 mmol/L    Chloride 89 (L) 97 - 107 mmol/L    Bicarbonate 21 (L) 24 - 31 mmol/L    Urea Nitrogen 36 (H) 8 - 25 mg/dL    Creatinine 2.00 (H) 0.40 - 1.60 mg/dL    eGFR 38 (L) >60 mL/min/1.73m*2    Calcium 8.2 (L) 8.5 - 10.4 mg/dL    Anion Gap 14 <=19 mmol/L   Creatine Kinase   Result Value Ref Range    Creatine Kinase 189 24 - 195 U/L   Magnesium   Result Value Ref Range    Magnesium 1.50 (L) 1.60 - 3.10 mg/dL   CBC   Result Value Ref Range    WBC 11.9 (H) 4.4 - 11.3 x10*3/uL    nRBC 0.0 0.0 - 0.0 /100 WBCs    RBC 4.32 (L) 4.50 - 5.90 x10*6/uL    Hemoglobin 11.7 (L) 13.5 - 17.5 g/dL    Hematocrit 34.7 (L) 41.0 - 52.0 %    MCV 80 80 - 100 fL    MCH 27.1 26.0 - 34.0 pg    MCHC 33.7 32.0 - 36.0 g/dL    RDW 14.1 11.5 - 14.5 %    Platelets 147 (L) 150 - 450 x10*3/uL   POCT GLUCOSE   Result  Value Ref Range    POCT Glucose 349 (H) 74 - 99 mg/dL   POCT GLUCOSE   Result Value Ref Range    POCT Glucose 383 (H) 74 - 99 mg/dL   Serial Troponin, 6 Hour ([facility])   Result Value Ref Range    Troponin T, High Sensitivity 27 (HH) <=14 ng/L   POCT GLUCOSE   Result Value Ref Range    POCT Glucose 379 (H) 74 - 99 mg/dL          Assessment/Plan   Acute kidney injury, he may have acute tubular necrosis in the setting of bacteremia, sepsis and volume depletion  Bacteremia  Hypomagnesemia  Hypertension  Diabetes mellitus with significant hyperglycemia    Plan: Continue IV fluids at this time as clinically he appears volume depleted and is septic.  Avoid NSAIDs.  Hold ACE inhibitor.  Avoid metformin.  Monitor culture results, renally dose antibiotics, infectious disease on board.  Replace magnesium, check urine magnesium and urine creatinine to workup hypomagnesemia.  Diabetes management per the primary team.  Thank you for your consultation.    Monserrat Byers MD

## 2024-08-13 NOTE — CARE PLAN
Problem: Bathing  Goal: STG - Patient will bathe lower body independent with AE as needed  8/13/2024 1532 by Bong Arreola, OT  Outcome: Progressing  8/13/2024 1531 by Bong Arreola, OT  Outcome: Progressing     Problem: Dressings Lower Extremities  Goal: LTG - Patient will dress lower body independent with AE as needed  Outcome: Progressing     Problem: Functional Mobility  Goal: patient will perform functional mobility a household/community distance independent with 2ww as needed  Outcome: Progressing     Problem: Toileting  Goal: STG - Patient will complete toileting tasks with 2ww  independent  Outcome: Progressing

## 2024-08-13 NOTE — PROGRESS NOTES
"Occupational Therapy    Evaluation/Treatment    Patient Name: Wilber Pennington \"Wong\"  MRN: [id number]  : 1970  Today's Date: 24  Time Calculation  Start Time: 1437  Stop Time: 1501  Time Calculation (min): 24 min       Assessment:  OT Assessment: Referral received, chart reviewed, and evaluation complete.  Presents from [facility] with c/o fatigue, cough and fever.  Would benefit from acute OT services.  Recommend low intensity rehab upon discharge.  Prognosis: Good  Barriers to Discharge: None  Evaluation/Treatment Tolerance: Patient limited by fatigue  Medical Staff Made Aware: Yes  End of Session Communication: Bedside nurse  End of Session Patient Position: Up in chair, Alarm off, caregiver present    Plan:  Treatment Interventions: ADL retraining, Endurance training, Patient/family training, Equipment evaluation/education, Compensatory technique education  OT Frequency: 3 times per week  OT Discharge Recommendations: Low intensity level of continued care  OT Recommended Transfer Status: Assist of 1  OT - OK to Discharge: Yes  Treatment Interventions: ADL retraining, Endurance training, Patient/family training, Equipment evaluation/education, Compensatory technique education    Subjective   Current Problem:  1. Pneumonia due to infectious organism, unspecified laterality, unspecified part of lung        2. Hyperglycemia        3. Sepsis, due to unspecified organism, unspecified whether acute organ dysfunction present (Multi)          General:   OT Received On: 24  General  Reason for Referral: decreased functional status  Referred By: Scot Pinto MD  Past Medical History Relevant to Rehab: DM, HTN, [place], KARINA, anxiety, blind right eye  Family/Caregiver Present: Yes  Caregiver Feedback: spouse was present and provided information on home setting and prior functional level  Prior to Session Communication: Bedside nurse  Patient Position Received: Up in chair, Alarm off, not on at start of session  General " Comment: 54 year old WM admitted with c/o fatigue, and cough    Precautions:  Hearing/Visual Limitations: hearing WFL, [facility] and wears glasses    Vital Signs:  Heart Rate: (!) 103  Heart Rate Source: Monitor  Respirations: 15  Pulse Ox: 95 %  BP: 119/73  BP Method: Automatic  Patient Position: Sitting    Pain:  Pain Assessment  Pain Assessment: 0-10  0-10 (Numeric) Pain Score: 0 - No pain    Objective   Cognition:  Overall Cognitive Status: Within Functional Limits      Home Living:  Type of Home: House  Lives With: Spouse  Home Adaptive Equipment: None  Home Layout: Two level  Home Access: Stairs to enter without rails  Entrance Stairs-Rails: None  Entrance Stairs-Number of Steps: 3  Bathroom Shower/Tub: Tub/shower unit  Bathroom Toilet: Standard  Bathroom Equipment: None    Prior Function:  Level of [place]: Independent with ADLs and functional transfers, Independent with homemaking with ambulation  ADL Assistance: Independent  Homemaking Assistance: Independent  Ambulatory Assistance: Independent  Vocational: Full time employment ()  Hand Dominance: Right    IADL History:  Homemaking Responsibilities: Yes  Current License: Yes  Mode of Transportation: Car    ADL:  Eating Assistance: Independent  Grooming Assistance: Independent  Bathing Assistance: Moderate  UE Dressing Assistance: Minimal  LE Dressing Assistance: Moderate  Toileting Assistance with Device: Minimal  Functional Assistance: Minimal      Activity Tolerance:  Endurance: Tolerates less than 10 min exercise with changes in vital signs       Bed Mobility/Transfers: Bed Mobility  Bed Mobility: No    Transfers  Transfer: Yes  Transfer 1  Transfer From 1: Sit to  Transfer to 1: Stand  Technique 1: Sit to stand  Transfer Device 1: Walker  Transfer Level of Assistance 1: Close supervision  Transfers 2  Transfer From 2: Stand to  Transfer to 2: Sit  Technique 2: Stand to sit  Transfer Device 2: Walker  Transfer Level of Assistance 2:  Close supervision    Sitting Balance:  Static Sitting Balance  Static Sitting-Balance Support: Feet supported  Static Sitting-Level of Assistance: Independent  Standing Balance:  Static Standing Balance  Static Standing-Balance Support: Bilateral upper extremity supported  Static Standing-Level of Assistance: Close supervision    Sensation:  Light Touch: No apparent deficits    Strength:  Strength Comments: BUE 3+/5     Coordination:  Movements are Fluid and Coordinated: Yes     Hand Function:  Hand Function  Gross Grasp: Functional  Coordination: Functional    Outcome Measures: [facility] Daily Activity  Putting on and taking off regular lower body clothing: A lot  Bathing (including washing, rinsing, drying): A lot  Putting on and taking off regular upper body clothing: A little  Toileting, which includes using toilet, bedpan or urinal: A little  Taking care of personal grooming such as brushing teeth: None  Eating Meals: None  Daily Activity - Total Score: 18      Goals:         Problem: Bathing  Goal: STG - Patient will bathe lower body independent with AE as needed  8/13/2024 1532 by Bong Arreola, OT  Outcome: Progressing  8/13/2024 1531 by Bong Arreola, OT  Outcome: Progressing     Problem: Dressings Lower Extremities  Goal: LTG - Patient will dress lower body independent with AE as needed  Outcome: Progressing     Problem: Functional Mobility  Goal: patient will perform functional mobility a household/community distance independent with 2ww as needed  Outcome: Progressing     Problem: Toileting  Goal: STG - Patient will complete toileting tasks with 2ww  independent  Outcome: Progressing

## 2024-08-13 NOTE — PROGRESS NOTES
Physical Therapy    Physical Therapy Evaluation & Treatment    Patient Name: Wong Pennington  MRN: [id number]  Today's Date: 8/13/2024   Time Calculation  Start Time: 1449  Stop Time: 1502  Time Calculation (min): 13 min    Assessment/Plan   PT Assessment  PT Assessment Results: Decreased endurance, Impaired balance, Decreased mobility, Decreased coordination, Decreased safety awareness  Rehab Prognosis: Good  Evaluation/Treatment Tolerance: Patient limited by fatigue  End of Session Communication: Bedside nurse  Assessment Comment: 54 year old male presents with decline from baseline functional mobility, impaired balance, and decreased tolerance to activity.  End of Session Patient Position: Up in chair, Alarm off, not on at start of session   IP OR SWING BED PT PLAN  Inpatient or Swing Bed: Inpatient  PT Plan  Treatment/Interventions: Bed mobility, Transfer training, Gait training, Stair training, Balance training, Strengthening, Endurance training, Therapeutic exercise, Therapeutic activity  PT Plan: Ongoing PT  PT Frequency: 4 times per week  PT Discharge Recommendations: Low intensity level of continued care  PT Recommended Transfer Status: Assist x1  PT - OK to Discharge: Yes      Subjective     General Visit Information:  General  Reason for Referral: Impaired mobility with pneumonia  Past Medical History Relevant to Rehab: anxiety, DM, HTN, KARINA, eye surgery  Prior to Session Communication: Bedside nurse  Patient Position Received: Up in room, Alarm off, not on at start of session  General Comment: 54 year old male admit from home with fatigue, hyperglycemia, equilibrium issues, cough, fevers, and chills  Home Living:  Home Living  Type of Home: House  Lives With: Spouse  Home Adaptive Equipment: None  Home Layout: Two level  Alternate Level Stairs-Rails:  (unilateral)  Alternate Level Stairs-Number of Steps: 10  Home Access: Stairs to enter with rails  Entrance Stairs-Number of Steps: 3  Prior Level of  Function:  Prior Function Per Pt/Caregiver Report  Ambulatory Assistance: Independent  Prior Function Comments: Drives;    Precautions:  Precautions  Medical Precautions: Fall precautions  Vital Signs:  Vital Signs  Pulse Ox: (!) 89 % (on room air after ambulation;  RN notified)    Objective   Pain:  Pain Assessment  Pain Assessment: 0-10  0-10 (Numeric) Pain Score: 0 - No pain  Cognition:  Cognition  Overall Cognitive Status: Within Functional Limits    General Assessments:                  Activity Tolerance  Endurance: Decreased tolerance for upright activites  Activity Tolerance Comments: fatigue, mild SOB    Sensation  Sensation Comment: reports intermittent episodes numbness cindy LE feet    Strength  Strength Comments: Strength assessed via function  Coordination  Movements are Fluid and Coordinated: No  Lower Body Coordination: slower rate of movement cindy LE    Static Sitting Balance  Static Sitting-Balance Support: No upper extremity supported  Static Sitting-Level of Assistance: Close supervision    Static Standing Balance  Static Standing-Balance Support: Bilateral upper extremity supported  Static Standing-Level of Assistance: Close supervision  Static Standing-Comment/Number of Minutes: Supervision with balance during static standing with rolling walker  Functional Assessments:  Bed Mobility  Bed Mobility: No    Transfers  Transfer: Yes  Transfer 1  Transfer From 1: Sit to  Transfer to 1: Stand  Technique 1: Sit to stand  Transfer Device 1: Walker  Transfer Level of Assistance 1: Close supervision  Trials/Comments 1: Supervision with balance;  verbal cues for hand placement  Transfers 2  Transfer From 2: Stand to  Transfer to 2: Sit  Technique 2: Stand to sit  Transfer Device 2: Walker  Transfer Level of Assistance 2: Close supervision  Trials/Comments 2: Supervision with balance;  verbal cues for hand placement    Ambulation/Gait Training  Ambulation/Gait Training Performed: Yes  Ambulation/Gait  Training 1  Surface 1: Level tile  Device 1: Rolling walker  Assistance 1: Minimum assistance  Comments/Distance (ft) 1: 10 feet x 1, 15 feet x 1, 25 feet x 1 with rolling walker and assist with balance;  verbal cues for posture;  patient ambulates with slow kayla, reciprocating gait pattern, and decreased step length cindy LE.    Stairs  Stairs: No  Extremity/Trunk Assessments:  RLE   RLE : Within Functional Limits  LLE   LLE : Within Functional Limits  Treatments:                 Bed Mobility  Bed Mobility: No    Ambulation/Gait Training  Ambulation/Gait Training Performed: Yes  Ambulation/Gait Training 1  Surface 1: Level tile  Device 1: Rolling walker  Assistance 1: Minimum assistance  Comments/Distance (ft) 1: 10 feet x 1, 15 feet x 1, 25 feet x 1 with rolling walker and assist with balance;  verbal cues for posture;  patient ambulates with slow kayla, reciprocating gait pattern, and decreased step length cindy LE.  Transfers  Transfer: Yes  Transfer 1  Transfer From 1: Sit to  Transfer to 1: Stand  Technique 1: Sit to stand  Transfer Device 1: Walker  Transfer Level of Assistance 1: Close supervision  Trials/Comments 1: Supervision with balance;  verbal cues for hand placement  Transfers 2  Transfer From 2: Stand to  Transfer to 2: Sit  Technique 2: Stand to sit  Transfer Device 2: Walker  Transfer Level of Assistance 2: Close supervision  Trials/Comments 2: Supervision with balance;  verbal cues for hand placement    Stairs  Stairs: No       Outcome Measures:  [facility] Basic Mobility  Turning from your back to your side while in a flat bed without using bedrails: A little  Moving from lying on your back to sitting on the side of a flat bed without using bedrails: A lot  Moving to and from bed to chair (including a wheelchair): A little  Standing up from a chair using your arms (e.g. wheelchair or bedside chair): A little  To walk in hospital room: A little  Climbing 3-5 steps with railing: A lot  Basic Mobility  - Total Score: 16    Encounter Problems       Encounter Problems (Active)       Mobility       Bed mobility including supine to sit and sit to supine independently.       Start:  08/13/24    Expected End:  08/27/24            Ambulate 100 feet +/- rolling walker independently. (Progressing)       Start:  08/13/24    Expected End:  08/27/24            Negotiate 3 steps with single handrail and min assist.       Start:  08/13/24    Expected End:  08/27/24               PT Transfers       Transfers including sit to stand and stand to sit independently. (Progressing)       Start:  08/13/24    Expected End:  08/27/24                   Education Documentation  Mobility Training, taught by Nathanael Lane, PT at 8/13/2024  3:18 PM.  Learner: Patient  Readiness: Acceptance  Method: Demonstration, Explanation  Response: Needs Reinforcement    Education Comments  No comments found.

## 2024-08-13 NOTE — PROGRESS NOTES
"Pharmacy Medication History Review    Wilber Pennington \"Wong\" is a 54 y.o. male admitted for Pneumonia. Pharmacy reviewed the patient's [id number]-[id number] medications and allergies for accuracy.    Medications ADDED:  Cetirizine 10mg   Medications CHANGED:  Hydrochlorothiazide 50mg  Metformin 500mg - duplicate   Medications REMOVED:   None      The list below reflects the updated PTA list. Comments regarding how patient may be taking medications differently can be found in the Admit Orders Activity  Prior to Admission Medications   Prescriptions Last Dose Informant   allopurinol (Zyloprim) 300 mg tablet 8/11/2024 Family Member   Sig: TAKE 1 TABLET DAILY   Patient taking differently: Take 1 tablet (300 mg) by mouth once daily at bedtime.   amLODIPine (Norvasc) 5 mg tablet 8/11/2024 Family Member   Sig: TAKE 1 TABLET ONCE DAILY   aspirin 81 mg EC tablet 8/11/2024 Family Member   Sig: Take 1 tablet (81 mg) by mouth once daily.   atorvastatin (Lipitor) 10 mg tablet 8/11/2024 Family Member   Sig: TAKE 1 TABLET DAILY   Patient taking differently: Take 1 tablet (10 mg) by mouth once daily at bedtime.   blood sugar diagnostic strip  Family Member   Sig: 3 times a day. As directed for 30 days.   busPIRone (Buspar) 10 mg tablet 8/11/2024 Family Member   Sig: TAKE 1 TABLET TWICE A DAY   cetirizine (ZyrTEC) 10 mg tablet 8/11/2024 Family Member   Sig: Take 1 tablet (10 mg) by mouth once daily.   cholecalciferol (Vitamin D-3) 25 MCG (1000 UT) tablet 8/11/2024 Family Member   Sig: Take 1 tablet (1,000 Units) by mouth 2 times a day. as directed Orally   cyanocobalamin (Vitamin B-12) 1,000 mcg tablet 8/11/2024 Family Member   Sig: Take 1 tablet (1,000 mcg) by mouth once daily. For 30 day(s)   fluticasone (Flonase) 50 mcg/actuation nasal spray Unknown Family Member   Sig: INSTILL 1 SPRAY INTO EACH NOSTRIL ONE TIME DAILY AS NEEDED   glucosam/chond/hyalu/CF borate (MOVE FREE JOINT HEALTH ORAL) 8/11/2024 Family Member   Sig: Take 1 " tablet by mouth 2 times a day.   hydroCHLOROthiazide (HYDRODiuril) 50 mg tablet 8/11/2024 Family Member   Sig: TAKE 2 TABLETS DAILY   Patient taking differently: Take 1 tablet (50 mg) by mouth once daily.   insulin degludec (Tresiba FlexTouch U-100) 100 unit/mL (3 mL) injection 8/11/2024 Family Member   Sig: INJECT 70 UNITS SUBCUTANEOUSLY ONE TIME DAILY AS DIRECTED   krill/om-3/dha/epa/phospho/ast (MEGARED OMEGA-3 KRILL OIL ORAL) 8/11/2024 Family Member   Sig: Take 500 mg by mouth once daily.   lisinopril 40 mg tablet 8/11/2024 Family Member   Sig: TAKE 1 TABLET DAILY   Patient taking differently: Take 1 tablet (40 mg) by mouth once daily at bedtime.   metFORMIN (Glucophage) 500 mg tablet     Sig: TAKE 2 TABLETS BY MOUTH TWO TIMES A DAY WITH A MEAL   metFORMIN (Glucophage) 500 mg tablet 8/11/2024 Family Member   Sig: TAKE 2 TABLETS 2 TIMES     DAILY WITH MEALS   metoprolol succinate XL (Toprol-XL) 50 mg 24 hr tablet 8/11/2024 Family Member   Sig: TAKE 2 AND 1/2 TABLETS ONCEDAILY   miscellaneous medical supply misc  Family Member   Sig: CPAP mask and supplies; 14 cmH2O with HH and a Respironics dreamwear medium FFM   multivitamin with minerals iron-free (Centrum Silver) 8/11/2024 Family Member   Sig: Take 1 tablet by mouth once daily.   potassium chloride CR (Klor-Con M10) 10 mEq ER tablet 8/11/2024 Family Member   Sig: TAKE 1 TABLET 2 TIMES DAILYWITH FOOD      Facility-Administered Medications: None        The list below reflects the updated allergy list. Please review each documented allergy for additional clarification and justification.  Allergies  Reviewed by Lulu Marr on 8/13/2024   No Known Allergies         Pharmacy has been updated to [facility].    Sources used to complete the med history include dispense history, PTA medication list, patient/family interview. Family member is a good historian.    Below are additional concerns with the patient's PTA list.  None     Lulu Marr,  CPhT-ADV  Please reach out via [employer] Secure Chat for questions

## 2024-08-13 NOTE — CARE PLAN
Problem: Mobility  Goal: Ambulate 100 feet +/- rolling walker independently.  Outcome: Progressing     Problem: PT Transfers  Goal: Transfers including sit to stand and stand to sit independently.  Outcome: Progressing

## 2024-08-13 NOTE — PROGRESS NOTES
"Wong Pennington is a 54 y.o. male on day 1 of admission presenting with Pneumonia.    Subjective   Patient resting in bed. States he had fever overnight and didn't sleep very well. Denies chest pain, shortness of breath, abdominal pain.        Objective     Physical Exam  Constitutional:       Appearance: He is obese.   HENT:      Head: Normocephalic and atraumatic.      Mouth/Throat:      Mouth: Mucous membranes are moist.      Pharynx: Oropharynx is clear.   Eyes:      Extraocular Movements: Extraocular movements intact.      Conjunctiva/sclera: Conjunctivae normal.      Pupils: Pupils are equal, round, and reactive to light.   Cardiovascular:      Rate and Rhythm: Normal rate and regular rhythm.      Pulses: Normal pulses.      Heart sounds: Normal heart sounds.   Pulmonary:      Effort: Pulmonary effort is normal.      Breath sounds: Normal breath sounds.   Abdominal:      General: Bowel sounds are normal.      Palpations: Abdomen is soft.      Tenderness: There is no abdominal tenderness.   Musculoskeletal:         General: Normal range of motion.      Cervical back: Normal range of motion and neck supple.   Skin:     General: Skin is warm and dry.      Capillary Refill: Capillary refill takes less than 2 seconds.      Comments: Scrotal erythema slightly improved today    Neurological:      General: No focal deficit present.      Mental Status: He is alert and oriented to person, place, and time.   Psychiatric:         Mood and Affect: Mood normal.         Behavior: Behavior normal.         Last Recorded Vitals  Blood pressure 104/58, pulse 88, temperature 37.1 °C (98.8 °F), temperature source Oral, resp. rate 20, height 1.778 m (5' 10\"), weight (!) 177 kg (390 lb), SpO2 (!) 93%.  Intake/Output last 3 Shifts:  No intake/output data recorded.    Relevant Results  Results for orders placed or performed during the hospital encounter of 08/12/24 (from the past 24 hour(s))   POCT GLUCOSE   Result Value Ref Range    " POCT Glucose 521 (H) 74 - 99 mg/dL   CBC and Auto Differential   Result Value Ref Range    WBC 17.4 (H) 4.4 - 11.3 x10*3/uL    nRBC 0.0 0.0 - 0.0 /100 WBCs    RBC 4.87 4.50 - 5.90 x10*6/uL    Hemoglobin 13.3 (L) 13.5 - 17.5 g/dL    Hematocrit 39.7 (L) 41.0 - 52.0 %    MCV 82 80 - 100 fL    MCH 27.3 26.0 - 34.0 pg    MCHC 33.5 32.0 - 36.0 g/dL    RDW 14.0 11.5 - 14.5 %    Platelets 165 150 - 450 x10*3/uL    Neutrophils % 90.3 40.0 - 80.0 %    Immature Granulocytes %, Automated 0.5 0.0 - 0.9 %    Lymphocytes % 2.6 13.0 - 44.0 %    Monocytes % 6.3 2.0 - 10.0 %    Eosinophils % 0.0 0.0 - 6.0 %    Basophils % 0.3 0.0 - 2.0 %    Neutrophils Absolute 15.71 (H) 1.20 - 7.70 x10*3/uL    Immature Granulocytes Absolute, Automated 0.09 0.00 - 0.70 x10*3/uL    Lymphocytes Absolute 0.46 (L) 1.20 - 4.80 x10*3/uL    Monocytes Absolute 1.10 (H) 0.10 - 1.00 x10*3/uL    Eosinophils Absolute 0.00 0.00 - 0.70 x10*3/uL    Basophils Absolute 0.05 0.00 - 0.10 x10*3/uL   Comprehensive metabolic panel   Result Value Ref Range    Glucose 546 (HH) 65 - 99 mg/dL    Sodium 123 (L) 133 - 145 mmol/L    Potassium 4.0 3.4 - 5.1 mmol/L    Chloride 86 (L) 97 - 107 mmol/L    Bicarbonate 20 (L) 24 - 31 mmol/L    Urea Nitrogen 29 (H) 8 - 25 mg/dL    Creatinine 1.50 0.40 - 1.60 mg/dL    eGFR 54 (L) >60 mL/min/1.73m*2    Calcium 9.1 8.5 - 10.4 mg/dL    Albumin 3.4 (L) 3.5 - 5.0 g/dL    Alkaline Phosphatase 96 35 - 125 U/L    Total Protein 8.1 (H) 5.9 - 7.9 g/dL    AST 18 5 - 40 U/L    Bilirubin, Total 1.0 0.1 - 1.2 mg/dL    ALT 19 5 - 40 U/L    Anion Gap 17 <=19 mmol/L   Lipase   Result Value Ref Range    Lipase 59 16 - 63 U/L   NT Pro-BNP   Result Value Ref Range    PROBNP 755 (H) 0 - 177 pg/mL   BLOOD GAS VENOUS   Result Value Ref Range    POCT pH, Venous 7.49 (H) 7.33 - 7.43 pH    POCT pCO2, Venous 30 (L) 41 - 51 mm Hg    POCT pO2, Venous 56 (H) 35 - 45 mm Hg    POCT SO2, Venous 90 (H) 45 - 75 %    POCT Oxy Hemoglobin, Venous 87.6 (H) 45.0 - 75.0 %     POCT Base Excess, Venous 0.4 -2.0 - 3.0 mmol/L    POCT HCO3 Calculated, Venous 22.9 22.0 - 26.0 mmol/L    Patient Temperature 37.0 degrees Celsius    FiO2 21 %   Beta Hydroxybutyrate   Result Value Ref Range    Beta-Hydroxybutyrate 1.50 (H) 0.10 - 0.30 mmol/L   Magnesium   Result Value Ref Range    Magnesium 1.30 (L) 1.60 - 3.10 mg/dL   Blood Gas Lactic Acid, Venous   Result Value Ref Range    POCT Lactate, Venous 2.4 (H) 0.4 - 2.0 mmol/L   Blood Culture    Specimen: Peripheral Venipuncture; Blood culture   Result Value Ref Range    Gram Stain Gram negative bacilli (AA)     Gram Stain Gram negative bacilli (AA)    Blood Culture    Specimen: Peripheral Venipuncture; Blood culture   Result Value Ref Range    Blood Culture       Identification and susceptibility testing to follow    Gram Stain Gram negative bacilli (AA)     Gram Stain Gram negative bacilli (AA)    Serial Troponin, Initial ([facility])   Result Value Ref Range    Troponin T, High Sensitivity 27 (HH) <=14 ng/L   ECG 12 lead   Result Value Ref Range    Ventricular Rate 134 BPM    Atrial Rate 134 BPM    NV Interval 154 ms    QRS Duration 92 ms    QT Interval 288 ms    QTC Calculation(Bazett) 430 ms    P Axis 54 degrees    R Axis -46 degrees    T Axis 66 degrees    QRS Count 22 beats    Q Onset 213 ms    P Onset 136 ms    P Offset 186 ms    T Offset 357 ms    QTC Fredericia 376 ms   Sars-CoV-2 PCR   Result Value Ref Range    Coronavirus 2019, PCR Not Detected Not Detected   Influenza A, and B PCR   Result Value Ref Range    Flu A Result Not Detected Not Detected    Flu B Result Not Detected Not Detected   Urinalysis with Reflex Culture and Microscopic   Result Value Ref Range    Color, Urine Yellow Light-Yellow, Yellow, Dark-Yellow    Appearance, Urine Ex.Turbid (N) Clear    Specific Gravity, Urine 1.023 1.005 - 1.035    pH, Urine 6.0 5.0, 5.5, 6.0, 6.5, 7.0, 7.5, 8.0    Protein, Urine 300 (3+) (A) NEGATIVE, 10 (TRACE), 20 (TRACE) mg/dL    Glucose, Urine OVER  (4+) (A) Normal mg/dL    Blood, Urine OVER (3+) (A) NEGATIVE    Ketones, Urine TRACE (A) NEGATIVE mg/dL    Bilirubin, Urine NEGATIVE NEGATIVE    Urobilinogen, Urine Normal Normal mg/dL    Nitrite, Urine NEGATIVE NEGATIVE    Leukocyte Esterase, Urine 500 Jesika/µL (A) NEGATIVE   Microscopic Only, Urine   Result Value Ref Range    WBC, Urine >50 (A) 1-5, NONE /HPF    WBC Clumps, Urine MANY Reference range not established. /HPF    RBC, Urine >20 (A) NONE, 1-2, 3-5 /HPF    Squamous Epithelial Cells, Urine 1-9 (SPARSE) Reference range not established. /HPF    Bacteria, Urine 3+ (A) NONE SEEN /HPF    Mucus, Urine 1+ Reference range not established. /LPF   POCT GLUCOSE   Result Value Ref Range    POCT Glucose 526 (H) 74 - 99 mg/dL   POCT GLUCOSE   Result Value Ref Range    POCT Glucose 464 (H) 74 - 99 mg/dL   POCT GLUCOSE   Result Value Ref Range    POCT Glucose 437 (H) 74 - 99 mg/dL   Serial Troponin, 2 Hour ([facility])   Result Value Ref Range    Troponin T, High Sensitivity 28 (HH) <=14 ng/L   BLOOD GAS LACTIC ACID, VENOUS   Result Value Ref Range    POCT Lactate, Venous 1.7 0.4 - 2.0 mmol/L   Basic metabolic panel   Result Value Ref Range    Glucose 427 (H) 65 - 99 mg/dL    Sodium 124 (L) 133 - 145 mmol/L    Potassium 3.6 3.4 - 5.1 mmol/L    Chloride 89 (L) 97 - 107 mmol/L    Bicarbonate 21 (L) 24 - 31 mmol/L    Urea Nitrogen 36 (H) 8 - 25 mg/dL    Creatinine 2.00 (H) 0.40 - 1.60 mg/dL    eGFR 38 (L) >60 mL/min/1.73m*2    Calcium 8.2 (L) 8.5 - 10.4 mg/dL    Anion Gap 14 <=19 mmol/L   Creatine Kinase   Result Value Ref Range    Creatine Kinase 189 24 - 195 U/L   Magnesium   Result Value Ref Range    Magnesium 1.50 (L) 1.60 - 3.10 mg/dL   CBC   Result Value Ref Range    WBC 11.9 (H) 4.4 - 11.3 x10*3/uL    nRBC 0.0 0.0 - 0.0 /100 WBCs    RBC 4.32 (L) 4.50 - 5.90 x10*6/uL    Hemoglobin 11.7 (L) 13.5 - 17.5 g/dL    Hematocrit 34.7 (L) 41.0 - 52.0 %    MCV 80 80 - 100 fL    MCH 27.1 26.0 - 34.0 pg    MCHC 33.7 32.0 - 36.0 g/dL     RDW 14.1 11.5 - 14.5 %    Platelets 147 (L) 150 - 450 x10*3/uL   POCT GLUCOSE   Result Value Ref Range    POCT Glucose 349 (H) 74 - 99 mg/dL   POCT GLUCOSE   Result Value Ref Range    POCT Glucose 383 (H) 74 - 99 mg/dL   Serial Troponin, 6 Hour ([facility])   Result Value Ref Range    Troponin T, High Sensitivity 27 (HH) <=14 ng/L   POCT GLUCOSE   Result Value Ref Range    POCT Glucose 379 (H) 74 - 99 mg/dL     CT abdomen pelvis wo IV contrast    Result Date: 8/13/2024  Interpreted By:  Lalo Santana, STUDY: CT ABDOMEN PELVIS WO IV CONTRAST;  8/13/2024 11:52 am   INDICATION: Signs/Symptoms:Gram negative septicemia.   COMPARISON: None   ACCESSION NUMBER(S): [id number]   ORDERING CLINICIAN: PIPER BADILLO   TECHNIQUE: CT of the abdomen and pelvis was performed. Contiguous axial images were obtained through the abdomen and pelvis. Coronal and sagittal reconstructions at 3 mm slice thickness were performed.  No intravenous contrast was administered.   FINDINGS: Please note that the evaluation of vessels, lymph nodes and organs is limited without intravenous contrast.   LOWER CHEST: There is no acute abnormality in the lower chest. The heart is normal in size without evidence of pericardial effusion. No pleural effusion is present.   ABDOMEN:   LIVER: The liver is markedly enlarged but normal in contour.   BILE DUCTS: The intrahepatic and extrahepatic ducts are not dilated.   GALLBLADDER: The gallbladder is nondistended and without evidence of radiopaque stones.   PANCREAS: The pancreas is not enlarged.   SPLEEN: Within normal limits.   ADRENAL GLANDS: Within normal limits.   KIDNEYS AND URETERS: There are multiple large right intrarenal calculi including 1.5 cm calculus within the right renal pelvis. No hydronephrosis or perinephric edema.   PELVIS:   BLADDER: The urinary bladder is incompletely distended, limiting assessment.   REPRODUCTIVE ORGANS: No pelvic masses.   BOWEL: The stomach is incompletely distended, limiting  evaluation for focal wall thickening. There is no bowel wall dilatation or obstruction. The appendix is normal. Large bowel is mostly collapsed without evidence of bowel wall thickening or acute inflammatory change..   VESSELS: The abdominal aorta is normal in caliber. No significant atherosclerotic calcifications.   PERITONEUM/RETROPERITONEUM/LYMPH NODES: There is no ascites or intraperitoneal free air. There is no lymphadenopathy by CT criteria.   ABDOMINAL WALL: There is a fat containing ventral abdominal wall hernia.   BONES: No suspicious osseous lesions are identified. Degenerative discogenic disease is noted predominantly in the lower lumbar spine.       1.  Note that evaluation of solid organs is somewhat limited due to absence of intravenous contrast and due to photon starvation artifact. Large right intrarenal calculi including 1.5 cm calculus within the right renal pelvis. No hydronephrosis. No evidence of acute bowel pathology. Hepatomegaly.     Signed by: Lalo Santana 8/13/2024 12:32 PM Dictation workstation:   [id number]    ECG 12 lead    Result Date: 8/13/2024  Sinus tachycardia Left anterior fascicular block Abnormal ECG    XR chest 1 view    Result Date: 8/12/2024  Interpreted By:  Soledad Post, STUDY: XR CHEST 1 VIEW; ;  8/12/2024 3:10 pm   INDICATION: Signs/Symptoms:fever, sob.   COMPARISON: None.   ACCESSION NUMBER(S): [id number]   ORDERING CLINICIAN: LINO MONTE   FINDINGS: Cardiac silhouette is mildly enlarged. There is prominence of interstitial markings in bilateral lungs with perihilar vascular prominence. There are ill-defined reticular airspace opacities in bilateral mid to lower lung zones. No large pleural effusions or pneumothorax within limits of portable technique. No acute osseous findings.       1. Mild cardiomegaly with suggestion of mild interstitial edema. Recommend correlation with fluid status and cardiac function. 2. Suggestion of ill-defined reticular opacities in  bilateral mid to lower lung zones. While these could be related to interstitial edema, pneumonia secondary to atypical infectious etiology can be considered in the differential. Recommend clinical and laboratory correlation.       MACRO: None   Signed by: Soledad Post 8/12/2024 3:39 PM Dictation workstation:   [id number]         Assessment/Plan   Principal Problem:    Pneumonia              X-ray with bilateral opacities concerning for pneumonia   - Per ID today, seems unlikely source of infection.               Elevated WBC, fever               IV antibiotics               O2 as needed - tolerating room air today               Check AM labs               Duonebs PRN               Respiratory therapy consult   Active Problems:    Bacteremia    Blood cultures x 2 positive for gram neg bacilli    Consult ID - appreciate recs    IV antibiotics per ID recommendations     UTI    May be source of bacteremia    ID consult    IV antibiotics     SEAN    Baseline creatinine ~ 0.9   1.5 on admission and 2.0 today    Hold lisinopril    Consult nephrology     Anxiety              Continue Buspar     Hypertension              Continue lisinopril, hydrochlorothiazide, amlodipine and Toprol    - In the setting of sepsis, hold hydrochlorothiazide, lisinopril and amlodipint    - change Toprol to metoprolol and decrease dose to 50 mg twice daily    Resume home meds when able     KARINA (obstructive sleep apnea)              Respiratory therapy consulted               BiPAP at night     Type 2 diabetes mellitus (Multi)              Hold home metformin               Glucose every 4 hours with SSI coverage  - change to AC/HS today               Lantus 40 units nightly (takes Tresiba 70 units daily at home)               Hypoglycemia coverage     Hyperglycemia due to diabetes mellitus (Multi)              See above regarding diabetes               Carb controlled diet today     Yeast infection of the skin              Topical Nystatin  powder               Probiotic while on IV antibiotics     DVT prophylaxis               Lovenox          Aleena Novak, APRN-CNP

## 2024-08-14 ENCOUNTER — APPOINTMENT (OUTPATIENT)
Dept: CARDIOLOGY | Facility: HOSPITAL | Age: 54
End: 2024-08-14
Payer: COMMERCIAL

## 2024-08-14 PROBLEM — N17.9 AKI (ACUTE KIDNEY INJURY) (CMS-HCC): Status: ACTIVE | Noted: 2024-08-14

## 2024-08-14 PROBLEM — R78.81 BACTEREMIA: Chronic | Status: ACTIVE | Noted: 2024-08-14

## 2024-08-14 PROBLEM — N30.00 ACUTE CYSTITIS WITHOUT HEMATURIA: Status: ACTIVE | Noted: 2024-08-14

## 2024-08-14 PROBLEM — R78.81 BACTEREMIA: Status: ACTIVE | Noted: 2024-08-14

## 2024-08-14 PROBLEM — N30.00 ACUTE CYSTITIS WITHOUT HEMATURIA: Chronic | Status: ACTIVE | Noted: 2024-08-14

## 2024-08-14 LAB
ANION GAP SERPL CALC-SCNC: 13 MMOL/L
BASOPHILS # BLD AUTO: 0.04 X10*3/UL (ref 0–0.1)
BASOPHILS NFR BLD AUTO: 0.4 %
BUN SERPL-MCNC: 43 MG/DL (ref 8–25)
CALCIUM SERPL-MCNC: 8.4 MG/DL (ref 8.5–10.4)
CHLORIDE SERPL-SCNC: 91 MMOL/L (ref 97–107)
CO2 SERPL-SCNC: 22 MMOL/L (ref 24–31)
CREAT SERPL-MCNC: 2.3 MG/DL (ref 0.4–1.6)
EGFRCR SERPLBLD CKD-EPI 2021: 33 ML/MIN/1.73M*2
EOSINOPHIL # BLD AUTO: 0.18 X10*3/UL (ref 0–0.7)
EOSINOPHIL NFR BLD AUTO: 1.6 %
ERYTHROCYTE [DISTWIDTH] IN BLOOD BY AUTOMATED COUNT: 14.1 % (ref 11.5–14.5)
GLUCOSE BLD MANUAL STRIP-MCNC: 239 MG/DL (ref 74–99)
GLUCOSE BLD MANUAL STRIP-MCNC: 252 MG/DL (ref 74–99)
GLUCOSE BLD MANUAL STRIP-MCNC: 270 MG/DL (ref 74–99)
GLUCOSE BLD MANUAL STRIP-MCNC: 278 MG/DL (ref 74–99)
GLUCOSE BLD MANUAL STRIP-MCNC: 304 MG/DL (ref 74–99)
GLUCOSE SERPL-MCNC: 257 MG/DL (ref 65–99)
HCT VFR BLD AUTO: 36 % (ref 41–52)
HGB BLD-MCNC: 11.7 G/DL (ref 13.5–17.5)
IMM GRANULOCYTES # BLD AUTO: 0.09 X10*3/UL (ref 0–0.7)
IMM GRANULOCYTES NFR BLD AUTO: 0.8 % (ref 0–0.9)
LYMPHOCYTES # BLD AUTO: 0.89 X10*3/UL (ref 1.2–4.8)
LYMPHOCYTES NFR BLD AUTO: 8.1 %
MAGNESIUM SERPL-MCNC: 2.1 MG/DL (ref 1.6–3.1)
MCH RBC QN AUTO: 26.8 PG (ref 26–34)
MCHC RBC AUTO-ENTMCNC: 32.5 G/DL (ref 32–36)
MCV RBC AUTO: 83 FL (ref 80–100)
MONOCYTES # BLD AUTO: 1.22 X10*3/UL (ref 0.1–1)
MONOCYTES NFR BLD AUTO: 11.1 %
NEUTROPHILS # BLD AUTO: 8.55 X10*3/UL (ref 1.2–7.7)
NEUTROPHILS NFR BLD AUTO: 78 %
NRBC BLD-RTO: 0 /100 WBCS (ref 0–0)
PLATELET # BLD AUTO: 151 X10*3/UL (ref 150–450)
POTASSIUM SERPL-SCNC: 3.2 MMOL/L (ref 3.4–5.1)
RBC # BLD AUTO: 4.36 X10*6/UL (ref 4.5–5.9)
SODIUM SERPL-SCNC: 126 MMOL/L (ref 133–145)
WBC # BLD AUTO: 11 X10*3/UL (ref 4.4–11.3)

## 2024-08-14 PROCEDURE — 2500000001 HC RX 250 WO HCPCS SELF ADMINISTERED DRUGS (ALT 637 FOR MEDICARE OP): Performed by: NURSE PRACTITIONER

## 2024-08-14 PROCEDURE — 82947 ASSAY GLUCOSE BLOOD QUANT: CPT

## 2024-08-14 PROCEDURE — 2500000004 HC RX 250 GENERAL PHARMACY W/ HCPCS (ALT 636 FOR OP/ED): Performed by: INTERNAL MEDICINE

## 2024-08-14 PROCEDURE — 2500000002 HC RX 250 W HCPCS SELF ADMINISTERED DRUGS (ALT 637 FOR MEDICARE OP, ALT 636 FOR OP/ED): Performed by: INTERNAL MEDICINE

## 2024-08-14 PROCEDURE — 97110 THERAPEUTIC EXERCISES: CPT | Mod: GP

## 2024-08-14 PROCEDURE — 94660 CPAP INITIATION&MGMT: CPT

## 2024-08-14 PROCEDURE — 2500000004 HC RX 250 GENERAL PHARMACY W/ HCPCS (ALT 636 FOR OP/ED): Performed by: FAMILY MEDICINE

## 2024-08-14 PROCEDURE — 83735 ASSAY OF MAGNESIUM: CPT | Performed by: NURSE PRACTITIONER

## 2024-08-14 PROCEDURE — 36415 COLL VENOUS BLD VENIPUNCTURE: CPT | Performed by: INTERNAL MEDICINE

## 2024-08-14 PROCEDURE — 2060000001 HC INTERMEDIATE ICU ROOM DAILY

## 2024-08-14 PROCEDURE — 2500000002 HC RX 250 W HCPCS SELF ADMINISTERED DRUGS (ALT 637 FOR MEDICARE OP, ALT 636 FOR OP/ED): Performed by: NURSE PRACTITIONER

## 2024-08-14 PROCEDURE — 2500000004 HC RX 250 GENERAL PHARMACY W/ HCPCS (ALT 636 FOR OP/ED): Performed by: NURSE PRACTITIONER

## 2024-08-14 PROCEDURE — 97116 GAIT TRAINING THERAPY: CPT | Mod: GP

## 2024-08-14 PROCEDURE — 80048 BASIC METABOLIC PNL TOTAL CA: CPT | Performed by: INTERNAL MEDICINE

## 2024-08-14 PROCEDURE — 85025 COMPLETE CBC W/AUTO DIFF WBC: CPT | Performed by: INTERNAL MEDICINE

## 2024-08-14 PROCEDURE — 93005 ELECTROCARDIOGRAM TRACING: CPT

## 2024-08-14 RX ORDER — HEPARIN SODIUM 5000 [USP'U]/ML
5000 INJECTION, SOLUTION INTRAVENOUS; SUBCUTANEOUS EVERY 8 HOURS
Status: DISCONTINUED | OUTPATIENT
Start: 2024-08-14 | End: 2024-08-16 | Stop reason: HOSPADM

## 2024-08-14 RX ORDER — CEFTRIAXONE 2 G/50ML
2 INJECTION, SOLUTION INTRAVENOUS EVERY 24 HOURS
Status: DISCONTINUED | OUTPATIENT
Start: 2024-08-14 | End: 2024-08-16

## 2024-08-14 RX ORDER — POTASSIUM CHLORIDE 20 MEQ/1
20 TABLET, EXTENDED RELEASE ORAL ONCE
Status: COMPLETED | OUTPATIENT
Start: 2024-08-14 | End: 2024-08-14

## 2024-08-14 RX ORDER — GUAIFENESIN 600 MG/1
600 TABLET, EXTENDED RELEASE ORAL 2 TIMES DAILY PRN
Status: DISCONTINUED | OUTPATIENT
Start: 2024-08-14 | End: 2024-08-15

## 2024-08-14 RX ORDER — LOPERAMIDE HYDROCHLORIDE 2 MG/1
2 CAPSULE ORAL 4 TIMES DAILY PRN
Status: DISCONTINUED | OUTPATIENT
Start: 2024-08-14 | End: 2024-08-16 | Stop reason: HOSPADM

## 2024-08-14 ASSESSMENT — COGNITIVE AND FUNCTIONAL STATUS - GENERAL
CLIMB 3 TO 5 STEPS WITH RAILING: A LOT
DAILY ACTIVITIY SCORE: 22
TURNING FROM BACK TO SIDE WHILE IN FLAT BAD: A LITTLE
MOVING TO AND FROM BED TO CHAIR: A LITTLE
MOBILITY SCORE: 17
WALKING IN HOSPITAL ROOM: A LITTLE
MOVING FROM LYING ON BACK TO SITTING ON SIDE OF FLAT BED WITH BEDRAILS: A LITTLE
STANDING UP FROM CHAIR USING ARMS: A LITTLE
MOVING FROM LYING ON BACK TO SITTING ON SIDE OF FLAT BED WITH BEDRAILS: A LITTLE
MOBILITY SCORE: 18
TOILETING: A LITTLE
STANDING UP FROM CHAIR USING ARMS: A LITTLE
TURNING FROM BACK TO SIDE WHILE IN FLAT BAD: A LITTLE
CLIMB 3 TO 5 STEPS WITH RAILING: A LITTLE
WALKING IN HOSPITAL ROOM: A LITTLE
MOVING TO AND FROM BED TO CHAIR: A LITTLE
PATIENT BASELINE BEDBOUND: NO
DRESSING REGULAR LOWER BODY CLOTHING: A LITTLE

## 2024-08-14 ASSESSMENT — ACTIVITIES OF DAILY LIVING (ADL)
ASSISTIVE_DEVICE: EYEGLASSES
WALKS IN HOME: INDEPENDENT
GROOMING: INDEPENDENT
PATIENT'S MEMORY ADEQUATE TO SAFELY COMPLETE DAILY ACTIVITIES?: YES
TOILETING: NEEDS ASSISTANCE
FEEDING YOURSELF: INDEPENDENT
DRESSING YOURSELF: NEEDS ASSISTANCE
ADEQUATE_TO_COMPLETE_ADL: YES
HEARING - LEFT EAR: FUNCTIONAL
JUDGMENT_ADEQUATE_SAFELY_COMPLETE_DAILY_ACTIVITIES: YES
BATHING: NEEDS ASSISTANCE
HEARING - RIGHT EAR: FUNCTIONAL

## 2024-08-14 ASSESSMENT — PAIN SCALES - GENERAL
PAINLEVEL_OUTOF10: 0 - NO PAIN

## 2024-08-14 ASSESSMENT — PAIN - FUNCTIONAL ASSESSMENT: PAIN_FUNCTIONAL_ASSESSMENT: 0-10

## 2024-08-14 NOTE — CONSULTS
"Nutrition Assessement Note    Nutrition Assessment    Reason for Assessment: Provider consult order (Education)    Reason for Hospital Admission:  Wilber Pennington \"Wong\" is a 54 y.o. male who is admitted for hyperglycemia, possible PNA. Patient with Hx of DM2, non-compliant with medication since February. Patient states he has not been on a carbohydrate controlled diet in the past but is interested in education at this time. Family present, taking notes.    Past Medical History:   Diagnosis Date    Anxiety     Diabetes mellitus (Multi)     Hypertension     KARINA (obstructive sleep apnea)       Past Surgical History:   Procedure Laterality Date    EYE SURGERY      VASECTOMY         Nutrition History:  Food and Nutrient History: Patient reports good appetite  Energy Intake: Good > 75 %  Food Allergies/Intolerances:  None  GI Symptoms: None  Oral Problems: None    Anthropometrics:  Ht: 177.8 cm (5' 10\"), Wt: (!) 176 kg (387 lb 6.4 oz), BMI: 55.59  IBW/kg (Dietitian Calculated): 75.45 kg  Percent of IBW: 233 %  Adjusted Body Weight (kg): 100 kg    Weight Change:  Daily Weight  08/14/24 : (!) 176 kg (387 lb 6.4 oz)  07/03/23 : (!) 173 kg (380 lb 6.4 oz)  02/14/23 : (!) 184 kg (406 lb)  01/30/23 : (!) 184 kg (406 lb)  06/27/22 : (!) 175 kg (386 lb 6.4 oz)  12/03/21 : (!) 183 kg (403 lb)  06/21/21 : (!) 176 kg (389 lb)  02/03/21 : (!) 184 kg (405 lb)  01/06/21 : (!) 184 kg (405 lb)     Nutrition Focused Physical Exam Findings:   Subcutaneous Fat Loss  Orbital Fat Pads: Well nourished (slightly bulging fat pads)  Buccal Fat Pads: Well nourished (full, rounded cheeks)    Muscle Wasting  Temporalis: Well nourished (well-defined muscle)  Pectoralis (Clavicular Region): Well nourished (clavicle not visible)  Deltoid/Trapezius: Well nourished (rounded appearance at arm, shoulder, neck)    Nutrition Significant Labs:  Lab Results   Component Value Date    WBC 11.0 08/14/2024    HGB 11.7 (L) 08/14/2024    HCT 36.0 (L) 08/14/2024    "  08/14/2024    CHOL 119 (L) 06/29/2023    TRIG 162 (H) 06/29/2023    HDL 43 06/29/2023    ALT 19 08/12/2024    AST 18 08/12/2024     (L) 08/14/2024    K 3.2 (L) 08/14/2024    CL 91 (L) 08/14/2024    CREATININE 2.30 (H) 08/14/2024    BUN 43 (H) 08/14/2024    CO2 22 (L) 08/14/2024    TSH 0.99 06/29/2023    PSA 0.7 06/29/2023    HGBA1C 12.8 (H) 06/29/2023    ALBUR 121 (H) 06/29/2023     Nutrition Specific Medications:  allopurinol, 300 mg, oral, Nightly  [Held by provider] amLODIPine, 5 mg, oral, Daily  aspirin, 81 mg, oral, Daily  atorvastatin, 10 mg, oral, Nightly  busPIRone, 10 mg, oral, BID  cholecalciferol, 1,000 Units, oral, BID  fluticasone, 1 spray, Each Nostril, Daily  heparin, 5,000 Units, subcutaneous, q8h  [Held by provider] hydroCHLOROthiazide, 50 mg, oral, Daily  insulin glargine, 50 Units, subcutaneous, Nightly  insulin lispro, 0-20 Units, subcutaneous, TID  lactobacillus acidophilus, 1 tablet, oral, BID  [Held by provider] lisinopril, 40 mg, oral, Nightly  metoprolol tartrate, 50 mg, oral, BID  nystatin, 1 Application, Topical, BID  piperacillin-tazobactam, 4.5 g, intravenous, q6h  potassium chloride CR, 10 mEq, oral, BID after meals      sodium chloride 0.9%, 100 mL/hr, Last Rate: 100 mL/hr (08/14/24 0904)      Dietary Orders (From admission, onward)       Start     Ordered    08/13/24 0828  Adult diet Regular, Consistent Carb; CCD 45 gm/meal  Diet effective now        Question Answer Comment   Diet type Regular    Diet type Consistent Carb    Carb diet selection: CCD 45 gm/meal        08/13/24 0827                   Estimated Needs:   Estimated Energy Needs  Total Energy Estimated Needs (kCal):  (6552-3299)  Total Estimated Energy Need per Day (kCal/kg):  (22-25)  Method for Estimating Needs: Adj Wt    Estimated Protein Needs  Total Protein Estimated Needs (g):  ()  Total Protein Estimated Needs (g/kg):  (.8-1.0)  Method for Estimating Needs: Adj Wt    Estimated Fluid Needs  Total  Fluid Estimated Needs (mL): 2200 mL  Total Fluid Estimated Needs (mL/kg): 22 mL/kg  Method for Estimating Needs: Adj Wt      Nutrition Diagnosis   Nutrition Diagnosis:     Nutrition Diagnosis  Patient has Nutrition Diagnosis: Yes  Diagnosis Status (1): New  Nutrition Diagnosis 1: Food and nutrition related knowledge deficit  Related to (1): lack of prior exposure to accurate nutrition related information  unwilling or disinterested in learning/applying information  As Evidenced by (1): conditions associated with diagnosis     Nutrition Interventions/Recommendations   Nutrition Interventions and Recommendations:    Nutrition Prescription:  Individualized Nutrition Prescription Provided for : 0260-9741 calories,  gm protein via CCD diet    Nutrition Interventions:   Food and/or Nutrient Delivery Interventions  Interventions: Meals and snacks  Meals and Snacks: Carbohydrate-modified diet  Goal: provide as ordered    Education Documentation  Nutrition Care Manual, taught by Nallely Starr RD, LD at 8/14/2024 11:29 AM.  Learner: Significant Other, Family, Patient  Readiness: Eager  Method: Explanation, Handout  Response: Verbalizes Understanding  Comment: Provided handout/booklet for carbohydrate counting for people with diabetes. Answered all patient/family questions.           Nutrition Monitoring and Evaluation   Monitoring/Evaluation:   Food/Nutrient Related History Monitoring  Monitoring and Evaluation Plan: Energy intake  Energy Intake: Estimated energy intake  Criteria: pt to consume >/= 75% estimated needs  Additional Plans: pt will plan meals within prescribed guidelines       Time Spent/Follow-up:   Follow Up  Time Spent (min): 30 minutes  Last Date of Nutrition Visit: 08/14/24  Nutrition Follow-Up Needed?: 7-10 days  Follow up Comment: 8/21/24

## 2024-08-14 NOTE — CARE PLAN
Problem: Mobility  Goal: Bed mobility including supine to sit and sit to supine independently.  Outcome: Progressing  Goal: Ambulate 100 feet +/- rolling walker independently.  Outcome: Progressing     Problem: PT Transfers  Goal: Transfers including sit to stand and stand to sit independently.  Outcome: Progressing

## 2024-08-14 NOTE — DOCUMENTATION CLARIFICATION NOTE
"    PATIENT:               LINO COLLIER  ACCT #:                  [id number]  MRN:                       [id number]  :                       1970  ADMIT DATE:       2024 2:32 PM  DISCH DATE:  RESPONDING PROVIDER #:        [id number]          PROVIDER RESPONSE TEXT:    Gram negative sepsis 2/2 UTI with renal organ dysfunction of SEAN    CDI QUERY TEXT:    Clarification    Instruction:    Based on your assessment of the patient and the clinical information, please provide the requested documentation by clicking on the appropriate radio button and enter any additional information if prompted.    Question: Please further clarify if a relationship exists between the Sepsis and acute organ dysfunction    When answering this query, please exercise your independent professional judgment. The fact that a question is being asked, does not imply that any particular answer is desired or expected.    The patient's clinical indicators include:  Clinical Information: 54y.o. M presents to ED with flu-like symptoms and hyperglycemia. Per H/P, admitted with Pneumonia, Hyperglycemia due to DM2 and Yeast infection of the skin.  VS: 39.1, 133, 23, 171/62 Map 98, 93 percent on RA     ED Provider Note: \" Overall impression is sepsis pneumonia. \"     H/P: \" Pneumonia. X-ray with bilateral opacities concerning for pneumonia. Elevated WBC, fever, IV antibiotics \"     ID Consult: \" GNR septicemia. Patient is admitted with primarily constitutional symptoms and has GNR septicemia and pyuria. \"     Nephrology Consult: \" Acute kidney injury, he may have acute tubular necrosis in the setting of bacteremia, sepsis and volume depletion. Bacteremia. Hypomagnesemia. Hypertension. Diabetes mellitus with significant hyperglycemia \"    Clinical Indicators:   Temp: 39.1, 39.3   HR: 133, 105, 91, 92, 95, 98, 101   RR: 23, 20, 23, 22, 21, 24, 22, 28, 23, 25   Temp: 39.5, 38.6, 39.2   HR: 113, 102, 107, 110, 116, " 121, 137, 144, 133  8/13 RR: 25, 34, 27, 24, 26, 21, 27, 31, 24, 36    8/12 Urine Culture: Enteric Bacilli  8/12 Blood Cultures x2: Gram negative Bacilli    8/13 SpO2 89 percent on RA - Calculated P/F ratio 269.3  8/13 SpO2 95 percent on 2L NC - Calculated P/F ratio 270.2    8/12 H/H 13.3/39.7, WBC 17.4, Plts 165, Neutrophils 15.71  8/13 H/H 11.7/34.7, WBC 11.9, Plts 147  8/14 H/H 11.7/36.0, WBC 11.0, Plts 151, Neutrophils 8.55    8/12 VBG: pH 7.49, pCO2 30, pO2 56, Lactate 2.4, HCO3 22.9  8/12 Creatinine 1.50, GFR 54, BUN 29, Bicarb 20, Na 123, K 4.0, Mg 1.30, Glucose 546  8/13 Creatinine 2.00, GFR 38, BUN 36, Bicarb 21, Na 124, K 3.6, Mg 1.50, Glucose 427  8/14 Creatinine 2.30, GFR 33, BUN 43, Bicarb 22, Na 126, K 3.2, Mg 2.10, Glucose 257    Treatment:  8/12 Zosyn 3.375gm IV x1 dose  8/12 Azithromycin 500mg IV x1 dose  8/12 Ceftriaxone 1gm IV x1 dose  8/13-8/14 Zosyn 4.5gm IV x4 doses  8/12-8/14 NS x4.5L    Risk Factors: PMHx: HTN, Anxiety, DM2, KARINA, Chronic cough, Congenitally blind R eye, Morbid obesity.  BMI: 55.59  Options provided:  -- Gram-negative Sepsis 2/2 to UTI and Pneumonia with renal organ dysfunction of SEAN  -- Gram-negative Sepsis 2/2 to UTI and Pneumonia with renal organ dysfunction of SEAN with ATN  -- Gram-negative Sepsis 2/2 to UTI and Pneumonia with respiratory organ dysfunction of Acute hypoxic respiratory failure as evident by patient requiring 2L O2  -- Other - I will add my own diagnosis  -- Refer to Clinical Documentation Reviewer    Query created by: Naa Rayo on 8/14/2024 9:29 AM      Electronically signed by:  NAA HAY APRN-CNP 8/14/2024 9:36 AM

## 2024-08-14 NOTE — ASSESSMENT & PLAN NOTE
Blood cultures x 2 positive for gram neg bacilli   Consult ID - appreciate recs   IV antibiotics per ID recommendations   Temp 39.1 overnight

## 2024-08-14 NOTE — PROGRESS NOTES
"Wilber Pennington \"Wong\" is a 54 y.o. male on day 2 of admission presenting with Acute cystitis without hematuria.      Subjective   Patient is feeling better today, his creatinine is slightly up to 2.3.  He reports mild diarrhea, occasional short lasting shortness of breath otherwise no complaints.       Objective          Vitals 24HR  Heart Rate:  []   Temp:  [36.7 °C (98.1 °F)-39 °C (102.2 °F)]   Resp:  [15-36]   BP: (102-133)/(56-74)   Weight:  [176 kg (387 lb 6.4 oz)]   SpO2:  [89 %-96 %]       Intake/Output last 3 Shifts:    Intake/Output Summary (Last 24 hours) at 8/14/2024 1140  Last data filed at 8/14/2024 0700  Gross per 24 hour   Intake 425 ml   Output 425 ml   Net 0 ml       Physical Exam  Constitutional:       General: He is awake. He is not in acute distress.  Cardiovascular:      Heart sounds:      No friction rub.   Pulmonary:      Effort: Pulmonary effort is normal.      Breath sounds: Normal breath sounds.   Abdominal:      General: Bowel sounds are normal.      Palpations: Abdomen is soft.      Tenderness: There is no guarding or rebound.   Musculoskeletal:      Comments: Mild peripheral edema   Neurological:      Mental Status: He is alert.         Relevant Results  Results for orders placed or performed during the hospital encounter of 08/12/24 (from the past 24 hour(s))   POCT GLUCOSE   Result Value Ref Range    POCT Glucose 379 (H) 74 - 99 mg/dL   Magnesium, Urine Random   Result Value Ref Range    Magnesium, Urine Random 1.17 mg/dL    Creatinine, Urine Random 212.5 20.0 - 370.0 mg/dL    Magnesium/Creatinine Ratio 5.5 Not established. mg/g Creat   POCT GLUCOSE   Result Value Ref Range    POCT Glucose 377 (H) 74 - 99 mg/dL   POCT GLUCOSE   Result Value Ref Range    POCT Glucose 360 (H) 74 - 99 mg/dL   POCT GLUCOSE   Result Value Ref Range    POCT Glucose 270 (H) 74 - 99 mg/dL   Basic Metabolic Panel   Result Value Ref Range    Glucose 257 (H) 65 - 99 mg/dL    Sodium 126 (L) 133 - 145 mmol/L "    Potassium 3.2 (L) 3.4 - 5.1 mmol/L    Chloride 91 (L) 97 - 107 mmol/L    Bicarbonate 22 (L) 24 - 31 mmol/L    Urea Nitrogen 43 (H) 8 - 25 mg/dL    Creatinine 2.30 (H) 0.40 - 1.60 mg/dL    eGFR 33 (L) >60 mL/min/1.73m*2    Calcium 8.4 (L) 8.5 - 10.4 mg/dL    Anion Gap 13 <=19 mmol/L   CBC and Auto Differential   Result Value Ref Range    WBC 11.0 4.4 - 11.3 x10*3/uL    nRBC 0.0 0.0 - 0.0 /100 WBCs    RBC 4.36 (L) 4.50 - 5.90 x10*6/uL    Hemoglobin 11.7 (L) 13.5 - 17.5 g/dL    Hematocrit 36.0 (L) 41.0 - 52.0 %    MCV 83 80 - 100 fL    MCH 26.8 26.0 - 34.0 pg    MCHC 32.5 32.0 - 36.0 g/dL    RDW 14.1 11.5 - 14.5 %    Platelets 151 150 - 450 x10*3/uL    Neutrophils % 78.0 40.0 - 80.0 %    Immature Granulocytes %, Automated 0.8 0.0 - 0.9 %    Lymphocytes % 8.1 13.0 - 44.0 %    Monocytes % 11.1 2.0 - 10.0 %    Eosinophils % 1.6 0.0 - 6.0 %    Basophils % 0.4 0.0 - 2.0 %    Neutrophils Absolute 8.55 (H) 1.20 - 7.70 x10*3/uL    Immature Granulocytes Absolute, Automated 0.09 0.00 - 0.70 x10*3/uL    Lymphocytes Absolute 0.89 (L) 1.20 - 4.80 x10*3/uL    Monocytes Absolute 1.22 (H) 0.10 - 1.00 x10*3/uL    Eosinophils Absolute 0.18 0.00 - 0.70 x10*3/uL    Basophils Absolute 0.04 0.00 - 0.10 x10*3/uL   Magnesium   Result Value Ref Range    Magnesium 2.10 1.60 - 3.10 mg/dL   POCT GLUCOSE   Result Value Ref Range    POCT Glucose 278 (H) 74 - 99 mg/dL            Assessment/Plan   Acute kidney injury, likely acute tubular necrosis in the setting of bacteremia, sepsis and volume depletion  Bacteremia  Urinary tract infection  Hypomagnesemia, resolved  Hypertension  Diabetes mellitus with significant hyperglycemia     Plan: Continue IV fluids decreased to 75 cc an hour.  Avoid NSAIDs.  Hold ACE inhibitor.  Avoid metformin.  Monitor culture results, renally dose antibiotics, infectious disease on board.  Pending urine magnesium and urine creatinine to workup hypomagnesemia.  Diabetes management per the primary team.  Potassium  replaced.  Discussed with primary team recommended switching off of Lovenox to heparin.    Monserrat Byers MD

## 2024-08-14 NOTE — ASSESSMENT & PLAN NOTE
Baseline creatinine ~ 0.9   Continuing to rise    Nephrology following - appreciate recs    Abdomen/pelvis CT shows renal calculi with no hydronephrosis or stranding    Hold lisinopril/metformin    Monitor daily labs

## 2024-08-14 NOTE — ASSESSMENT & PLAN NOTE
Hold home metformin   Glucose AC/HS with SSI coverage  - change to AC/HS today   Lantus 40 units nightly (takes Tresiba 70 units daily at home)   Hypoglycemia protocol

## 2024-08-14 NOTE — ASSESSMENT & PLAN NOTE
Continue to hold home lisinopril, amlodipine in the setting of sepsis, hypotension   Continue beta blocker at lower dose,

## 2024-08-14 NOTE — CARE PLAN
Problem: Bathing  Goal: STG - Patient will bathe lower body independent with AE as needed  Outcome: Progressing     Problem: Dressings Lower Extremities  Goal: LTG - Patient will dress lower body independent with AE as needed  Outcome: Progressing     Problem: Toileting  Goal: STG - Patient will complete toileting tasks with 2ww  independent  Outcome: Progressing   The patient's goals for the shift include lower blood sugar; no falls    The clinical goals for the shift include lower blood sugar, VSS; free from falls/injuries

## 2024-08-14 NOTE — PROGRESS NOTES
INFECTIOUS DISEASES PROGRESS NOTE    Consulted / following patient for:  E. coli septicemia    Subjective   Interval History:   Feels significantly better, mental clarity and energy returning toward baseline.  Spouse at the bedside agrees  Notes a painful swelling in the left side of the perineum adjacent to the scrotum     Objective   PHYSICAL EXAMINATION  Vital signs:  Visit Vitals  /58 (BP Location: Left arm, Patient Position: Lying)   Pulse 72   Temp 36.5 °C (97.7 °F) (Temporal)   Resp 20      General: Nontoxic, no acute distress  HEENT:  No scleral icterus or conjunctival suffusion, oral mucosa moist  Nodes:  Negative  Lungs:  Clear to auscultation  Heart:  S1, S2   Abdomen:  Soft, obese, nontender.  Back:  No spinal or CVA tenderness  Genitalia: Buried phallus.  Erythema and some edema of the scrotum but no tenderness, suppuration, or cellulitis appreciated.  There is a area of mild tenderness in nodular swelling and induration in the left side of the perineum adjacent to the edge of the scrotum.  No suppuration or crepitus  Extremities:  No cords, phlebitis, cellulitis  Neurologic:  Alert.  Grossly non-focal.    Skin: No dermatitis or cellulitis    Relevant Results  WBC: 17,400 --> 11,900 ---> 11,000  Creatinine: (Baseline 0.9) 1.5 ---> 2.0 ---> 2.3    Results from last 72 hours   Lab Units 08/12/24  1443   AST U/L 18   ALT U/L 19   ALK PHOS U/L 96   BILIRUBIN TOTAL mg/dL 1.0     Microbiology:  Blood: E. coli, resistant to ampicillin, Unasyn, cefazolin, TMP-SMX.  Susceptible to ceftriaxone  Urine: High colony count enteric GNR    Imaging:  CXR images personally reviewed:   Large right intrarenal calculi including 1.5 cm calculus  within the right renal pelvis. No hydronephrosis. No evidence of  acute bowel pathology. Hepatomegaly.      ASSESSMENT:  E. coli septicemia  Awaiting the identification of the organism in his urine, but this is almost certainly E. coli sepsis of urinary origin.  There is  nephrolithiasis without obstruction.  Uncertain of the significance of the perineal lesion described above.  Possibly a small abscess?  Is responding very well to antimicrobial therapy    Acute kidney injury  Appreciate Dr. Byers's comments and orders    Obesity/diabetes    PLANS:  -   Change antibiotic therapy to ceftriaxone  -   Await urine GNR identification and susceptibility  -   Consult Dr. Julien Moody (I have sent a text message)  -   Hope to transition to oral antimicrobial therapy in the next several days    Discussed in detail with spouse at bedside    Nathanael Villa MD  [facility]  Office:  [phone]

## 2024-08-14 NOTE — CARE PLAN
Problem: Bathing  Goal: STG - Patient will bathe lower body independent with AE as needed  Outcome: Progressing     Problem: Dressings Lower Extremities  Goal: LTG - Patient will dress lower body independent with AE as needed  Outcome: Progressing     Problem: Toileting  Goal: STG - Patient will complete toileting tasks with 2ww  independent  Outcome: Progressing     Problem: Pain - Adult  Goal: Verbalizes/displays adequate comfort level or baseline comfort level  Outcome: Progressing     Problem: Safety - Adult  Goal: Free from fall injury  Outcome: Progressing     Problem: Discharge Planning  Goal: Discharge to home or other facility with appropriate resources  Outcome: Progressing     Problem: Chronic Conditions and Co-morbidities  Goal: Patient's chronic conditions and co-morbidity symptoms are monitored and maintained or improved  Outcome: Progressing     Problem: Diabetes  Goal: Achieve decreasing blood glucose levels by end of shift  Outcome: Progressing  Goal: Increase stability of blood glucose readings by end of shift  Outcome: Progressing  Goal: Decrease in ketones present in urine by end of shift  Outcome: Progressing  Goal: Maintain electrolyte levels within acceptable range throughout shift  Outcome: Progressing  Goal: Maintain glucose levels >70mg/dl to <250mg/dl throughout shift  Outcome: Progressing  Goal: No changes in neurological exam by end of shift  Outcome: Progressing  Goal: Learn about and adhere to nutrition recommendations by end of shift  Outcome: Progressing  Goal: Vital signs within normal range for age by end of shift  Outcome: Progressing  Goal: Increase self care and/or family involovement by end of shift  Outcome: Progressing  Goal: Receive DSME education by end of shift  Outcome: Progressing   The patient's goals for the shift include lower blood sugar; no falls    The clinical goals for the shift include lower blood sugar, VSS; free from falls/injuries

## 2024-08-14 NOTE — PROGRESS NOTES
"Wilber Pennington \"Gabby" is a 54 y.o. male on day 2 of admission presenting with Pneumonia.    Subjective   Patient sitting in chair at bedside. States he is feeling better today. Denies chest pain, shortness of breath, abdominal pain. States he has episodes of extreme diaphoresis, but denies chills.        Objective     Physical Exam  Constitutional:       Appearance: Normal appearance.   HENT:      Head: Normocephalic and atraumatic.      Mouth/Throat:      Mouth: Mucous membranes are moist.      Pharynx: Oropharynx is clear.   Eyes:      Extraocular Movements: Extraocular movements intact.      Conjunctiva/sclera: Conjunctivae normal.      Pupils: Pupils are equal, round, and reactive to light.   Cardiovascular:      Rate and Rhythm: Normal rate and regular rhythm.      Pulses: Normal pulses.      Heart sounds: Normal heart sounds.   Pulmonary:      Effort: Pulmonary effort is normal.      Breath sounds: Normal breath sounds.   Abdominal:      General: Bowel sounds are normal.      Palpations: Abdomen is soft.      Tenderness: There is no abdominal tenderness.   Musculoskeletal:         General: Normal range of motion.      Cervical back: Normal range of motion and neck supple.   Skin:     General: Skin is warm and dry.      Capillary Refill: Capillary refill takes less than 2 seconds.   Neurological:      General: No focal deficit present.      Mental Status: He is alert and oriented to person, place, and time.   Psychiatric:         Mood and Affect: Mood normal.         Behavior: Behavior normal.         Last Recorded Vitals  Blood pressure 131/74, pulse 96, temperature 37.3 °C (99.1 °F), temperature source Temporal, resp. rate 22, height 1.778 m (5' 10\"), weight (!) 176 kg (387 lb 6.4 oz), SpO2 94%.  Intake/Output last 3 Shifts:  I/O last 3 completed shifts:  In: 500 (2.8 mL/kg) [I.V.:200 (1.1 mL/kg); IV Piggyback:300]  Out: 300 (1.7 mL/kg) [Urine:300 (0 mL/kg/hr)]  Weight: 175.7 kg     Relevant Results  Results " for orders placed or performed during the hospital encounter of 08/12/24 (from the past 24 hour(s))   Serial Troponin, 6 Hour ([facility])   Result Value Ref Range    Troponin T, High Sensitivity 27 (HH) <=14 ng/L   POCT GLUCOSE   Result Value Ref Range    POCT Glucose 379 (H) 74 - 99 mg/dL   Magnesium, Urine Random   Result Value Ref Range    Magnesium, Urine Random 1.17 mg/dL    Creatinine, Urine Random 212.5 20.0 - 370.0 mg/dL    Magnesium/Creatinine Ratio 5.5 Not established. mg/g Creat   POCT GLUCOSE   Result Value Ref Range    POCT Glucose 377 (H) 74 - 99 mg/dL   POCT GLUCOSE   Result Value Ref Range    POCT Glucose 360 (H) 74 - 99 mg/dL   POCT GLUCOSE   Result Value Ref Range    POCT Glucose 270 (H) 74 - 99 mg/dL   Basic Metabolic Panel   Result Value Ref Range    Glucose 257 (H) 65 - 99 mg/dL    Sodium 126 (L) 133 - 145 mmol/L    Potassium 3.2 (L) 3.4 - 5.1 mmol/L    Chloride 91 (L) 97 - 107 mmol/L    Bicarbonate 22 (L) 24 - 31 mmol/L    Urea Nitrogen 43 (H) 8 - 25 mg/dL    Creatinine 2.30 (H) 0.40 - 1.60 mg/dL    eGFR 33 (L) >60 mL/min/1.73m*2    Calcium 8.4 (L) 8.5 - 10.4 mg/dL    Anion Gap 13 <=19 mmol/L   CBC and Auto Differential   Result Value Ref Range    WBC 11.0 4.4 - 11.3 x10*3/uL    nRBC 0.0 0.0 - 0.0 /100 WBCs    RBC 4.36 (L) 4.50 - 5.90 x10*6/uL    Hemoglobin 11.7 (L) 13.5 - 17.5 g/dL    Hematocrit 36.0 (L) 41.0 - 52.0 %    MCV 83 80 - 100 fL    MCH 26.8 26.0 - 34.0 pg    MCHC 32.5 32.0 - 36.0 g/dL    RDW 14.1 11.5 - 14.5 %    Platelets 151 150 - 450 x10*3/uL    Neutrophils % 78.0 40.0 - 80.0 %    Immature Granulocytes %, Automated 0.8 0.0 - 0.9 %    Lymphocytes % 8.1 13.0 - 44.0 %    Monocytes % 11.1 2.0 - 10.0 %    Eosinophils % 1.6 0.0 - 6.0 %    Basophils % 0.4 0.0 - 2.0 %    Neutrophils Absolute 8.55 (H) 1.20 - 7.70 x10*3/uL    Immature Granulocytes Absolute, Automated 0.09 0.00 - 0.70 x10*3/uL    Lymphocytes Absolute 0.89 (L) 1.20 - 4.80 x10*3/uL    Monocytes Absolute 1.22 (H) 0.10 - 1.00  x10*3/uL    Eosinophils Absolute 0.18 0.00 - 0.70 x10*3/uL    Basophils Absolute 0.04 0.00 - 0.10 x10*3/uL   Magnesium   Result Value Ref Range    Magnesium 2.10 1.60 - 3.10 mg/dL   POCT GLUCOSE   Result Value Ref Range    POCT Glucose 278 (H) 74 - 99 mg/dL     CT abdomen pelvis wo IV contrast    Result Date: 8/13/2024  Interpreted By:  Lalo Santana, STUDY: CT ABDOMEN PELVIS WO IV CONTRAST;  8/13/2024 11:52 am   INDICATION: Signs/Symptoms:Gram negative septicemia.   COMPARISON: None   ACCESSION NUMBER(S): [id number]   ORDERING CLINICIAN: PIPER BADILLO   TECHNIQUE: CT of the abdomen and pelvis was performed. Contiguous axial images were obtained through the abdomen and pelvis. Coronal and sagittal reconstructions at 3 mm slice thickness were performed.  No intravenous contrast was administered.   FINDINGS: Please note that the evaluation of vessels, lymph nodes and organs is limited without intravenous contrast.   LOWER CHEST: There is no acute abnormality in the lower chest. The heart is normal in size without evidence of pericardial effusion. No pleural effusion is present.   ABDOMEN:   LIVER: The liver is markedly enlarged but normal in contour.   BILE DUCTS: The intrahepatic and extrahepatic ducts are not dilated.   GALLBLADDER: The gallbladder is nondistended and without evidence of radiopaque stones.   PANCREAS: The pancreas is not enlarged.   SPLEEN: Within normal limits.   ADRENAL GLANDS: Within normal limits.   KIDNEYS AND URETERS: There are multiple large right intrarenal calculi including 1.5 cm calculus within the right renal pelvis. No hydronephrosis or perinephric edema.   PELVIS:   BLADDER: The urinary bladder is incompletely distended, limiting assessment.   REPRODUCTIVE ORGANS: No pelvic masses.   BOWEL: The stomach is incompletely distended, limiting evaluation for focal wall thickening. There is no bowel wall dilatation or obstruction. The appendix is normal. Large bowel is mostly collapsed  without evidence of bowel wall thickening or acute inflammatory change..   VESSELS: The abdominal aorta is normal in caliber. No significant atherosclerotic calcifications.   PERITONEUM/RETROPERITONEUM/LYMPH NODES: There is no ascites or intraperitoneal free air. There is no lymphadenopathy by CT criteria.   ABDOMINAL WALL: There is a fat containing ventral abdominal wall hernia.   BONES: No suspicious osseous lesions are identified. Degenerative discogenic disease is noted predominantly in the lower lumbar spine.       1.  Note that evaluation of solid organs is somewhat limited due to absence of intravenous contrast and due to photon starvation artifact. Large right intrarenal calculi including 1.5 cm calculus within the right renal pelvis. No hydronephrosis. No evidence of acute bowel pathology. Hepatomegaly.     Signed by: Lalo Santana 8/13/2024 12:32 PM Dictation workstation:   [id number]    ECG 12 lead    Result Date: 8/13/2024  Sinus tachycardia Left anterior fascicular block Abnormal ECG    XR chest 1 view    Result Date: 8/12/2024  Interpreted By:  Soledad Post, STUDY: XR CHEST 1 VIEW; ;  8/12/2024 3:10 pm   INDICATION: Signs/Symptoms:fever, sob.   COMPARISON: None.   ACCESSION NUMBER(S): [id number]   ORDERING CLINICIAN: LINO MONTE   FINDINGS: Cardiac silhouette is mildly enlarged. There is prominence of interstitial markings in bilateral lungs with perihilar vascular prominence. There are ill-defined reticular airspace opacities in bilateral mid to lower lung zones. No large pleural effusions or pneumothorax within limits of portable technique. No acute osseous findings.       1. Mild cardiomegaly with suggestion of mild interstitial edema. Recommend correlation with fluid status and cardiac function. 2. Suggestion of ill-defined reticular opacities in bilateral mid to lower lung zones. While these could be related to interstitial edema, pneumonia secondary to atypical infectious etiology can be  considered in the differential. Recommend clinical and laboratory correlation.       MACRO: None   Signed by: Soledad Post 8/12/2024 3:39 PM Dictation workstation:   [id number]         Assessment/Plan   Assessment & Plan  Acute cystitis without hematuria  IV antibiotics   ID following - appreciate recs     Bacteremia  Blood cultures x 2 positive for gram neg bacilli   Consult ID - appreciate recs   IV antibiotics per ID recommendations   Temp 39.1 overnight   Hyperglycemia due to diabetes mellitus (Multi)  See diabetes plan   Carb controlled diet   Improving with treatment of infection and IV fluids   Hypertension  Continue to hold home lisinopril, amlodipine in the setting of sepsis, hypotension   Continue beta blocker at lower dose,   Type 2 diabetes mellitus (Multi)  Hold home metformin   Glucose AC/HS with SSI coverage  - change to AC/HS today   Lantus 40 units nightly (takes Tresiba 70 units daily at home)   Hypoglycemia protocol   Yeast infection of the skin  Continue Nystatin powder and Probiotic   KARINA (obstructive sleep apnea)  Using home CPAP machine   Anxiety  Continue Buspar   SEAN (acute kidney injury) (CMS-[facility])   Baseline creatinine ~ 0.9   Continuing to rise    Nephrology following - appreciate recs    Abdomen/pelvis CT shows renal calculi with no hydronephrosis or stranding    Hold lisinopril/metformin    Monitor daily labs     DVT prophylaxis   - Lovenox      Aleena Novak, APRN-CNP

## 2024-08-14 NOTE — PROGRESS NOTES
"Physical Therapy    Physical Therapy Treatment    Patient Name: Wilber Pennington \"Wong\"  MRN: [id number]  Today's Date: 8/14/2024  Time Calculation  Start Time: 1404  Stop Time: 1434  Time Calculation (min): 30 min    Assessment/Plan   PT Assessment  Rehab Prognosis: Good  Evaluation/Treatment Tolerance: Patient limited by fatigue  End of Session Communication: Bedside nurse  Assessment Comment: Improving functional mobility;  improving tolerance to activity  End of Session Patient Position: Up in chair, Alarm off, not on at start of session  PT Plan  Inpatient/Swing Bed or Outpatient: Inpatient  PT Plan  Treatment/Interventions: Bed mobility, Transfer training, Gait training, Stair training, Balance training, Strengthening, Endurance training, Therapeutic exercise, Therapeutic activity  PT Plan: Ongoing PT  PT Frequency: 4 times per week  PT Discharge Recommendations: Low intensity level of continued care  PT Recommended Transfer Status: Stand by assist  PT - OK to Discharge: Yes      General Visit Information:   PT  Visit  PT Received On: 08/14/24  General  Prior to Session Communication: Bedside nurse  Patient Position Received: Up in chair, Alarm off, not on at start of session  General Comment: RN cleared patient for treatment.  Patient reports agreeable to treatment.    Subjective   Precautions:  Precautions  Medical Precautions: Fall precautions  O2 at 3 liters via nasal cannula      Objective   Pain:  Pain Assessment  Pain Assessment: 0-10  0-10 (Numeric) Pain Score: 0 - No pain  Cognition:  Cognition  Overall Cognitive Status: Within Functional Limits  Coordination:  Movements are Fluid and Coordinated: No  Lower Body Coordination: slower rate of movement cindy LE  Postural Control:  Static Sitting Balance  Static Sitting-Balance Support: No upper extremity supported  Static Sitting-Level of Assistance: Independent  Static Standing Balance  Static Standing-Balance Support: Bilateral upper extremity " supported  Static Standing-Level of Assistance: Close supervision  Static Standing-Comment/Number of Minutes: Supervision with balance during static standing with rolling walker       Activity Tolerance:  Activity Tolerance  Endurance: Decreased tolerance for upright activites  Activity Tolerance Comments: Fatigue;  mild SOB resolved with rest  Treatments:  Therapeutic Exercise  Therapeutic Exercise Performed: Yes  Therapeutic Exercise Activity 1: ankle pumps, sitting knee extension 5-10 reps x 1 set    Therapeutic Activity  Therapeutic Activity Performed: Yes  Therapeutic Activity 1: Static standing x ~4 minutes with no assistive device and supervision with balance         Bed Mobility  Bed Mobility: No    Ambulation/Gait Training  Ambulation/Gait Training Performed: Yes  Ambulation/Gait Training 1  Surface 1: Level tile  Device 1: Rolling walker  Assistance 1: Close supervision  Comments/Distance (ft) 1: 20 feet x 1, 30 feet x 1, 50 feet x 1 with rolling walker and supervision with balance;  patient ambulates with slow kayla, reciprocating gait pattern, decreased step length cindy LE, and mild forward flexed posture.  Transfers  Transfer: Yes  Transfer 1  Transfer From 1: Sit to  Transfer to 1: Stand  Technique 1: Sit to stand  Transfer Device 1: Walker  Transfer Level of Assistance 1: Close supervision  Trials/Comments 1: x 2 reps;  supervision with balance  Transfers 2  Transfer From 2: Stand to  Transfer to 2: Sit  Technique 2: Stand to sit  Transfer Device 2: Walker  Transfer Level of Assistance 2: Close supervision  Trials/Comments 2: x 2 reps;  supervision with balance;  verbal cues for hand placement    Stairs  Stairs: No         Outcome Measures:  [facility] Basic Mobility  Turning from your back to your side while in a flat bed without using bedrails: A little  Moving from lying on your back to sitting on the side of a flat bed without using bedrails: A little  Moving to and from bed to chair (including a  wheelchair): A little  Standing up from a chair using your arms (e.g. wheelchair or bedside chair): A little  To walk in hospital room: A little  Climbing 3-5 steps with railing: A lot  Basic Mobility - Total Score: 17    Education Documentation  No documentation found.  Education Comments  No comments found.        OP EDUCATION:       Encounter Problems       Encounter Problems (Active)       Mobility       Bed mobility including supine to sit and sit to supine independently. (Progressing)       Start:  08/13/24    Expected End:  08/27/24            Ambulate 100 feet +/- rolling walker independently. (Progressing)       Start:  08/13/24    Expected End:  08/27/24            Negotiate 3 steps with single handrail and min assist.       Start:  08/13/24    Expected End:  08/27/24               PT Transfers       Transfers including sit to stand and stand to sit independently. (Progressing)       Start:  08/13/24    Expected End:  08/27/24               Pain - Adult

## 2024-08-15 LAB
ALBUMIN SERPL-MCNC: 2.7 G/DL (ref 3.5–5)
ANION GAP SERPL CALC-SCNC: 13 MMOL/L
ATRIAL RATE: 134 BPM
BACTERIA UR CULT: ABNORMAL
BACTERIA UR CULT: ABNORMAL
BUN SERPL-MCNC: 42 MG/DL (ref 8–25)
CALCIUM SERPL-MCNC: 8.6 MG/DL (ref 8.5–10.4)
CHLORIDE SERPL-SCNC: 95 MMOL/L (ref 97–107)
CO2 SERPL-SCNC: 22 MMOL/L (ref 24–31)
CREAT SERPL-MCNC: 1.9 MG/DL (ref 0.4–1.6)
EGFRCR SERPLBLD CKD-EPI 2021: 41 ML/MIN/1.73M*2
ERYTHROCYTE [DISTWIDTH] IN BLOOD BY AUTOMATED COUNT: 14.3 % (ref 11.5–14.5)
GLUCOSE BLD MANUAL STRIP-MCNC: 197 MG/DL (ref 74–99)
GLUCOSE BLD MANUAL STRIP-MCNC: 249 MG/DL (ref 74–99)
GLUCOSE BLD MANUAL STRIP-MCNC: 256 MG/DL (ref 74–99)
GLUCOSE BLD MANUAL STRIP-MCNC: 296 MG/DL (ref 74–99)
GLUCOSE SERPL-MCNC: 204 MG/DL (ref 65–99)
HCT VFR BLD AUTO: 34.5 % (ref 41–52)
HGB BLD-MCNC: 11.3 G/DL (ref 13.5–17.5)
MCH RBC QN AUTO: 27 PG (ref 26–34)
MCHC RBC AUTO-ENTMCNC: 32.8 G/DL (ref 32–36)
MCV RBC AUTO: 83 FL (ref 80–100)
NRBC BLD-RTO: 0 /100 WBCS (ref 0–0)
P AXIS: 54 DEGREES
P OFFSET: 186 MS
P ONSET: 136 MS
PHOSPHATE SERPL-MCNC: 2.5 MG/DL (ref 2.5–4.5)
PLATELET # BLD AUTO: 166 X10*3/UL (ref 150–450)
POTASSIUM SERPL-SCNC: 3.6 MMOL/L (ref 3.4–5.1)
PR INTERVAL: 154 MS
Q ONSET: 213 MS
QRS COUNT: 22 BEATS
QRS DURATION: 92 MS
QT INTERVAL: 288 MS
QTC CALCULATION(BAZETT): 430 MS
QTC FREDERICIA: 376 MS
R AXIS: -46 DEGREES
RBC # BLD AUTO: 4.18 X10*6/UL (ref 4.5–5.9)
SODIUM SERPL-SCNC: 130 MMOL/L (ref 133–145)
T AXIS: 66 DEGREES
T OFFSET: 357 MS
VENTRICULAR RATE: 134 BPM
WBC # BLD AUTO: 10.7 X10*3/UL (ref 4.4–11.3)

## 2024-08-15 PROCEDURE — 2500000002 HC RX 250 W HCPCS SELF ADMINISTERED DRUGS (ALT 637 FOR MEDICARE OP, ALT 636 FOR OP/ED): Performed by: NURSE PRACTITIONER

## 2024-08-15 PROCEDURE — 1100000001 HC PRIVATE ROOM DAILY

## 2024-08-15 PROCEDURE — 36415 COLL VENOUS BLD VENIPUNCTURE: CPT | Performed by: INTERNAL MEDICINE

## 2024-08-15 PROCEDURE — 2500000004 HC RX 250 GENERAL PHARMACY W/ HCPCS (ALT 636 FOR OP/ED): Performed by: NURSE PRACTITIONER

## 2024-08-15 PROCEDURE — 97535 SELF CARE MNGMENT TRAINING: CPT | Mod: GO,CO

## 2024-08-15 PROCEDURE — 85027 COMPLETE CBC AUTOMATED: CPT | Performed by: INTERNAL MEDICINE

## 2024-08-15 PROCEDURE — 2500000001 HC RX 250 WO HCPCS SELF ADMINISTERED DRUGS (ALT 637 FOR MEDICARE OP): Performed by: NURSE PRACTITIONER

## 2024-08-15 PROCEDURE — 82947 ASSAY GLUCOSE BLOOD QUANT: CPT

## 2024-08-15 PROCEDURE — 80069 RENAL FUNCTION PANEL: CPT | Performed by: INTERNAL MEDICINE

## 2024-08-15 PROCEDURE — 94660 CPAP INITIATION&MGMT: CPT

## 2024-08-15 PROCEDURE — 2500000004 HC RX 250 GENERAL PHARMACY W/ HCPCS (ALT 636 FOR OP/ED): Performed by: INTERNAL MEDICINE

## 2024-08-15 PROCEDURE — 9420000001 HC RT PATIENT EDUCATION 5 MIN

## 2024-08-15 RX ORDER — FLUTICASONE PROPIONATE 50 MCG
1 SPRAY, SUSPENSION (ML) NASAL NIGHTLY
Status: DISCONTINUED | OUTPATIENT
Start: 2024-08-15 | End: 2024-08-16 | Stop reason: HOSPADM

## 2024-08-15 RX ORDER — GUAIFENESIN 600 MG/1
600 TABLET, EXTENDED RELEASE ORAL 2 TIMES DAILY
Status: DISCONTINUED | OUTPATIENT
Start: 2024-08-15 | End: 2024-08-16 | Stop reason: HOSPADM

## 2024-08-15 ASSESSMENT — PAIN - FUNCTIONAL ASSESSMENT
PAIN_FUNCTIONAL_ASSESSMENT: 0-10

## 2024-08-15 ASSESSMENT — COGNITIVE AND FUNCTIONAL STATUS - GENERAL
MOVING TO AND FROM BED TO CHAIR: A LITTLE
DAILY ACTIVITIY SCORE: 24
TOILETING: A LITTLE
DRESSING REGULAR LOWER BODY CLOTHING: A LITTLE
STANDING UP FROM CHAIR USING ARMS: A LITTLE
HELP NEEDED FOR BATHING: A LITTLE
MOBILITY SCORE: 24
TURNING FROM BACK TO SIDE WHILE IN FLAT BAD: A LITTLE
DRESSING REGULAR UPPER BODY CLOTHING: A LITTLE
DAILY ACTIVITIY SCORE: 20
DAILY ACTIVITIY SCORE: 23
DRESSING REGULAR LOWER BODY CLOTHING: A LITTLE
MOVING FROM LYING ON BACK TO SITTING ON SIDE OF FLAT BED WITH BEDRAILS: A LITTLE
MOBILITY SCORE: 17
CLIMB 3 TO 5 STEPS WITH RAILING: A LOT
WALKING IN HOSPITAL ROOM: A LITTLE

## 2024-08-15 ASSESSMENT — ACTIVITIES OF DAILY LIVING (ADL): HOME_MANAGEMENT_TIME_ENTRY: 39

## 2024-08-15 ASSESSMENT — PAIN SCALES - GENERAL
PAINLEVEL_OUTOF10: 0 - NO PAIN

## 2024-08-15 NOTE — PROGRESS NOTES
"Wilber Pennington \"Gabby" is a 54 y.o. male on day 3 of admission presenting with Acute cystitis without hematuria.      Subjective   Patient with no complaints other than some fatigue, his creatinine is improving.       Objective          Vitals 24HR  Heart Rate:  [72-96]   Temp:  [36.4 °C (97.5 °F)-38 °C (100.4 °F)]   Resp:  [16-20]   BP: (103-134)/(51-93)   Weight:  [176 kg (388 lb 0.2 oz)]   SpO2:  [94 %-97 %]       Intake/Output last 3 Shifts:    Intake/Output Summary (Last 24 hours) at 8/15/2024 1114  Last data filed at 8/15/2024 0716  Gross per 24 hour   Intake 2876.25 ml   Output 600 ml   Net 2276.25 ml       Physical Exam  Constitutional:       General: He is awake. He is not in acute distress.  Cardiovascular:      Heart sounds:      No friction rub.   Pulmonary:      Effort: Pulmonary effort is normal.      Breath sounds: Normal breath sounds.   Abdominal:      General: Bowel sounds are normal.      Palpations: Abdomen is soft.      Tenderness: There is no guarding or rebound.   Musculoskeletal:      Comments: Mild peripheral edema     Relevant Results  Results for orders placed or performed during the hospital encounter of 08/12/24 (from the past 24 hour(s))   POCT GLUCOSE   Result Value Ref Range    POCT Glucose 304 (H) 74 - 99 mg/dL   POCT GLUCOSE   Result Value Ref Range    POCT Glucose 252 (H) 74 - 99 mg/dL   POCT GLUCOSE   Result Value Ref Range    POCT Glucose 239 (H) 74 - 99 mg/dL   Renal Function Panel   Result Value Ref Range    Glucose 204 (H) 65 - 99 mg/dL    Sodium 130 (L) 133 - 145 mmol/L    Potassium 3.6 3.4 - 5.1 mmol/L    Chloride 95 (L) 97 - 107 mmol/L    Bicarbonate 22 (L) 24 - 31 mmol/L    Urea Nitrogen 42 (H) 8 - 25 mg/dL    Creatinine 1.90 (H) 0.40 - 1.60 mg/dL    eGFR 41 (L) >60 mL/min/1.73m*2    Calcium 8.6 8.5 - 10.4 mg/dL    Phosphorus 2.5 2.5 - 4.5 mg/dL    Albumin 2.7 (L) 3.5 - 5.0 g/dL    Anion Gap 13 <=19 mmol/L   CBC   Result Value Ref Range    WBC 10.7 4.4 - 11.3 x10*3/uL    " nRBC 0.0 0.0 - 0.0 /100 WBCs    RBC 4.18 (L) 4.50 - 5.90 x10*6/uL    Hemoglobin 11.3 (L) 13.5 - 17.5 g/dL    Hematocrit 34.5 (L) 41.0 - 52.0 %    MCV 83 80 - 100 fL    MCH 27.0 26.0 - 34.0 pg    MCHC 32.8 32.0 - 36.0 g/dL    RDW 14.3 11.5 - 14.5 %    Platelets 166 150 - 450 x10*3/uL   POCT GLUCOSE   Result Value Ref Range    POCT Glucose 197 (H) 74 - 99 mg/dL            Assessment/Plan   Acute kidney injury secondary to acute tubular necrosis in the setting of bacteremia, sepsis and volume depletion, improving  Bacteremia  Urinary tract infection  Hypomagnesemia, resolved  Hypertension  Diabetes mellitus with significant hyperglycemia     Plan: Will stop IV fluids and monitor.  Avoid NSAIDs.  Hold ACE inhibitor.  Avoid metformin.  Renally dose antibiotics, infectious disease on board.   Diabetes management per the primary team.  Potassium replaced.      Monserrat Byers MD

## 2024-08-15 NOTE — PROGRESS NOTES
"Wilber Pennington \"Gabby" is a 54 y.o. male on day 3 of admission presenting with Acute cystitis without hematuria.    Subjective   Patient sitting in chair at bedside. Continues to feel better, but didn't sleep well lasts night. Denies chest pain, shortness of breath, abdominal pain.        Objective     Physical Exam  Constitutional:       Appearance: Normal appearance.   HENT:      Head: Normocephalic and atraumatic.      Mouth/Throat:      Mouth: Mucous membranes are moist.      Pharynx: Oropharynx is clear.   Eyes:      Extraocular Movements: Extraocular movements intact.      Conjunctiva/sclera: Conjunctivae normal.      Pupils: Pupils are equal, round, and reactive to light.   Cardiovascular:      Rate and Rhythm: Normal rate and regular rhythm.      Pulses: Normal pulses.      Heart sounds: Normal heart sounds.   Pulmonary:      Effort: Pulmonary effort is normal.      Breath sounds: Normal breath sounds.   Abdominal:      General: Bowel sounds are normal.      Palpations: Abdomen is soft.      Tenderness: There is no abdominal tenderness.   Musculoskeletal:         General: Normal range of motion.      Cervical back: Normal range of motion and neck supple.   Skin:     General: Skin is warm and dry.      Capillary Refill: Capillary refill takes less than 2 seconds.   Neurological:      General: No focal deficit present.      Mental Status: He is alert and oriented to person, place, and time.   Psychiatric:         Mood and Affect: Mood normal.         Behavior: Behavior normal.         Last Recorded Vitals  Blood pressure 134/66, pulse 96, temperature 37 °C (98.6 °F), temperature source Temporal, resp. rate 17, height 1.778 m (5' 10\"), weight (!) 176 kg (388 lb 0.2 oz), SpO2 96%.  Intake/Output last 3 Shifts:  I/O last 3 completed shifts:  In: 3026.3 (17.2 mL/kg) [P.O.:675; I.V.:2001.3 (11.4 mL/kg); IV Piggyback:350]  Out: 775 (4.4 mL/kg) [Urine:775 (0.1 mL/kg/hr)]  Weight: 176 kg     Relevant Results  Results " for orders placed or performed during the hospital encounter of 08/12/24 (from the past 24 hour(s))   POCT GLUCOSE   Result Value Ref Range    POCT Glucose 304 (H) 74 - 99 mg/dL   POCT GLUCOSE   Result Value Ref Range    POCT Glucose 252 (H) 74 - 99 mg/dL   POCT GLUCOSE   Result Value Ref Range    POCT Glucose 239 (H) 74 - 99 mg/dL   Renal Function Panel   Result Value Ref Range    Glucose 204 (H) 65 - 99 mg/dL    Sodium 130 (L) 133 - 145 mmol/L    Potassium 3.6 3.4 - 5.1 mmol/L    Chloride 95 (L) 97 - 107 mmol/L    Bicarbonate 22 (L) 24 - 31 mmol/L    Urea Nitrogen 42 (H) 8 - 25 mg/dL    Creatinine 1.90 (H) 0.40 - 1.60 mg/dL    eGFR 41 (L) >60 mL/min/1.73m*2    Calcium 8.6 8.5 - 10.4 mg/dL    Phosphorus 2.5 2.5 - 4.5 mg/dL    Albumin 2.7 (L) 3.5 - 5.0 g/dL    Anion Gap 13 <=19 mmol/L   CBC   Result Value Ref Range    WBC 10.7 4.4 - 11.3 x10*3/uL    nRBC 0.0 0.0 - 0.0 /100 WBCs    RBC 4.18 (L) 4.50 - 5.90 x10*6/uL    Hemoglobin 11.3 (L) 13.5 - 17.5 g/dL    Hematocrit 34.5 (L) 41.0 - 52.0 %    MCV 83 80 - 100 fL    MCH 27.0 26.0 - 34.0 pg    MCHC 32.8 32.0 - 36.0 g/dL    RDW 14.3 11.5 - 14.5 %    Platelets 166 150 - 450 x10*3/uL   POCT GLUCOSE   Result Value Ref Range    POCT Glucose 197 (H) 74 - 99 mg/dL   POCT GLUCOSE   Result Value Ref Range    POCT Glucose 256 (H) 74 - 99 mg/dL     CT abdomen pelvis wo IV contrast    Result Date: 8/13/2024  Interpreted By:  Lalo Santana, STUDY: CT ABDOMEN PELVIS WO IV CONTRAST;  8/13/2024 11:52 am   INDICATION: Signs/Symptoms:Gram negative septicemia.   COMPARISON: None   ACCESSION NUMBER(S): [id number]   ORDERING CLINICIAN: PIPER BADILLO   TECHNIQUE: CT of the abdomen and pelvis was performed. Contiguous axial images were obtained through the abdomen and pelvis. Coronal and sagittal reconstructions at 3 mm slice thickness were performed.  No intravenous contrast was administered.   FINDINGS: Please note that the evaluation of vessels, lymph nodes and organs is limited without  intravenous contrast.   LOWER CHEST: There is no acute abnormality in the lower chest. The heart is normal in size without evidence of pericardial effusion. No pleural effusion is present.   ABDOMEN:   LIVER: The liver is markedly enlarged but normal in contour.   BILE DUCTS: The intrahepatic and extrahepatic ducts are not dilated.   GALLBLADDER: The gallbladder is nondistended and without evidence of radiopaque stones.   PANCREAS: The pancreas is not enlarged.   SPLEEN: Within normal limits.   ADRENAL GLANDS: Within normal limits.   KIDNEYS AND URETERS: There are multiple large right intrarenal calculi including 1.5 cm calculus within the right renal pelvis. No hydronephrosis or perinephric edema.   PELVIS:   BLADDER: The urinary bladder is incompletely distended, limiting assessment.   REPRODUCTIVE ORGANS: No pelvic masses.   BOWEL: The stomach is incompletely distended, limiting evaluation for focal wall thickening. There is no bowel wall dilatation or obstruction. The appendix is normal. Large bowel is mostly collapsed without evidence of bowel wall thickening or acute inflammatory change..   VESSELS: The abdominal aorta is normal in caliber. No significant atherosclerotic calcifications.   PERITONEUM/RETROPERITONEUM/LYMPH NODES: There is no ascites or intraperitoneal free air. There is no lymphadenopathy by CT criteria.   ABDOMINAL WALL: There is a fat containing ventral abdominal wall hernia.   BONES: No suspicious osseous lesions are identified. Degenerative discogenic disease is noted predominantly in the lower lumbar spine.       1.  Note that evaluation of solid organs is somewhat limited due to absence of intravenous contrast and due to photon starvation artifact. Large right intrarenal calculi including 1.5 cm calculus within the right renal pelvis. No hydronephrosis. No evidence of acute bowel pathology. Hepatomegaly.     Signed by: Lalo Santana 8/13/2024 12:32 PM Dictation workstation:   [id number]        Assessment/Plan   Assessment & Plan  Acute cystitis without hematuria  IV antibiotics   ID following - appreciate recs       Bacteremia  Blood cultures x 2 positive for gram neg bacilli   Consult ID - appreciate recs   IV antibiotics changed based on culture results   Temp 38 yesterday 1900   Hyperglycemia due to diabetes mellitus (Multi)  See diabetes plan   Carb controlled diet   Improving with treatment of infection and IV fluids   Hypertension  Continue to hold home lisinopril, amlodipine in the setting of sepsis, hypotension   Continue beta blocker at lower dose,   Type 2 diabetes mellitus (Multi)  Hold home metformin   Glucose AC/HS with SSI coverage  Lantus 50 units nightly (takes Tresiba 70 units daily at home)   Hypoglycemia protocol   Yeast infection of the skin  Continue Nystatin powder and Probiotic   Scrotal abscess - spontaneously draining   Urology following - appreciate recs.   KARINA (obstructive sleep apnea)  Using home CPAP machine   Anxiety  Continue Buspar   SEAN (acute kidney injury) (CMS-[facility])   Baseline creatinine ~ 0.9   Improving today    Nephrology following - appreciate recs    Abdomen/pelvis CT shows renal calculi with no hydronephrosis or stranding   - Urology consulted - appreciate recs    Hold lisinopril/metformin    Monitor daily labs     DVT prophylaxis    Heparin     Transfer to [facility] today.     Aleena Novak, APRN-CNP

## 2024-08-15 NOTE — CARE PLAN
The patient's goals for the shift include lower blood sugar; no falls    The clinical goals for the shift include comfort    O

## 2024-08-15 NOTE — ASSESSMENT & PLAN NOTE
Blood cultures x 2 positive for gram neg bacilli   Consult ID - appreciate recs   IV antibiotics changed based on culture results   Temp 38 yesterday 1900

## 2024-08-15 NOTE — PROGRESS NOTES
"Feels better    Blood pressure 113/66, pulse 80, temperature 36.5 °C (97.7 °F), temperature source Temporal, resp. rate 18, height 1.778 m (5' 10\"), weight (!) 176 kg (388 lb 0.2 oz), SpO2 97%.     Results for orders placed or performed during the hospital encounter of 08/12/24 (from the past 96 hour(s))   POCT GLUCOSE   Result Value Ref Range    POCT Glucose 521 (H) 74 - 99 mg/dL   CBC and Auto Differential   Result Value Ref Range    WBC 17.4 (H) 4.4 - 11.3 x10*3/uL    nRBC 0.0 0.0 - 0.0 /100 WBCs    RBC 4.87 4.50 - 5.90 x10*6/uL    Hemoglobin 13.3 (L) 13.5 - 17.5 g/dL    Hematocrit 39.7 (L) 41.0 - 52.0 %    MCV 82 80 - 100 fL    MCH 27.3 26.0 - 34.0 pg    MCHC 33.5 32.0 - 36.0 g/dL    RDW 14.0 11.5 - 14.5 %    Platelets 165 150 - 450 x10*3/uL    Neutrophils % 90.3 40.0 - 80.0 %    Immature Granulocytes %, Automated 0.5 0.0 - 0.9 %    Lymphocytes % 2.6 13.0 - 44.0 %    Monocytes % 6.3 2.0 - 10.0 %    Eosinophils % 0.0 0.0 - 6.0 %    Basophils % 0.3 0.0 - 2.0 %    Neutrophils Absolute 15.71 (H) 1.20 - 7.70 x10*3/uL    Immature Granulocytes Absolute, Automated 0.09 0.00 - 0.70 x10*3/uL    Lymphocytes Absolute 0.46 (L) 1.20 - 4.80 x10*3/uL    Monocytes Absolute 1.10 (H) 0.10 - 1.00 x10*3/uL    Eosinophils Absolute 0.00 0.00 - 0.70 x10*3/uL    Basophils Absolute 0.05 0.00 - 0.10 x10*3/uL   Comprehensive metabolic panel   Result Value Ref Range    Glucose 546 (HH) 65 - 99 mg/dL    Sodium 123 (L) 133 - 145 mmol/L    Potassium 4.0 3.4 - 5.1 mmol/L    Chloride 86 (L) 97 - 107 mmol/L    Bicarbonate 20 (L) 24 - 31 mmol/L    Urea Nitrogen 29 (H) 8 - 25 mg/dL    Creatinine 1.50 0.40 - 1.60 mg/dL    eGFR 54 (L) >60 mL/min/1.73m*2    Calcium 9.1 8.5 - 10.4 mg/dL    Albumin 3.4 (L) 3.5 - 5.0 g/dL    Alkaline Phosphatase 96 35 - 125 U/L    Total Protein 8.1 (H) 5.9 - 7.9 g/dL    AST 18 5 - 40 U/L    Bilirubin, Total 1.0 0.1 - 1.2 mg/dL    ALT 19 5 - 40 U/L    Anion Gap 17 <=19 mmol/L   Lipase   Result Value Ref Range    Lipase 59 " 16 - 63 U/L   NT Pro-BNP   Result Value Ref Range    PROBNP 755 (H) 0 - 177 pg/mL   BLOOD GAS VENOUS   Result Value Ref Range    POCT pH, Venous 7.49 (H) 7.33 - 7.43 pH    POCT pCO2, Venous 30 (L) 41 - 51 mm Hg    POCT pO2, Venous 56 (H) 35 - 45 mm Hg    POCT SO2, Venous 90 (H) 45 - 75 %    POCT Oxy Hemoglobin, Venous 87.6 (H) 45.0 - 75.0 %    POCT Base Excess, Venous 0.4 -2.0 - 3.0 mmol/L    POCT HCO3 Calculated, Venous 22.9 22.0 - 26.0 mmol/L    Patient Temperature 37.0 degrees Celsius    FiO2 21 %   Beta Hydroxybutyrate   Result Value Ref Range    Beta-Hydroxybutyrate 1.50 (H) 0.10 - 0.30 mmol/L   Magnesium   Result Value Ref Range    Magnesium 1.30 (L) 1.60 - 3.10 mg/dL   Blood Gas Lactic Acid, Venous   Result Value Ref Range    POCT Lactate, Venous 2.4 (H) 0.4 - 2.0 mmol/L   Blood Culture    Specimen: Peripheral Venipuncture; Blood culture   Result Value Ref Range    Blood Culture Escherichia coli (AA)     BLOOD CULTURE BOTTLE  Positive Aerobic Bottle     Gram Stain Gram negative bacilli (AA)     Gram Stain Gram negative bacilli (AA)        Susceptibility    Escherichia coli - MICROSCAN     Amoxicillin/Clavulanate  Susceptible mcg/mL     Ampicillin  Resistant mcg/mL     Ampicillin/Sulbactam  Resistant mcg/mL     Cefazolin  Resistant mcg/mL     Ceftriaxone  Susceptible mcg/mL     Cefuroxime  Susceptible mcg/mL     Ciprofloxacin  Susceptible mcg/mL     Gentamicin  Susceptible mcg/mL     Levofloxacin  Susceptible mcg/mL     Piperacillin/Tazobactam  Susceptible mcg/mL     Trimethoprim/Sulfamethoxazole  Resistant mcg/mL   Blood Culture    Specimen: Peripheral Venipuncture; Blood culture   Result Value Ref Range    Blood Culture Escherichia coli (AA)     BLOOD CULTURE BOTTLE  Positive Aerobic and Anaerobic Bottle     Gram Stain Gram negative bacilli (AA)     Gram Stain Gram negative bacilli (AA)    Serial Troponin, Initial ([facility])   Result Value Ref Range    Troponin T, High Sensitivity 27 (HH) <=14 ng/L   ECG 12 lead    Result Value Ref Range    Ventricular Rate 134 BPM    Atrial Rate 134 BPM    MI Interval 154 ms    QRS Duration 92 ms    QT Interval 288 ms    QTC Calculation(Bazett) 430 ms    P Axis 54 degrees    R Axis -46 degrees    T Axis 66 degrees    QRS Count 22 beats    Q Onset 213 ms    P Onset 136 ms    P Offset 186 ms    T Offset 357 ms    QTC Fredericia 376 ms   Sars-CoV-2 PCR   Result Value Ref Range    Coronavirus 2019, PCR Not Detected Not Detected   Influenza A, and B PCR   Result Value Ref Range    Flu A Result Not Detected Not Detected    Flu B Result Not Detected Not Detected   Urinalysis with Reflex Culture and Microscopic   Result Value Ref Range    Color, Urine Yellow Light-Yellow, Yellow, Dark-Yellow    Appearance, Urine Ex.Turbid (N) Clear    Specific Gravity, Urine 1.023 1.005 - 1.035    pH, Urine 6.0 5.0, 5.5, 6.0, 6.5, 7.0, 7.5, 8.0    Protein, Urine 300 (3+) (A) NEGATIVE, 10 (TRACE), 20 (TRACE) mg/dL    Glucose, Urine OVER (4+) (A) Normal mg/dL    Blood, Urine OVER (3+) (A) NEGATIVE    Ketones, Urine TRACE (A) NEGATIVE mg/dL    Bilirubin, Urine NEGATIVE NEGATIVE    Urobilinogen, Urine Normal Normal mg/dL    Nitrite, Urine NEGATIVE NEGATIVE    Leukocyte Esterase, Urine 500 Jesika/µL (A) NEGATIVE   Microscopic Only, Urine   Result Value Ref Range    WBC, Urine >50 (A) 1-5, NONE /HPF    WBC Clumps, Urine MANY Reference range not established. /HPF    RBC, Urine >20 (A) NONE, 1-2, 3-5 /HPF    Squamous Epithelial Cells, Urine 1-9 (SPARSE) Reference range not established. /HPF    Bacteria, Urine 3+ (A) NONE SEEN /HPF    Mucus, Urine 1+ Reference range not established. /LPF   Urine Culture    Specimen: Clean Catch/Voided; Urine   Result Value Ref Range    Urine Culture >100,000 Escherichia coli (A)     Urine Culture 20,000 - 80,000 Escherichia coli (A)        Susceptibility    Escherichia coli - MICROSCAN     Ampicillin  Resistant mcg/mL     Amoxicillin/Clavulanate  Susceptible mcg/mL     Ampicillin/Sulbactam   Susceptible mcg/mL     Cefazolin  Susceptible mcg/mL     Cefazolin (uncomplicated UTIs only)  Susceptible mcg/mL     Ciprofloxacin  Susceptible mcg/mL     Gentamicin  Susceptible mcg/mL     Nitrofurantoin  Susceptible mcg/mL     Piperacillin/Tazobactam  Susceptible mcg/mL     Trimethoprim/Sulfamethoxazole  Resistant mcg/mL    Escherichia coli - MICROSCAN     Ampicillin  Resistant mcg/mL     Amoxicillin/Clavulanate  Susceptible mcg/mL     Ampicillin/Sulbactam  Intermediate mcg/mL     Cefazolin  Susceptible mcg/mL     Cefazolin (uncomplicated UTIs only)  Susceptible mcg/mL     Ciprofloxacin  Susceptible mcg/mL     Gentamicin  Susceptible mcg/mL     Nitrofurantoin  Susceptible mcg/mL     Piperacillin/Tazobactam  Susceptible mcg/mL     Trimethoprim/Sulfamethoxazole  Resistant mcg/mL   POCT GLUCOSE   Result Value Ref Range    POCT Glucose 526 (H) 74 - 99 mg/dL   POCT GLUCOSE   Result Value Ref Range    POCT Glucose 464 (H) 74 - 99 mg/dL   POCT GLUCOSE   Result Value Ref Range    POCT Glucose 437 (H) 74 - 99 mg/dL   Serial Troponin, 2 Hour ([facility])   Result Value Ref Range    Troponin T, High Sensitivity 28 (HH) <=14 ng/L   BLOOD GAS LACTIC ACID, VENOUS   Result Value Ref Range    POCT Lactate, Venous 1.7 0.4 - 2.0 mmol/L   Basic metabolic panel   Result Value Ref Range    Glucose 427 (H) 65 - 99 mg/dL    Sodium 124 (L) 133 - 145 mmol/L    Potassium 3.6 3.4 - 5.1 mmol/L    Chloride 89 (L) 97 - 107 mmol/L    Bicarbonate 21 (L) 24 - 31 mmol/L    Urea Nitrogen 36 (H) 8 - 25 mg/dL    Creatinine 2.00 (H) 0.40 - 1.60 mg/dL    eGFR 38 (L) >60 mL/min/1.73m*2    Calcium 8.2 (L) 8.5 - 10.4 mg/dL    Anion Gap 14 <=19 mmol/L   Creatine Kinase   Result Value Ref Range    Creatine Kinase 189 24 - 195 U/L   Magnesium   Result Value Ref Range    Magnesium 1.50 (L) 1.60 - 3.10 mg/dL   CBC   Result Value Ref Range    WBC 11.9 (H) 4.4 - 11.3 x10*3/uL    nRBC 0.0 0.0 - 0.0 /100 WBCs    RBC 4.32 (L) 4.50 - 5.90 x10*6/uL    Hemoglobin 11.7 (L)  13.5 - 17.5 g/dL    Hematocrit 34.7 (L) 41.0 - 52.0 %    MCV 80 80 - 100 fL    MCH 27.1 26.0 - 34.0 pg    MCHC 33.7 32.0 - 36.0 g/dL    RDW 14.1 11.5 - 14.5 %    Platelets 147 (L) 150 - 450 x10*3/uL   POCT GLUCOSE   Result Value Ref Range    POCT Glucose 349 (H) 74 - 99 mg/dL   POCT GLUCOSE   Result Value Ref Range    POCT Glucose 383 (H) 74 - 99 mg/dL   Serial Troponin, 6 Hour ([facility])   Result Value Ref Range    Troponin T, High Sensitivity 27 (HH) <=14 ng/L   POCT GLUCOSE   Result Value Ref Range    POCT Glucose 379 (H) 74 - 99 mg/dL   Magnesium, Urine Random   Result Value Ref Range    Magnesium, Urine Random 1.17 mg/dL    Creatinine, Urine Random 212.5 20.0 - 370.0 mg/dL    Magnesium/Creatinine Ratio 5.5 Not established. mg/g Creat   POCT GLUCOSE   Result Value Ref Range    POCT Glucose 377 (H) 74 - 99 mg/dL   POCT GLUCOSE   Result Value Ref Range    POCT Glucose 360 (H) 74 - 99 mg/dL   POCT GLUCOSE   Result Value Ref Range    POCT Glucose 270 (H) 74 - 99 mg/dL   Basic Metabolic Panel   Result Value Ref Range    Glucose 257 (H) 65 - 99 mg/dL    Sodium 126 (L) 133 - 145 mmol/L    Potassium 3.2 (L) 3.4 - 5.1 mmol/L    Chloride 91 (L) 97 - 107 mmol/L    Bicarbonate 22 (L) 24 - 31 mmol/L    Urea Nitrogen 43 (H) 8 - 25 mg/dL    Creatinine 2.30 (H) 0.40 - 1.60 mg/dL    eGFR 33 (L) >60 mL/min/1.73m*2    Calcium 8.4 (L) 8.5 - 10.4 mg/dL    Anion Gap 13 <=19 mmol/L   CBC and Auto Differential   Result Value Ref Range    WBC 11.0 4.4 - 11.3 x10*3/uL    nRBC 0.0 0.0 - 0.0 /100 WBCs    RBC 4.36 (L) 4.50 - 5.90 x10*6/uL    Hemoglobin 11.7 (L) 13.5 - 17.5 g/dL    Hematocrit 36.0 (L) 41.0 - 52.0 %    MCV 83 80 - 100 fL    MCH 26.8 26.0 - 34.0 pg    MCHC 32.5 32.0 - 36.0 g/dL    RDW 14.1 11.5 - 14.5 %    Platelets 151 150 - 450 x10*3/uL    Neutrophils % 78.0 40.0 - 80.0 %    Immature Granulocytes %, Automated 0.8 0.0 - 0.9 %    Lymphocytes % 8.1 13.0 - 44.0 %    Monocytes % 11.1 2.0 - 10.0 %    Eosinophils % 1.6 0.0 - 6.0 %     Basophils % 0.4 0.0 - 2.0 %    Neutrophils Absolute 8.55 (H) 1.20 - 7.70 x10*3/uL    Immature Granulocytes Absolute, Automated 0.09 0.00 - 0.70 x10*3/uL    Lymphocytes Absolute 0.89 (L) 1.20 - 4.80 x10*3/uL    Monocytes Absolute 1.22 (H) 0.10 - 1.00 x10*3/uL    Eosinophils Absolute 0.18 0.00 - 0.70 x10*3/uL    Basophils Absolute 0.04 0.00 - 0.10 x10*3/uL   Magnesium   Result Value Ref Range    Magnesium 2.10 1.60 - 3.10 mg/dL   POCT GLUCOSE   Result Value Ref Range    POCT Glucose 278 (H) 74 - 99 mg/dL   POCT GLUCOSE   Result Value Ref Range    POCT Glucose 304 (H) 74 - 99 mg/dL   POCT GLUCOSE   Result Value Ref Range    POCT Glucose 252 (H) 74 - 99 mg/dL   POCT GLUCOSE   Result Value Ref Range    POCT Glucose 239 (H) 74 - 99 mg/dL   Renal Function Panel   Result Value Ref Range    Glucose 204 (H) 65 - 99 mg/dL    Sodium 130 (L) 133 - 145 mmol/L    Potassium 3.6 3.4 - 5.1 mmol/L    Chloride 95 (L) 97 - 107 mmol/L    Bicarbonate 22 (L) 24 - 31 mmol/L    Urea Nitrogen 42 (H) 8 - 25 mg/dL    Creatinine 1.90 (H) 0.40 - 1.60 mg/dL    eGFR 41 (L) >60 mL/min/1.73m*2    Calcium 8.6 8.5 - 10.4 mg/dL    Phosphorus 2.5 2.5 - 4.5 mg/dL    Albumin 2.7 (L) 3.5 - 5.0 g/dL    Anion Gap 13 <=19 mmol/L   CBC   Result Value Ref Range    WBC 10.7 4.4 - 11.3 x10*3/uL    nRBC 0.0 0.0 - 0.0 /100 WBCs    RBC 4.18 (L) 4.50 - 5.90 x10*6/uL    Hemoglobin 11.3 (L) 13.5 - 17.5 g/dL    Hematocrit 34.5 (L) 41.0 - 52.0 %    MCV 83 80 - 100 fL    MCH 27.0 26.0 - 34.0 pg    MCHC 32.8 32.0 - 36.0 g/dL    RDW 14.3 11.5 - 14.5 %    Platelets 166 150 - 450 x10*3/uL   POCT GLUCOSE   Result Value Ref Range    POCT Glucose 197 (H) 74 - 99 mg/dL   POCT GLUCOSE   Result Value Ref Range    POCT Glucose 256 (H) 74 - 99 mg/dL        No longer w perineal drainage  Recommend a fu upon discharge in 2-3 weeks

## 2024-08-15 NOTE — PROGRESS NOTES
"Occupational Therapy    OT Treatment    Patient Name: Wilber Pennington \"Wong\"  MRN: [id number]  Today's Date: 8/15/2024  Time Calculation  Start Time: 1606  Stop Time: 1645  Time Calculation (min): 39 min        Assessment:  OT Assessment: OT goals met this date. Will discontinue OT services throughout LOS. Pt may benefit from continued OT services at low intensity/ frequency upon discharge for EC/WS techniques and functional tolerance to maximize safety and independence during ADL/IADLs.  Barriers to Discharge: None  Evaluation/Treatment Tolerance: Patient tolerated treatment well  End of Session Communication: Bedside nurse  End of Session Patient Position: Bed, 2 rail up, Alarm off, not on at start of session (all needs in reach)  OT Assessment Results: Decreased endurance  Barriers to Discharge: None  Evaluation/Treatment Tolerance: Patient tolerated treatment well  Plan:  Treatment Interventions: ADL retraining, Endurance training, Patient/family training, Equipment evaluation/education, Compensatory technique education  No Skilled OT: At baseline function  OT Frequency: Other (Comment)  OT Discharge Recommendations: Low intensity level of continued care  OT Recommended Transfer Status: Assist of 1  OT - OK to Discharge: Yes  Treatment Interventions: ADL retraining, Endurance training, Patient/family training, Equipment evaluation/education, Compensatory technique education    Subjective   Previous Visit Info:  OT Last Visit  OT Received On: 08/15/24  General:  General  Reason for Referral: impaired ADLS, PNA  Past Medical History Relevant to Rehab: anxiety, DM, HTN, KARINA, eye surgery  Family/Caregiver Present: Yes  Caregiver Feedback: spouse present- questions, comments and concerns addressed regarding OT.  Prior to Session Communication: Bedside nurse  Patient Position Received: Bed, 2 rail up, Alarm off, not on at start of session  General Comment: Pt cleared for OT session per nursing, pleasant and cooperative " this date.  Precautions:  Hearing/Visual Limitations: hearing WFL, R eye blindness, glasses  Medical Precautions: Fall precautions, Oxygen therapy device and L/min (3L O2 via NC)  Vital Signs:     Pain:  Pain Assessment  Pain Assessment: 0-10  0-10 (Numeric) Pain Score: 0 - No pain  Clinical Progression: Not changed    Objective    Cognition:  Cognition  Overall Cognitive Status: Within Functional Limits  Attention: Within Functional Limits  Safety/Judgement: Exceptions to WFL  Insight: Mild  Impulsive: Mildly  Task Initiation: WFL  Processing Speed: Within funtional limits  Coordination:  Movements are Fluid and Coordinated: Yes  Activities of Daily Living: UE Bathing  UE Bathing Comments: UB bathing tasks completed at modified independent level in shower with set-up    LE Bathing  LE Bathing Comments: LB bathing tasks completed at modified independent level in shower using tub bench in standing to elevate unilateral LE to complete bathing tasks. Pt reports use of long handle sponge at home. Increased time required to complete.    UE Dressing  UE Dressing Comments: pt able to don/doff hospital gown independently in standing    LE Dressing  LE Dressing: Yes  LE Dressing Comments: pt able to don/doff socks x2 from seated position at modified independent level with increased time.  Functional Standing Tolerance:  Time: >12min  Activity: ADL completion  Functional Standing Tolerance Comments: Standing tasks completed this date at distant supervision to increase functional tolerance, strength and challenge balance to maximize safety and independence during ADLs.  Bed Mobility/Transfers: Bed Mobility  Bed Mobility: No    Transfers  Transfer: Yes  Transfer 1  Trials/Comments 1: sit<>stands completed from standard EOB, chair and tub bench heights at modified independent level- min verbal cues required for O2 line management to increase safety and decrease risk of falls.    Shower Transfers  Shower Transfer From:  Bed  Shower Transfer Type: To and from  Shower Transfer to: Transfer tub bench  Shower Transfer Technique: Ambulating  Shower Transfers: Supervision      Functional Mobility:  Functional Mobility  Functional Mobility Performed: Yes  Functional Mobility 1  Surface 1: Level tile  Device 1: No device  Assistance 1: Distant supervision  Quality of Functional Mobility 1: Wide base of support  Comments 1: Functional mobility trials completed at household distances to increase functional tolerance and assess safety awareness to increase overall safety and independence prior to home-going. Pt mildly impulsive requiring min verbal cues for self-pacing to decrease risk of falls.  Standing Balance:  Dynamic Standing Balance  Dynamic Standing-Balance Support: No upper extremity supported  Dynamic Standing-Level of Assistance: Distant supervision  Dynamic Standing-Balance: Reaching for objects, Reaching across midline, Forward lean, Lateral lean, Turning  Dynamic Standing-Comments: Good standing balance observed this date during functional task completion.      Outcome Measures:[facility] Daily Activity  Putting on and taking off regular lower body clothing: A little  Bathing (including washing, rinsing, drying): None  Putting on and taking off regular upper body clothing: None  Toileting, which includes using toilet, bedpan or urinal: None  Taking care of personal grooming such as brushing teeth: None  Eating Meals: None  Daily Activity - Total Score: 23        Education Documentation  ADL Training, taught by LORENA Rawls at 8/15/2024  5:48 PM.  Learner: Significant Other, Patient  Readiness: Acceptance  Method: Explanation, Demonstration  Response: Verbalizes Understanding, Demonstrated Understanding    Education Comments  Education provided on role of OT/POC, safety awareness throughout functional tasks/transfers, importance of activity/ rest routine, EC/WS techniques, and use of call light for assistance. Questions,  comments and concerns addressed regarding OT.      Goals:  Encounter Problems       Encounter Problems (Resolved)       Bathing       STG - Patient will bathe lower body independent with AE as needed (Met)       Start:  08/13/24    Expected End:  09/13/24    Resolved:  08/15/24            Dressings Lower Extremities       LTG - Patient will dress lower body independent with AE as needed (Met)       Start:  08/13/24    Expected End:  09/13/24    Resolved:  08/15/24            Functional Mobility       patient will perform functional mobility a household/community distance independent with 2ww as needed (Met)       Start:  08/13/24    Expected End:  09/13/24    Resolved:  08/15/24            Toileting       STG - Patient will complete toileting tasks with 2ww  independent (Met)       Start:  08/13/24    Expected End:  09/13/24    Resolved:  08/15/24

## 2024-08-15 NOTE — ASSESSMENT & PLAN NOTE
Continue Nystatin powder and Probiotic   Scrotal abscess - spontaneously draining   Urology following - appreciate recs.

## 2024-08-15 NOTE — PROGRESS NOTES
INFECTIOUS DISEASES PROGRESS NOTE    Consulted / following patient for:  E. coli septicemia    Subjective   Interval History:   Pleased with his progress, overall feeling much better.  Able to get up and ambulate in the room    Objective   PHYSICAL EXAMINATION  Vital signs:  Visit Vitals  /66 (BP Location: Right arm, Patient Position: Sitting)   Pulse 96   Temp 37 °C (98.6 °F) (Temporal)   Resp 17      General: Nontoxic, no acute distress  Lungs:  Clear to auscultation  Heart:  S1, S2   Abdomen:  Soft, obese, nontender.  Genitalia: Decreasing scrotal erythema.  No drainage from small left-sided  parascrotal abscess.  Less indurated and tender today  Extremities:  No cords, phlebitis, cellulitis    Relevant Results  WBC: 17,400 --> 11,900 ---> 11,000 ---> 10,700  Creatinine: (Baseline 0.9) 1.5 ---> 2.0 ---> 2.3 ---> 1.9    Results from last 72 hours   Lab Units 08/12/24  1443   AST U/L 18   ALT U/L 19   ALK PHOS U/L 96   BILIRUBIN TOTAL mg/dL 1.0     Microbiology:  Blood: E. coli, resistant to ampicillin, Unasyn, cefazolin, TMP-SMX.  Susceptible to ceftriaxone  Urine: High colony count E. coli, similar susceptibility pattern    Imaging:  CXR images personally reviewed:   Large right intrarenal calculi including 1.5 cm calculus  within the right renal pelvis. No hydronephrosis. No evidence of  acute bowel pathology. Hepatomegaly.      ASSESSMENT:  E. coli septicemia  E. coli sepsis of urinary origin.  There is nephrolithiasis without obstruction, and a small  parascrotal perineal abscess.  Making good clinical progress, tolerating ceftriaxone.  Appreciate Dr. Moody's consultation    Acute kidney injury  Improving    Obesity/diabetes    PLANS:  -   Continue ceftriaxone today  -   If stable 8/16, anticipate transition to ciprofloxacin to complete a total of 14 days of antibiotic therapy    Discussed in detail with spouse at bedside    Nathanael Villa MD  [facility]  Office:  [phone]

## 2024-08-15 NOTE — ASSESSMENT & PLAN NOTE
Hold home metformin   Glucose AC/HS with SSI coverage  Lantus 50 units nightly (takes Tresiba 70 units daily at home)   Hypoglycemia protocol

## 2024-08-15 NOTE — CONSULTS
Reason For Consult  Urosepsis, perineal abscess    History Of Present Illness  Wong Pennington is a 54 y.o. male presenting with severe fatigue and dizziness.  This has been going on for several days.  He does have a history of poorly controlled diabetes and obesity.  He has also had a cough..  On the day of admission he was having increasing weakness and equilibrium issues.  In the emergency room he was found to have a fever and an elevated white blood cell count of 17.4 with a left shift.  Blood cultures are growing E. coli in 2 bottles.  His urine culture is positive for enteric bacilli.  Assumedly this is also E. coli.  He has been started on Zosyn 4.5 g every 6 hours.  A CT of the abdomen and pelvis shows a 1.5 cm calculus in the right renal pelvis but no evidence of hydronephrosis.  He also has been found to have an abnormal area in his perineum which has been somewhat tender..  His white blood cell count has improved to 11.0 since starting antibiotics.On admission his glucose was markedly elevated at 646..  His renal function was normal on admission with a BUN of 29 and creatinine of 1.5.  Since admission it has risen to 43 and 2.3.     Past Medical History  He has a past medical history of Anxiety, Diabetes mellitus (Multi), Hypertension, and KARINA (obstructive sleep apnea).    Surgical History  He has a past surgical history that includes Vasectomy and Eye surgery.     Social History  He reports that he has never smoked. He has never used smokeless tobacco. He reports current alcohol use. He reports that he does not use drugs.    Family History  Family History   Problem Relation Name Age of Onset    Heart failure Mother          Congested    Other (cerebrovascular disease) Mother      Hypertension Mother      Heart failure Father          Congested    Coronary artery disease Father      Hypertension Father      Multiple sclerosis Sister      Other (Gluten sensitive enteropathy) Sister      Hypertension Brother  "     Other (Gluten sensitive enteropathy) Brother      Other (ADHD) Brother      Celiac disease Brother      Hypertension Brother          Allergies  Patient has no known allergies.    Review of Systems  As per admission HPI     Physical Exam  Morbidly obese.  Awake, alert, oriented  HEENT: Normal extraocular movements  Neck: Supple  Lungs: Normal respiratory pattern  Abdomen: Morbidly obese  : Normal testes and epididymis.  Normal penis.  Perineum: On the left side of his perineum is a small perineal abscess that is draining.  With compression there is purulence able to be expressed.  It is minimally tender.     Last Recorded Vitals  Blood pressure 133/67, pulse 72, temperature (!) 38 °C (100.4 °F), temperature source Temporal, resp. rate 18, height 1.778 m (5' 10\"), weight (!) 176 kg (387 lb 6.4 oz), SpO2 96%.    Relevant Results      Scheduled medications  allopurinol, 300 mg, oral, Nightly  [Held by provider] amLODIPine, 5 mg, oral, Daily  aspirin, 81 mg, oral, Daily  atorvastatin, 10 mg, oral, Nightly  busPIRone, 10 mg, oral, BID  cefTRIAXone, 2 g, intravenous, q24h  cholecalciferol, 1,000 Units, oral, BID  fluticasone, 1 spray, Each Nostril, Daily  heparin, 5,000 Units, subcutaneous, q8h  [Held by provider] hydroCHLOROthiazide, 50 mg, oral, Daily  insulin glargine, 50 Units, subcutaneous, Nightly  insulin lispro, 0-20 Units, subcutaneous, TID  lactobacillus acidophilus, 1 tablet, oral, BID  [Held by provider] lisinopril, 40 mg, oral, Nightly  metoprolol tartrate, 50 mg, oral, BID  nystatin, 1 Application, Topical, BID  potassium chloride CR, 10 mEq, oral, BID after meals      Continuous medications  sodium chloride 0.9%, 75 mL/hr, Last Rate: 75 mL/hr (08/14/24 1812)      PRN medications  PRN medications: acetaminophen, benzocaine-menthol, dextrose, dextrose, glucagon, glucagon, guaiFENesin, ipratropium-albuteroL, loperamide, ondansetron **OR** ondansetron, oxygen, polyethylene glycol  Results for orders " placed or performed during the hospital encounter of 08/12/24 (from the past 24 hour(s))   POCT GLUCOSE   Result Value Ref Range    POCT Glucose 270 (H) 74 - 99 mg/dL   Basic Metabolic Panel   Result Value Ref Range    Glucose 257 (H) 65 - 99 mg/dL    Sodium 126 (L) 133 - 145 mmol/L    Potassium 3.2 (L) 3.4 - 5.1 mmol/L    Chloride 91 (L) 97 - 107 mmol/L    Bicarbonate 22 (L) 24 - 31 mmol/L    Urea Nitrogen 43 (H) 8 - 25 mg/dL    Creatinine 2.30 (H) 0.40 - 1.60 mg/dL    eGFR 33 (L) >60 mL/min/1.73m*2    Calcium 8.4 (L) 8.5 - 10.4 mg/dL    Anion Gap 13 <=19 mmol/L   CBC and Auto Differential   Result Value Ref Range    WBC 11.0 4.4 - 11.3 x10*3/uL    nRBC 0.0 0.0 - 0.0 /100 WBCs    RBC 4.36 (L) 4.50 - 5.90 x10*6/uL    Hemoglobin 11.7 (L) 13.5 - 17.5 g/dL    Hematocrit 36.0 (L) 41.0 - 52.0 %    MCV 83 80 - 100 fL    MCH 26.8 26.0 - 34.0 pg    MCHC 32.5 32.0 - 36.0 g/dL    RDW 14.1 11.5 - 14.5 %    Platelets 151 150 - 450 x10*3/uL    Neutrophils % 78.0 40.0 - 80.0 %    Immature Granulocytes %, Automated 0.8 0.0 - 0.9 %    Lymphocytes % 8.1 13.0 - 44.0 %    Monocytes % 11.1 2.0 - 10.0 %    Eosinophils % 1.6 0.0 - 6.0 %    Basophils % 0.4 0.0 - 2.0 %    Neutrophils Absolute 8.55 (H) 1.20 - 7.70 x10*3/uL    Immature Granulocytes Absolute, Automated 0.09 0.00 - 0.70 x10*3/uL    Lymphocytes Absolute 0.89 (L) 1.20 - 4.80 x10*3/uL    Monocytes Absolute 1.22 (H) 0.10 - 1.00 x10*3/uL    Eosinophils Absolute 0.18 0.00 - 0.70 x10*3/uL    Basophils Absolute 0.04 0.00 - 0.10 x10*3/uL   Magnesium   Result Value Ref Range    Magnesium 2.10 1.60 - 3.10 mg/dL   POCT GLUCOSE   Result Value Ref Range    POCT Glucose 278 (H) 74 - 99 mg/dL   POCT GLUCOSE   Result Value Ref Range    POCT Glucose 304 (H) 74 - 99 mg/dL   POCT GLUCOSE   Result Value Ref Range    POCT Glucose 252 (H) 74 - 99 mg/dL   POCT GLUCOSE   Result Value Ref Range    POCT Glucose 239 (H) 74 - 99 mg/dL        Assessment/Plan     Urosepsis: He has E. coli growing in 2 blood  cultures.  He has enteric bacilli, likely E. coli, growing from his urine cultures.  He is currently being treated with culture specific Zosyn.  Would defer to ID as far as length of treatment.    Right renal calculus: He has a 1.5 cm right real calculus.  It is nonobstructing.  I do not feel he needs to be stented at this point.  The stone could be the nidus of infection though given his poorly controlled diabetes this could be just a severe urinary tract infection not related to the stone.  Ultimately, however, I feel the stone should be treated.    SEAN: His BUN and creatinine are rising.  This is likely due to the sepsis as there is no evidence of obstruction.    Perineal abscess: He has a small left sided perineal abscess.  It is draining spontaneously.  There is not significant induration nor redness.  I do not believe he has a developing Summer's gangrene.  As long as this is draining I would let it drain spontaneously but would recommend placing warm compresses on the perineum.  Will continue to follow this.    Rodriguez Moody MD

## 2024-08-15 NOTE — CARE PLAN
Problem: Bathing  Goal: STG - Patient will bathe lower body independent with AE as needed  Outcome: Progressing     Problem: Dressings Lower Extremities  Goal: LTG - Patient will dress lower body independent with AE as needed  Outcome: Progressing     Problem: Toileting  Goal: STG - Patient will complete toileting tasks with 2ww  independent  Outcome: Progressing     Problem: Pain - Adult  Goal: Verbalizes/displays adequate comfort level or baseline comfort level  Outcome: Progressing     Problem: Safety - Adult  Goal: Free from fall injury  Outcome: Progressing     Problem: Discharge Planning  Goal: Discharge to home or other facility with appropriate resources  Outcome: Progressing     Problem: Chronic Conditions and Co-morbidities  Goal: Patient's chronic conditions and co-morbidity symptoms are monitored and maintained or improved  Outcome: Progressing     Problem: Diabetes  Goal: Achieve decreasing blood glucose levels by end of shift  Outcome: Progressing  Goal: Increase stability of blood glucose readings by end of shift  Outcome: Progressing  Goal: Decrease in ketones present in urine by end of shift  Outcome: Progressing  Goal: Maintain electrolyte levels within acceptable range throughout shift  Outcome: Progressing  Goal: Maintain glucose levels >70mg/dl to <250mg/dl throughout shift  Outcome: Progressing  Goal: No changes in neurological exam by end of shift  Outcome: Progressing  Goal: Learn about and adhere to nutrition recommendations by end of shift  Outcome: Progressing  Goal: Vital signs within normal range for age by end of shift  Outcome: Progressing  Goal: Increase self care and/or family involovement by end of shift  Outcome: Progressing  Goal: Receive DSME education by end of shift  Outcome: Progressing     Problem: Fall/Injury  Goal: Not fall by end of shift  Outcome: Progressing  Goal: Be free from injury by end of the shift  Outcome: Progressing  Goal: Verbalize understanding of personal  risk factors for fall in the hospital  Outcome: Progressing  Goal: Verbalize understanding of risk factor reduction measures to prevent injury from fall in the home  Outcome: Progressing  Goal: Use assistive devices by end of the shift  Outcome: Progressing  Goal: Pace activities to prevent fatigue by end of the shift  Outcome: Progressing   The patient's goals for the shift include lower blood sugar; no falls    The clinical goals for the shift include comfort    Over the shift, the patient did not make progress toward the following goals. Barriers to progression include none. Recommendations to address these barriers include none.

## 2024-08-15 NOTE — PROGRESS NOTES
[facility] spoke to patient and spouse at bedside. Patient is independent from home with spouse. No medical equipment or assistive devices. Patient drives and is able to get to all appointments. Dr higginbotham is PCP and [facility] choice.   [facility] provided POA paperwork for patient to review at this time   Patient refusing any skilled needs at this time   Patient to return home upon discharge        08/15/24 1120   Discharge Planning   Living Arrangements Spouse/significant other   Support Systems Spouse/significant other   Assistance Needed none   Type of Residence Private residence   Number of Stairs to Enter Residence 4   Number of Stairs Within Residence 13  (both upstairs and basement)   Do you have animals or pets at home? Yes   Who is requesting discharge planning? Provider   Home or Post Acute Services None   Expected Discharge Disposition Home   Does the patient need discharge transport arranged? No   Financial Resource Strain   How hard is it for you to pay for the very basics like food, housing, medical care, and heating? Not hard   Housing Stability   In the last 12 months, was there a time when you were not able to pay the mortgage or rent on time? N   In the past 12 months, how many times have you moved where you were living? 0   At any time in the past 12 months, were you homeless or living in a shelter (including now)? N   Transportation Needs   In the past 12 months, has lack of transportation kept you from medical appointments or from getting medications? no

## 2024-08-15 NOTE — ASSESSMENT & PLAN NOTE
Baseline creatinine ~ 0.9   Improving today    Nephrology following - appreciate recs    Abdomen/pelvis CT shows renal calculi with no hydronephrosis or stranding   - Urology consulted - appreciate recs    Hold lisinopril/metformin    Monitor daily labs

## 2024-08-16 VITALS
BODY MASS INDEX: 45.1 KG/M2 | OXYGEN SATURATION: 96 % | DIASTOLIC BLOOD PRESSURE: 74 MMHG | RESPIRATION RATE: 18 BRPM | HEIGHT: 70 IN | WEIGHT: 315 LBS | HEART RATE: 86 BPM | SYSTOLIC BLOOD PRESSURE: 140 MMHG | TEMPERATURE: 97.9 F

## 2024-08-16 PROBLEM — R78.81 BACTEREMIA: Chronic | Status: RESOLVED | Noted: 2024-08-14 | Resolved: 2024-08-16

## 2024-08-16 PROBLEM — N17.9 AKI (ACUTE KIDNEY INJURY) (CMS-HCC): Status: RESOLVED | Noted: 2024-08-14 | Resolved: 2024-08-16

## 2024-08-16 LAB
ALBUMIN SERPL-MCNC: 2.6 G/DL (ref 3.5–5)
ANION GAP SERPL CALC-SCNC: 10 MMOL/L
BACTERIA BLD AEROBE CULT: ABNORMAL
BACTERIA BLD AEROBE CULT: ABNORMAL
BACTERIA BLD CULT: ABNORMAL
BACTERIA BLD CULT: ABNORMAL
BUN SERPL-MCNC: 31 MG/DL (ref 8–25)
CALCIUM SERPL-MCNC: 8.5 MG/DL (ref 8.5–10.4)
CHLORIDE SERPL-SCNC: 98 MMOL/L (ref 97–107)
CO2 SERPL-SCNC: 22 MMOL/L (ref 24–31)
CREAT SERPL-MCNC: 1.3 MG/DL (ref 0.4–1.6)
EGFRCR SERPLBLD CKD-EPI 2021: 64 ML/MIN/1.73M*2
ERYTHROCYTE [DISTWIDTH] IN BLOOD BY AUTOMATED COUNT: 14.4 % (ref 11.5–14.5)
GLUCOSE BLD MANUAL STRIP-MCNC: 230 MG/DL (ref 74–99)
GLUCOSE SERPL-MCNC: 242 MG/DL (ref 65–99)
GRAM STN SPEC: ABNORMAL
HCT VFR BLD AUTO: 30.2 % (ref 41–52)
HGB BLD-MCNC: 10 G/DL (ref 13.5–17.5)
MCH RBC QN AUTO: 26.7 PG (ref 26–34)
MCHC RBC AUTO-ENTMCNC: 33.1 G/DL (ref 32–36)
MCV RBC AUTO: 81 FL (ref 80–100)
NRBC BLD-RTO: 0 /100 WBCS (ref 0–0)
PHOSPHATE SERPL-MCNC: 2.8 MG/DL (ref 2.5–4.5)
PLATELET # BLD AUTO: 180 X10*3/UL (ref 150–450)
POTASSIUM SERPL-SCNC: 3.4 MMOL/L (ref 3.4–5.1)
RBC # BLD AUTO: 3.75 X10*6/UL (ref 4.5–5.9)
SODIUM SERPL-SCNC: 130 MMOL/L (ref 133–145)
WBC # BLD AUTO: 9.8 X10*3/UL (ref 4.4–11.3)

## 2024-08-16 PROCEDURE — 2500000001 HC RX 250 WO HCPCS SELF ADMINISTERED DRUGS (ALT 637 FOR MEDICARE OP): Performed by: NURSE PRACTITIONER

## 2024-08-16 PROCEDURE — 36415 COLL VENOUS BLD VENIPUNCTURE: CPT | Performed by: NURSE PRACTITIONER

## 2024-08-16 PROCEDURE — 2500000005 HC RX 250 GENERAL PHARMACY W/O HCPCS: Performed by: NURSE PRACTITIONER

## 2024-08-16 PROCEDURE — 2500000004 HC RX 250 GENERAL PHARMACY W/ HCPCS (ALT 636 FOR OP/ED): Performed by: NURSE PRACTITIONER

## 2024-08-16 PROCEDURE — 2500000001 HC RX 250 WO HCPCS SELF ADMINISTERED DRUGS (ALT 637 FOR MEDICARE OP): Performed by: INTERNAL MEDICINE

## 2024-08-16 PROCEDURE — 82947 ASSAY GLUCOSE BLOOD QUANT: CPT

## 2024-08-16 PROCEDURE — 85027 COMPLETE CBC AUTOMATED: CPT | Performed by: NURSE PRACTITIONER

## 2024-08-16 PROCEDURE — 9420000001 HC RT PATIENT EDUCATION 5 MIN

## 2024-08-16 PROCEDURE — 94660 CPAP INITIATION&MGMT: CPT

## 2024-08-16 PROCEDURE — 80069 RENAL FUNCTION PANEL: CPT | Performed by: NURSE PRACTITIONER

## 2024-08-16 PROCEDURE — 2500000002 HC RX 250 W HCPCS SELF ADMINISTERED DRUGS (ALT 637 FOR MEDICARE OP, ALT 636 FOR OP/ED): Performed by: NURSE PRACTITIONER

## 2024-08-16 RX ORDER — NYSTATIN 100000 [USP'U]/G
1 POWDER TOPICAL 2 TIMES DAILY
Qty: 30 G | Refills: 0 | Status: SHIPPED | OUTPATIENT
Start: 2024-08-16

## 2024-08-16 RX ORDER — CIPROFLOXACIN 750 MG/1
750 TABLET, FILM COATED ORAL EVERY 12 HOURS
Qty: 20 TABLET | Refills: 0 | Status: SHIPPED | OUTPATIENT
Start: 2024-08-16 | End: 2024-08-26

## 2024-08-16 RX ORDER — ATORVASTATIN CALCIUM 10 MG/1
10 TABLET, FILM COATED ORAL NIGHTLY
Start: 2024-08-16

## 2024-08-16 RX ORDER — CIPROFLOXACIN 500 MG/1
750 TABLET ORAL EVERY 12 HOURS
Status: DISCONTINUED | OUTPATIENT
Start: 2024-08-16 | End: 2024-08-16 | Stop reason: HOSPADM

## 2024-08-16 ASSESSMENT — PAIN SCALES - GENERAL
PAINLEVEL_OUTOF10: 0 - NO PAIN

## 2024-08-16 ASSESSMENT — COGNITIVE AND FUNCTIONAL STATUS - GENERAL
DAILY ACTIVITIY SCORE: 24
MOBILITY SCORE: 24

## 2024-08-16 ASSESSMENT — PAIN - FUNCTIONAL ASSESSMENT
PAIN_FUNCTIONAL_ASSESSMENT: 0-10
PAIN_FUNCTIONAL_ASSESSMENT: 0-10

## 2024-08-16 ASSESSMENT — PAIN DESCRIPTION - ORIENTATION: ORIENTATION: MID

## 2024-08-16 ASSESSMENT — PAIN DESCRIPTION - LOCATION: LOCATION: HEAD

## 2024-08-16 NOTE — PROGRESS NOTES
INFECTIOUS DISEASES PROGRESS NOTE    Consulted / following patient for:  E. coli septicemia    Subjective   Interval History:   Continues to feel progressively better.  No perineal pain    Objective   PHYSICAL EXAMINATION  Vital signs:  Visit Vitals  /83 (BP Location: Left arm, Patient Position: Sitting)   Pulse 87   Temp 37.6 °C (99.7 °F) (Temporal)   Resp (!) 28   Low-grade fever to 38.2 last evening  General: Nontoxic, no acute distress  Genitalia: Decreasing scrotal erythema.  No drainage from small left-sided  parascrotal abscess.  Resolving induration.  No tenderness or suppuration    Relevant Results  WBC: 17,400 --> 11,900 ---> 11,000 ---> 10,700 ---> 9800  Creatinine: (Baseline 0.9) 1.5 ---> 2.0 ---> 2.3 ---> 1.9 ---> 1.3          Microbiology:  Blood: E. coli, resistant to ampicillin, Unasyn, cefazolin, TMP-SMX.  Susceptible to ceftriaxone and ciprofloxacin  Urine: High colony count E. coli, similar susceptibility pattern    Imaging:  CXR images personally reviewed:   Large right intrarenal calculi including 1.5 cm calculus  within the right renal pelvis. No hydronephrosis. No evidence of  acute bowel pathology. Hepatomegaly.      ASSESSMENT:  E. coli septicemia  E. coli sepsis of urinary origin.  There is nephrolithiasis without obstruction, and a small  parascrotal perineal abscess which is resolving.  Low-grade fever last evening noted.  Ready for transition to oral ciprofloxacin    Acute kidney injury  Continues to improve    Obesity/diabetes    PLANS:  -   Change antibiotic to ciprofloxacin 750 mg p.o. twice daily (high dose in view of body habitus) for 10 more days --- I have sent a prescription to his community pharmacy  -   Anticipate discharge soon, okay today from my perspective  -   Follow-up with Urology as directed  -   ID follow-up prn    WILL SIGN OFF.  PLEASE RE-CONSULT PRN.  THANK YOU.    Nathanael Villa MD  [facility]  Office:  [phone]

## 2024-08-16 NOTE — PROGRESS NOTES
08/16/24 0842   Discharge Planning   Expected Discharge Disposition Home     **Patient has a  safe discharge plan**

## 2024-08-16 NOTE — PROGRESS NOTES
SEAN resolved, sodium when corrected for glucose is on the low end of normal. Will sign off at this time, please call back with any questions, thank you.      Monserrat Byers MD

## 2024-08-16 NOTE — DISCHARGE INSTRUCTIONS
Do NOT resume hydrochlorothiazide or lisinopril until cleared by your PCP.     Resume metformin tomorrow     Resume Tresiba tonight     Schedule appointment with endocrinology as soon as able

## 2024-08-16 NOTE — PROGRESS NOTES
"Doing much better and looking forward to discharge  Blood pressure 140/74, pulse 86, temperature 36.6 °C (97.9 °F), temperature source Temporal, resp. rate 18, height 1.778 m (5' 10\"), weight (!) 176 kg (388 lb 3.7 oz), SpO2 96%.     I/O last 3 completed shifts:  In: 925 (5.3 mL/kg) [P.O.:875; IV Piggyback:50]  Out: 550 (3.1 mL/kg) [Urine:550 (0.1 mL/kg/hr)]  Weight: 176.1 kg   I/O this shift:  In: 240 [P.O.:240]  Out: -        PE:: on nasal cannula, appears comfortable sitting in chair, no sob nor wheeze, voiding spontaneously, no lynch    Results for orders placed or performed during the hospital encounter of 08/12/24 (from the past 24 hour(s))   POCT GLUCOSE   Result Value Ref Range    POCT Glucose 256 (H) 74 - 99 mg/dL   POCT GLUCOSE   Result Value Ref Range    POCT Glucose 249 (H) 74 - 99 mg/dL   POCT GLUCOSE   Result Value Ref Range    POCT Glucose 296 (H) 74 - 99 mg/dL   CBC   Result Value Ref Range    WBC 9.8 4.4 - 11.3 x10*3/uL    nRBC 0.0 0.0 - 0.0 /100 WBCs    RBC 3.75 (L) 4.50 - 5.90 x10*6/uL    Hemoglobin 10.0 (L) 13.5 - 17.5 g/dL    Hematocrit 30.2 (L) 41.0 - 52.0 %    MCV 81 80 - 100 fL    MCH 26.7 26.0 - 34.0 pg    MCHC 33.1 32.0 - 36.0 g/dL    RDW 14.4 11.5 - 14.5 %    Platelets 180 150 - 450 x10*3/uL   Renal Function Panel   Result Value Ref Range    Glucose 242 (H) 65 - 99 mg/dL    Sodium 130 (L) 133 - 145 mmol/L    Potassium 3.4 3.4 - 5.1 mmol/L    Chloride 98 97 - 107 mmol/L    Bicarbonate 22 (L) 24 - 31 mmol/L    Urea Nitrogen 31 (H) 8 - 25 mg/dL    Creatinine 1.30 0.40 - 1.60 mg/dL    eGFR 64 >60 mL/min/1.73m*2    Calcium 8.5 8.5 - 10.4 mg/dL    Phosphorus 2.8 2.5 - 4.5 mg/dL    Albumin 2.6 (L) 3.5 - 5.0 g/dL    Anion Gap 10 <=19 mmol/L   POCT GLUCOSE   Result Value Ref Range    POCT Glucose 230 (H) 74 - 99 mg/dL        A/P:  perineal abscess has since drained, discussed prevention in detail  Kidney stone, discussed strategies for treatment as outpatient, will fu in our office 2-3 weeks w nicole, " instructions to schedule provided w his wife

## 2024-08-16 NOTE — DISCHARGE SUMMARY
Discharge Diagnosis  Acute cystitis without hematuria    Issues Requiring Follow-Up  Blood glucose management   Kidney stone     Discharge Meds     Your medication list        START taking these medications        Instructions Last Dose Given Next Dose Due   ciprofloxacin 750 mg tablet  Commonly known as: Cipro      Take 1 tablet (750 mg) by mouth every 12 hours for 20 doses.       nystatin 100,000 unit/gram powder  Commonly known as: Mycostatin      Apply 1 Application topically 2 times a day.              CHANGE how you take these medications        Instructions Last Dose Given Next Dose Due   allopurinol 300 mg tablet  Commonly known as: Zyloprim  What changed: when to take this      TAKE 1 TABLET DAILY       metFORMIN 500 mg tablet  Commonly known as: Glucophage  What changed: Another medication with the same name was removed. Continue taking this medication, and follow the directions you see here.      TAKE 2 TABLETS 2 TIMES     DAILY WITH MEALS              CONTINUE taking these medications        Instructions Last Dose Given Next Dose Due   amLODIPine 5 mg tablet  Commonly known as: Norvasc      TAKE 1 TABLET ONCE DAILY       aspirin 81 mg EC tablet           atorvastatin 10 mg tablet  Commonly known as: Lipitor      Take 1 tablet (10 mg) by mouth once daily at bedtime.       blood sugar diagnostic strip           busPIRone 10 mg tablet  Commonly known as: Buspar      TAKE 1 TABLET TWICE A DAY       cetirizine 10 mg tablet  Commonly known as: ZyrTEC           cholecalciferol 25 MCG (1000 UT) tablet  Commonly known as: Vitamin D-3           fluticasone 50 mcg/actuation nasal spray  Commonly known as: Flonase      INSTILL 1 SPRAY INTO EACH NOSTRIL ONE TIME DAILY AS NEEDED       Klor-Con M10 10 mEq ER tablet  Generic drug: potassium chloride CR      TAKE 1 TABLET 2 TIMES DAILYWITH FOOD       MEGARED OMEGA-3 KRILL OIL ORAL           metoprolol succinate XL 50 mg 24 hr tablet  Commonly known as: Toprol-XL       TAKE 2 AND 1/2 TABLETS ONCEDAILY       miscellaneous medical supply misc           MOVE FREE [facility] ORAL           multivitamin with minerals iron-free  Commonly known as: Centrum Silver           Tresiba FlexTouch U-100 100 unit/mL (3 mL) injection  Generic drug: insulin degludec      INJECT 70 UNITS SUBCUTANEOUSLY ONE TIME DAILY AS DIRECTED       Vitamin B-12 1,000 mcg tablet  Generic drug: cyanocobalamin                  STOP taking these medications      hydroCHLOROthiazide 50 mg tablet  Commonly known as: HYDRODiuril        lisinopril 40 mg tablet                  Where to Get Your Medications        These medications were sent to [id number] [address] [address]      Phone: [phone]   ciprofloxacin 750 mg tablet  nystatin 100,000 unit/gram powder       Information about where to get these medications is not yet available    Ask your nurse or doctor about these medications  atorvastatin 10 mg tablet         Test Results Pending At Discharge  Pending Labs       Order Current Status    Extra Urine Gray Tube Collected (08/12/24 5680)    Urinalysis with Reflex Culture and Microscopic In process            Hospital Course   Presented to ER 8/12 with fevers, chills, shortness of breath and hypoxia. Originally thought to have pneumonia, but evaluation revealed UTI and bacteremia. Sepsis protocol started and ID consulted. IV antibiotics given. Patient hyperglycemic on arrival and throughout hospital stay in spite of treatment. Recommended outpatient evaluation by endocrinology. Renal function deteriorated, and nephrology was consulted. Patient found to have non-obstructive kidney stones and scrotal abscess as well. Urology consulted. Abscess spontaneously drained and CT scan of abdomen revealed no hydronephrosis or concern for obstruction. WBC returned to normal quickly and patient has now been afebrile for about 36 hours. Renal function returned  to normal. Cleared for discharge by consultants with oral antibiotics per ID recommendations and follow up with PCP, urology and endocrinology recommended.     Pertinent Physical Exam At Time of Discharge  Physical Exam  Constitutional:       Appearance: Normal appearance.   HENT:      Head: Normocephalic and atraumatic.      Mouth/Throat:      Mouth: Mucous membranes are moist.      Pharynx: Oropharynx is clear.   Eyes:      Extraocular Movements: Extraocular movements intact.      Conjunctiva/sclera: Conjunctivae normal.      Pupils: Pupils are equal, round, and reactive to light.   Cardiovascular:      Rate and Rhythm: Normal rate and regular rhythm.      Pulses: Normal pulses.      Heart sounds: Normal heart sounds.   Pulmonary:      Effort: Pulmonary effort is normal.      Breath sounds: Normal breath sounds.   Abdominal:      General: Bowel sounds are normal.      Palpations: Abdomen is soft.      Tenderness: There is no abdominal tenderness.   Musculoskeletal:         General: Normal range of motion.      Cervical back: Normal range of motion and neck supple.   Skin:     General: Skin is warm and dry.      Capillary Refill: Capillary refill takes less than 2 seconds.   Neurological:      General: No focal deficit present.      Mental Status: He is alert and oriented to person, place, and time.   Psychiatric:         Mood and Affect: Mood normal.         Behavior: Behavior normal.         Outpatient Follow-Up  Future Appointments   Date Time Provider Department Center   8/22/2024 11:45 AM Micheline Barnett MD [id number] [facility]   9/9/2024  2:00 PM iMcheline Barnett MD [id number] [facility]         JOANNA Roque-CNP

## 2024-08-16 NOTE — CARE PLAN
Pt. To be weaned off O2 throughout the night. Incentive spirometer order RT to education and review with pt.   Problem: Discharge Planning  Goal: Discharge to home or other facility with appropriate resources  Outcome: Progressing  Flowsheets (Taken 8/15/2024 2157)  Discharge to home or other facility with appropriate resources: Identify barriers to discharge with patient and caregiver

## 2024-08-19 ENCOUNTER — PATIENT OUTREACH (OUTPATIENT)
Dept: PRIMARY CARE | Facility: CLINIC | Age: 54
End: 2024-08-19
Payer: COMMERCIAL

## 2024-08-19 NOTE — PROGRESS NOTES
Discharge Facility: [facility]  Discharge Diagnosis: Acute cystitis without hematuria   Admission Date: 8/13/24  Discharge Date: 8/16/24    PCP Appointment Date: 8/22/24  Specialist Appointment Date: Unknown  Hospital Encounter and Summary Linked: Yes    See discharge assessment below for further details    Engagement  Call Start Time: 1046 (8/19/2024 10:49 AM)    Medications  Medications reviewed with patient/caregiver?: Yes (8/19/2024 10:49 AM)  Is the patient having any side effects they believe may be caused by any medication additions or changes?: No (8/19/2024 10:49 AM)  Does the patient have all medications ordered at discharge?: Yes (8/19/2024 10:49 AM)  Care Management Interventions: Provided patient education (8/19/2024 10:49 AM)  Prescription Comments: pt sent home with script (8/19/2024 10:49 AM)  Is the patient taking all medications as directed (includes completed medication regime)?: Yes (8/19/2024 10:49 AM)  Care Management Interventions: Provided patient education (8/19/2024 10:49 AM)  Medication Comments: Pt denies problems obtaining or affording medication (8/19/2024 10:49 AM)    Appointments  Does the patient have a primary care provider?: Yes (8/19/2024 10:49 AM)  Care Management Interventions: Verified appointment date/time/provider (8/19/2024 10:49 AM)  Has the patient kept scheduled appointments due by today?: Yes (8/19/2024 10:49 AM)  Care Management Interventions: Advised patient to keep appointment (8/19/2024 10:49 AM)    Self Management  What is the home health agency?: N/A (8/19/2024 10:49 AM)  Has home health visited the patient within 72 hours of discharge?: Not applicable (8/19/2024 10:49 AM)    Patient Teaching  Does the patient have access to their discharge instructions?: Yes (8/19/2024 10:49 AM)  Care Management Interventions: Reviewed instructions with patient (8/19/2024 10:49 AM)  What is the patient's perception of their health status since discharge?:  Please have pt schedule an appt. He does not have a past medical history of asthma. I would like to see him in order to give him an inhaler.    Improving (8/19/2024 10:49 AM)  Is the patient/caregiver able to teach back the hierarchy of who to call/visit for symptoms/problems? PCP, Specialist, Home Health nurse, Urgent Care, ED, 911: Yes (8/19/2024 10:49 AM)    Wrap Up  Wrap Up Additional Comments: This CM spoke with pt via phone. Pt reports doing well at home since discharge. New meds reviewed. Pt denies CP and SOB. Pt aware of my availability for non-emergent concerns. Contact info provided to patient (8/19/2024 10:49 AM)      Taiwo Cochran LPN

## 2024-08-22 ENCOUNTER — OFFICE VISIT (OUTPATIENT)
Dept: PRIMARY CARE | Facility: CLINIC | Age: 54
End: 2024-08-22
Payer: COMMERCIAL

## 2024-08-22 VITALS
OXYGEN SATURATION: 95 % | BODY MASS INDEX: 54.81 KG/M2 | WEIGHT: 315 LBS | SYSTOLIC BLOOD PRESSURE: 148 MMHG | DIASTOLIC BLOOD PRESSURE: 70 MMHG | TEMPERATURE: 100 F | HEART RATE: 62 BPM

## 2024-08-22 DIAGNOSIS — E78.2 MIXED HYPERLIPIDEMIA: ICD-10-CM

## 2024-08-22 DIAGNOSIS — Z09 HOSPITAL DISCHARGE FOLLOW-UP: Primary | ICD-10-CM

## 2024-08-22 DIAGNOSIS — N20.0 NEPHROLITHIASIS: ICD-10-CM

## 2024-08-22 DIAGNOSIS — G47.33 OSA (OBSTRUCTIVE SLEEP APNEA): ICD-10-CM

## 2024-08-22 DIAGNOSIS — Z79.4 TYPE 2 DIABETES MELLITUS WITH STAGE 3A CHRONIC KIDNEY DISEASE, WITH LONG-TERM CURRENT USE OF INSULIN (MULTI): ICD-10-CM

## 2024-08-22 DIAGNOSIS — E11.22 TYPE 2 DIABETES MELLITUS WITH STAGE 3A CHRONIC KIDNEY DISEASE, WITH LONG-TERM CURRENT USE OF INSULIN (MULTI): ICD-10-CM

## 2024-08-22 DIAGNOSIS — Z12.5 SCREENING FOR PROSTATE CANCER: ICD-10-CM

## 2024-08-22 DIAGNOSIS — F41.9 ANXIETY: ICD-10-CM

## 2024-08-22 DIAGNOSIS — I10 ESSENTIAL HYPERTENSION: ICD-10-CM

## 2024-08-22 DIAGNOSIS — N18.31 TYPE 2 DIABETES MELLITUS WITH STAGE 3A CHRONIC KIDNEY DISEASE, WITH LONG-TERM CURRENT USE OF INSULIN (MULTI): ICD-10-CM

## 2024-08-22 DIAGNOSIS — B37.2 YEAST INFECTION OF THE SKIN: ICD-10-CM

## 2024-08-22 DIAGNOSIS — R09.81 NASAL CONGESTION: ICD-10-CM

## 2024-08-22 DIAGNOSIS — I10 PRIMARY HYPERTENSION: ICD-10-CM

## 2024-08-22 PROCEDURE — RXMED WILLOW AMBULATORY MEDICATION CHARGE

## 2024-08-22 PROCEDURE — 3077F SYST BP >= 140 MM HG: CPT | Performed by: INTERNAL MEDICINE

## 2024-08-22 PROCEDURE — 3078F DIAST BP <80 MM HG: CPT | Performed by: INTERNAL MEDICINE

## 2024-08-22 PROCEDURE — 99496 TRANSJ CARE MGMT HIGH F2F 7D: CPT | Performed by: INTERNAL MEDICINE

## 2024-08-22 PROCEDURE — 3060F POS MICROALBUMINURIA REV: CPT | Performed by: INTERNAL MEDICINE

## 2024-08-22 RX ORDER — AMLODIPINE BESYLATE 10 MG/1
5 TABLET ORAL DAILY
Qty: 90 TABLET | Refills: 1 | Status: SHIPPED | OUTPATIENT
Start: 2024-08-22

## 2024-08-22 RX ORDER — GUAIFENESIN 600 MG/1
1200 TABLET, EXTENDED RELEASE ORAL 2 TIMES DAILY
COMMUNITY

## 2024-08-22 RX ORDER — FLUTICASONE PROPIONATE 50 MCG
SPRAY, SUSPENSION (ML) NASAL
Qty: 48 G | Refills: 3 | Status: SHIPPED | OUTPATIENT
Start: 2024-08-22 | End: 2025-08-22

## 2024-08-22 ASSESSMENT — ENCOUNTER SYMPTOMS
OCCASIONAL FEELINGS OF UNSTEADINESS: 0
LOSS OF SENSATION IN FEET: 0
DEPRESSION: 0

## 2024-08-22 ASSESSMENT — PATIENT HEALTH QUESTIONNAIRE - PHQ9
1. LITTLE INTEREST OR PLEASURE IN DOING THINGS: NOT AT ALL
SUM OF ALL RESPONSES TO PHQ9 QUESTIONS 1 AND 2: 0
2. FEELING DOWN, DEPRESSED OR HOPELESS: NOT AT ALL

## 2024-08-22 ASSESSMENT — PAIN SCALES - GENERAL: PAINLEVEL: 0-NO PAIN

## 2024-08-22 NOTE — PROGRESS NOTES
"   Patient: Wilber Pennington \"Wong\"  : 1970  PCP: Micheline Barnett MD  MRN: [id number]  Program: Transitional Care Management  Status: Enrolled  Effective Dates: 2024 - present  Responsible Staff: Taiwo Cochran LPN  Social Determinants to be Addressed: Physical Activity, Social Connections, Stress         Wilber Pennington \"Wong\" is a 54 y.o. male presenting today for follow-up after being discharged from the hospital 6 days ago. The main problem requiring admission was UTI, scrotal abscess. The discharge summary and/or Transitional Care Management documentation was reviewed. Medication reconciliation was performed as indicated via the \"Carlos as Reviewed\" timestamp.     Wilber Pennington \"Wong\" was contacted by Transitional Care Management services two days after his discharge. This encounter and supporting documentation was reviewed.    Hospital Course   Presented to ER  with fevers, chills, shortness of breath and hypoxia. Originally thought to have pneumonia, but evaluation revealed UTI and bacteremia. Sepsis protocol started and ID consulted. IV antibiotics given. Patient hyperglycemic on arrival and throughout hospital stay in spite of treatment. Recommended outpatient evaluation by endocrinology. Renal function deteriorated, and nephrology was consulted. Patient found to have non-obstructive kidney stones and scrotal abscess as well. Urology consulted. Abscess spontaneously drained and CT scan of abdomen revealed no hydronephrosis or concern for obstruction. WBC returned to normal quickly and patient has now been afebrile for about 36 hours. Renal function returned to normal. Cleared for discharge by consultants with oral antibiotics per ID recommendations and follow up with PCP, urology and endocrinology recommended.      Has history of hypertension, hyperlipidemia, obstructive sleep apnea on CPAP, gout, anxiety.  Has not followed up with me in more than 1 year         H/O diabetes mellitus- stated " compliant with medications. Doesn;t Checks blood sugars frequently, checks once or twice a month. FBS last time was 211. Stopped rebylsus early 2022, as it was making his urine color tan. Denied any hematuria. Denied any hypoglycemic episodes. Started Trulicity in mid 2022.  Which she stopped early 2024, as co-pay was too high        H/O gout- symptoms well controlled on current meds.         H/O KARINA on CPAP- compliant with CPAP machine. Uses it every night. Sees Dr. Shook.         H/O- stress, anxiety. He attributed this to his work. Stated people's problems affect him to a point where he takes personal days off from work. Stated he used to see a psychologist many years ago.  Hydrochlorothiazide and lisinopril were stopped during hospitalization due to kidney function  Checking blood sugars regularly since hospital discharge, FBS ranging from 109- 300  He is accompanied by his wife today  Still taking ciprofloxacin for 5 more days left  Rash in groin and better  Denied any fever, chills    Review of Systems   Constitutional:  Negative for fatigue and unexpected weight change.   HENT:  Negative for congestion, ear pain, sinus pain and sore throat.    Respiratory:  Negative for cough, shortness of breath and wheezing.    Cardiovascular:  Negative for chest pain, palpitations and leg swelling.   Gastrointestinal:  Negative for abdominal pain, blood in stool and constipation.   Endocrine: Negative for polydipsia, polyphagia and polyuria.   Genitourinary:  Negative for hematuria and urgency.   Musculoskeletal:  Negative for arthralgias and joint swelling.   Skin:  Positive for rash.   Neurological:  Negative for tremors, syncope, weakness and numbness.   Psychiatric/Behavioral:  Negative for behavioral problems. The patient is not nervous/anxious.        /70 (BP Location: Left arm, Patient Position: Sitting, BP Cuff Size: Adult)   Pulse 62   Temp 37.8 °C (100 °F) (Temporal)   Wt (!) 173 kg (382 lb)   SpO2  "95%   BMI 54.81 kg/m²     Physical Exam  Constitutional:       Appearance: Normal appearance. He is obese.   HENT:      Head: Normocephalic and atraumatic.   Eyes:      Extraocular Movements: Extraocular movements intact.      Conjunctiva/sclera: Conjunctivae normal.   Cardiovascular:      Rate and Rhythm: Normal rate and regular rhythm.      Heart sounds: No murmur heard.  Pulmonary:      Effort: Pulmonary effort is normal. No respiratory distress.      Breath sounds: Normal breath sounds.   Abdominal:      General: Abdomen is flat. Bowel sounds are normal.      Palpations: Abdomen is soft.   Musculoskeletal:      Right lower leg: No edema.      Left lower leg: No edema.   Lymphadenopathy:      Cervical: No cervical adenopathy.   Skin:     Comments: Scrotal rash, swelling resolved  Erythematous rash under pannus, intergluteal cleft 2 cm linear erythematous rash, no discharge   Neurological:      Mental Status: He is alert and oriented to person, place, and time.      Cranial Nerves: No cranial nerve deficit.   Psychiatric:         Mood and Affect: Mood normal.         The complexity of medical decision making for this patient's transitional care is high.    Assessment/Plan       Wilber \"Wong\" was seen today for hospital d/c-[facility].  Diagnoses and all orders for this visit:  Hospital discharge follow-up (Primary)  Nasal congestion  -     fluticasone (Flonase) 50 mcg/actuation nasal spray; INSTILL 1 SPRAY INTO EACH NOSTRIL ONE TIME DAILY AS NEEDED  Type 2 diabetes mellitus with stage 3a chronic kidney disease, with long-term current use of insulin (Multi)  -     Hemoglobin A1C; Future  -     Albumin-Creatinine Ratio, Urine Random; Future  -     Referral to Endocrinology; Future  Essential hypertension  -     amLODIPine (Norvasc) 10 mg tablet; Take 0.5 tablets (5 mg) by mouth once daily.  Mixed hyperlipidemia  -     Lipid Panel; Future  Anxiety  -     TSH with reflex to Free T4 if abnormal; Future  KARINA (obstructive sleep " "apnea)  Primary hypertension  -     CBC and Auto Differential; Future  -     Comprehensive Metabolic Panel; Future  Yeast infection of the skin  Screening for prostate cancer  -     Prostate Specific Antigen, Screen; Future  Nephrolithiasis  -     Referral to Urology; Future      Reviewed ER records  Increased amlodipine to 10 from 5 mg  Advised to increase Tresiba to 75 mg from 70 milligrams daily    Lab Results   Component Value Date    HGBA1C 12.8 (H) 06/29/2023     Lab Results   Component Value Date    WBC 9.8 08/16/2024    HGB 10.0 (L) 08/16/2024    HCT 30.2 (L) 08/16/2024    MCV 81 08/16/2024     08/16/2024     Lab Results   Component Value Date    GLUCOSE 242 (H) 08/16/2024    CALCIUM 8.5 08/16/2024     (L) 08/16/2024    K 3.4 08/16/2024    CO2 22 (L) 08/16/2024    CL 98 08/16/2024    BUN 31 (H) 08/16/2024    CREATININE 1.30 08/16/2024     Lab Results   Component Value Date    CHOL 119 (L) 06/29/2023    CHOL 141 11/12/2021    CHOL 146 01/04/2021     Lab Results   Component Value Date    HDL 43 06/29/2023    HDL 43 11/12/2021    HDL 41 01/04/2021     Lab Results   Component Value Date    LDLCALC 44 (L) 06/29/2023    LDLCALC 67 11/12/2021    LDLCALC 64 (L) 01/04/2021     Lab Results   Component Value Date    TRIG 162 (H) 06/29/2023    TRIG 154 (H) 11/12/2021    TRIG 205 (H) 01/04/2021     No components found for: \"CHOLHDL\"        Current Outpatient Medications   Medication Instructions    allopurinol (ZYLOPRIM) 300 mg, oral, Daily    amLODIPine (NORVASC) 5 mg, oral, Daily    aspirin 81 mg, oral, Daily    atorvastatin (LIPITOR) 10 mg, oral, Nightly    busPIRone (BUSPAR) 10 mg, oral, 2 times daily    cholecalciferol (VITAMIN D-3) 1,000 Units, oral, 2 times daily, as directed Orally    ciprofloxacin (CIPRO) 750 mg, oral, Every 12 hours    cyanocobalamin (VITAMIN B-12) 1,000 mcg, oral, Daily, For 30 day(s)    fluticasone (Flonase) 50 mcg/actuation nasal spray INSTILL 1 SPRAY INTO EACH NOSTRIL ONE TIME " DAILY AS NEEDED    glucosam/chond/hyalu/CF borate (MOVE FREE JOINT HEALTH ORAL) 1 tablet, oral, 2 times daily    guaiFENesin (MUCINEX) 1,200 mg, oral, 2 times daily, Do not crush, chew, or split.    insulin degludec (Tresiba FlexTouch U-100) 100 unit/mL (3 mL) injection INJECT 70 UNITS SUBCUTANEOUSLY ONE TIME DAILY AS DIRECTED    krill/om-3/dha/epa/phospho/ast (MEGARED OMEGA-3 KRILL OIL ORAL) 500 mg, oral, Daily    L.acid/B.animalis,bifidum/FOS (PROBIOTIC COMPLEX ORAL) oral    metFORMIN (GLUCOPHAGE) 1,000 mg, oral, 2 times daily (morning and late afternoon)    metoprolol succinate XL (Toprol-XL) 50 mg 24 hr tablet TAKE 2 AND 1/2 TABLETS ONCEDAILY    miscellaneous medical supply misc CPAP mask and supplies; 14 cmH2O with HH and a Respironics dreamwear medium FFM    multivitamin with minerals iron-free (Centrum Silver) 1 tablet, oral, Daily    nystatin (Mycostatin) 100,000 unit/gram powder 1 Application, Topical, 2 times daily    potassium chloride CR (Klor-Con M10) 10 mEq ER tablet 10 mEq, oral, 2 times daily after meals

## 2024-08-23 ENCOUNTER — PHARMACY VISIT (OUTPATIENT)
Dept: PHARMACY | Facility: CLINIC | Age: 54
End: 2024-08-23
Payer: MEDICARE

## 2024-08-23 DIAGNOSIS — E11.22 TYPE 2 DIABETES MELLITUS WITH STAGE 3A CHRONIC KIDNEY DISEASE, WITH LONG-TERM CURRENT USE OF INSULIN (MULTI): Primary | ICD-10-CM

## 2024-08-23 DIAGNOSIS — Z79.4 TYPE 2 DIABETES MELLITUS WITH STAGE 3A CHRONIC KIDNEY DISEASE, WITH LONG-TERM CURRENT USE OF INSULIN (MULTI): Primary | ICD-10-CM

## 2024-08-23 DIAGNOSIS — N18.31 TYPE 2 DIABETES MELLITUS WITH STAGE 3A CHRONIC KIDNEY DISEASE, WITH LONG-TERM CURRENT USE OF INSULIN (MULTI): Primary | ICD-10-CM

## 2024-08-23 ASSESSMENT — ENCOUNTER SYMPTOMS
WEAKNESS: 0
BLOOD IN STOOL: 0
SINUS PAIN: 0
WHEEZING: 0
COUGH: 0
SORE THROAT: 0
HEMATURIA: 0
PALPITATIONS: 0
JOINT SWELLING: 0
NUMBNESS: 0
POLYDIPSIA: 0
POLYPHAGIA: 0
ABDOMINAL PAIN: 0
NERVOUS/ANXIOUS: 0
CONSTIPATION: 0
TREMORS: 0
FATIGUE: 0
UNEXPECTED WEIGHT CHANGE: 0
SHORTNESS OF BREATH: 0
ARTHRALGIAS: 0

## 2024-08-29 ENCOUNTER — PATIENT OUTREACH (OUTPATIENT)
Dept: PRIMARY CARE | Facility: CLINIC | Age: 54
End: 2024-08-29
Payer: COMMERCIAL

## 2024-08-29 NOTE — PROGRESS NOTES
Unable to reach patient for call back after patient's follow up appointment with PCP.   LVM with call back number for patient to call if needed   If no voicemail available call attempts x 2 were made to contact the patient to assist with any questions or concerns patient may have.    Taiwo Cochran LPN

## 2024-09-02 DIAGNOSIS — F41.9 ANXIETY: ICD-10-CM

## 2024-09-02 DIAGNOSIS — E11.9 TYPE 2 DIABETES MELLITUS WITHOUT COMPLICATION, WITHOUT LONG-TERM CURRENT USE OF INSULIN (MULTI): ICD-10-CM

## 2024-09-02 DIAGNOSIS — M1A.0710 CHRONIC GOUT OF RIGHT FOOT, UNSPECIFIED CAUSE: ICD-10-CM

## 2024-09-03 RX ORDER — BUSPIRONE HYDROCHLORIDE 10 MG/1
10 TABLET ORAL 2 TIMES DAILY
Qty: 180 TABLET | Refills: 0 | Status: SHIPPED | OUTPATIENT
Start: 2024-09-03

## 2024-09-03 RX ORDER — ALLOPURINOL 300 MG/1
300 TABLET ORAL NIGHTLY
Qty: 90 TABLET | Refills: 1 | Status: SHIPPED | OUTPATIENT
Start: 2024-09-03

## 2024-09-03 RX ORDER — METFORMIN HYDROCHLORIDE 500 MG/1
1000 TABLET ORAL
Qty: 360 TABLET | Refills: 1 | Status: SHIPPED | OUTPATIENT
Start: 2024-09-03

## 2024-09-09 ENCOUNTER — APPOINTMENT (OUTPATIENT)
Dept: PRIMARY CARE | Facility: CLINIC | Age: 54
End: 2024-09-09
Payer: COMMERCIAL

## 2024-09-09 ENCOUNTER — TELEPHONE (OUTPATIENT)
Dept: PRIMARY CARE | Facility: CLINIC | Age: 54
End: 2024-09-09
Payer: COMMERCIAL

## 2024-09-09 ENCOUNTER — PHARMACY VISIT (OUTPATIENT)
Dept: PHARMACY | Facility: CLINIC | Age: 54
End: 2024-09-09
Payer: COMMERCIAL

## 2024-09-09 DIAGNOSIS — E11.9 TYPE 2 DIABETES MELLITUS WITHOUT COMPLICATION, WITH LONG-TERM CURRENT USE OF INSULIN (MULTI): ICD-10-CM

## 2024-09-09 DIAGNOSIS — Z79.4 TYPE 2 DIABETES MELLITUS WITHOUT COMPLICATION, WITH LONG-TERM CURRENT USE OF INSULIN (MULTI): ICD-10-CM

## 2024-09-09 PROCEDURE — RXMED WILLOW AMBULATORY MEDICATION CHARGE

## 2024-09-19 ENCOUNTER — PATIENT OUTREACH (OUTPATIENT)
Dept: PRIMARY CARE | Facility: CLINIC | Age: 54
End: 2024-09-19
Payer: COMMERCIAL

## 2024-09-19 NOTE — PROGRESS NOTES
Unable to reach patient for discharge follow up call.   LVM with call back number for patient to call if needed   If no voicemail available call attempts x 2 were made to contact the patient to assist with any questions or concerns patient may have.    Taiwo Cochran LPN

## 2024-09-26 ENCOUNTER — DOCUMENTATION (OUTPATIENT)
Dept: PRIMARY CARE | Facility: CLINIC | Age: 54
End: 2024-09-26
Payer: COMMERCIAL

## 2024-09-26 LAB — HEMOGLOBIN A1C/HEMOGLOBIN TOTAL IN BLOOD EXTERNAL: 10 %

## 2024-09-30 ENCOUNTER — OFFICE VISIT (OUTPATIENT)
Dept: PRIMARY CARE | Facility: CLINIC | Age: 54
End: 2024-09-30
Payer: COMMERCIAL

## 2024-09-30 VITALS
TEMPERATURE: 99.5 F | DIASTOLIC BLOOD PRESSURE: 76 MMHG | BODY MASS INDEX: 55.24 KG/M2 | SYSTOLIC BLOOD PRESSURE: 152 MMHG | OXYGEN SATURATION: 92 % | WEIGHT: 315 LBS | HEART RATE: 73 BPM

## 2024-09-30 DIAGNOSIS — Z23 ENCOUNTER FOR IMMUNIZATION: ICD-10-CM

## 2024-09-30 DIAGNOSIS — E78.2 MIXED HYPERLIPIDEMIA: ICD-10-CM

## 2024-09-30 DIAGNOSIS — N18.31 TYPE 2 DIABETES MELLITUS WITH STAGE 3A CHRONIC KIDNEY DISEASE, WITH LONG-TERM CURRENT USE OF INSULIN (MULTI): Primary | ICD-10-CM

## 2024-09-30 DIAGNOSIS — E11.22 TYPE 2 DIABETES MELLITUS WITH STAGE 3A CHRONIC KIDNEY DISEASE, WITH LONG-TERM CURRENT USE OF INSULIN (MULTI): Primary | ICD-10-CM

## 2024-09-30 DIAGNOSIS — I10 ESSENTIAL HYPERTENSION: ICD-10-CM

## 2024-09-30 DIAGNOSIS — Z79.4 TYPE 2 DIABETES MELLITUS WITH STAGE 3A CHRONIC KIDNEY DISEASE, WITH LONG-TERM CURRENT USE OF INSULIN (MULTI): Primary | ICD-10-CM

## 2024-09-30 DIAGNOSIS — E87.6 HYPOKALEMIA: ICD-10-CM

## 2024-09-30 DIAGNOSIS — I10 PRIMARY HYPERTENSION: ICD-10-CM

## 2024-09-30 DIAGNOSIS — F41.9 ANXIETY: ICD-10-CM

## 2024-09-30 PROBLEM — J18.9 PNEUMONIA DUE TO INFECTIOUS ORGANISM, UNSPECIFIED LATERALITY, UNSPECIFIED PART OF LUNG: Status: RESOLVED | Noted: 2024-08-12 | Resolved: 2024-09-30

## 2024-09-30 PROBLEM — N30.00 ACUTE CYSTITIS WITHOUT HEMATURIA: Chronic | Status: RESOLVED | Noted: 2024-08-14 | Resolved: 2024-09-30

## 2024-09-30 PROBLEM — B37.2 YEAST INFECTION OF THE SKIN: Status: RESOLVED | Noted: 2024-08-12 | Resolved: 2024-09-30

## 2024-09-30 PROBLEM — J18.9 PNEUMONIA: Status: RESOLVED | Noted: 2024-08-12 | Resolved: 2024-09-30

## 2024-09-30 PROCEDURE — 90471 IMMUNIZATION ADMIN: CPT | Performed by: INTERNAL MEDICINE

## 2024-09-30 PROCEDURE — 3046F HEMOGLOBIN A1C LEVEL >9.0%: CPT | Performed by: INTERNAL MEDICINE

## 2024-09-30 PROCEDURE — 90656 IIV3 VACC NO PRSV 0.5 ML IM: CPT | Performed by: INTERNAL MEDICINE

## 2024-09-30 PROCEDURE — 3078F DIAST BP <80 MM HG: CPT | Performed by: INTERNAL MEDICINE

## 2024-09-30 PROCEDURE — 3060F POS MICROALBUMINURIA REV: CPT | Performed by: INTERNAL MEDICINE

## 2024-09-30 PROCEDURE — 99214 OFFICE O/P EST MOD 30 MIN: CPT | Performed by: INTERNAL MEDICINE

## 2024-09-30 PROCEDURE — 4010F ACE/ARB THERAPY RXD/TAKEN: CPT | Performed by: INTERNAL MEDICINE

## 2024-09-30 PROCEDURE — 3077F SYST BP >= 140 MM HG: CPT | Performed by: INTERNAL MEDICINE

## 2024-09-30 RX ORDER — LOSARTAN POTASSIUM 50 MG/1
50 TABLET ORAL DAILY
Qty: 100 TABLET | Refills: 1 | Status: SHIPPED | OUTPATIENT
Start: 2024-09-30 | End: 2025-04-18

## 2024-09-30 RX ORDER — AMLODIPINE BESYLATE 10 MG/1
10 TABLET ORAL DAILY
Qty: 90 TABLET | Refills: 1 | Status: SHIPPED | OUTPATIENT
Start: 2024-09-30

## 2024-09-30 ASSESSMENT — ENCOUNTER SYMPTOMS
LOSS OF SENSATION IN FEET: 0
DEPRESSION: 0
OCCASIONAL FEELINGS OF UNSTEADINESS: 0

## 2024-09-30 ASSESSMENT — PATIENT HEALTH QUESTIONNAIRE - PHQ9
2. FEELING DOWN, DEPRESSED OR HOPELESS: NOT AT ALL
SUM OF ALL RESPONSES TO PHQ9 QUESTIONS 1 AND 2: 0
1. LITTLE INTEREST OR PLEASURE IN DOING THINGS: NOT AT ALL

## 2024-09-30 ASSESSMENT — PAIN SCALES - GENERAL: PAINLEVEL: 0-NO PAIN

## 2024-09-30 NOTE — PROGRESS NOTES
Subjective   Patient ID: Wong Pennington is a 54 y.o. male who presents for Follow-up.    HPI      Has history of hypertension, hyperlipidemia, obstructive sleep apnea on CPAP, gout, anxiety.           H/O diabetes mellitus- Been checking blood sugars daily since ER visit, FBS range from 110-150  Stopped rebylsus early 2022, as it was making his urine color tan. Denied any hematuria. Denied any hypoglycemic episodes. Started Trulicity in mid 2022.  Which she stopped early 2024, as co-pay was too high            H/O gout- symptoms well controlled on current meds.         H/O KARINA on CPAP- compliant with CPAP machine. Uses it every night. Sees Dr. Shook.         H/O- stress, anxiety. He attributed this to his work. Stated people's problems affect him to a point where he takes personal days off from work. Stated he used to see a psychologist many years ago.    Interim history 08/12     Presented to ER 8/12 with fevers, chills, shortness of breath and hypoxia. Originally thought to have pneumonia, but evaluation revealed UTI and bacteremia. Sepsis protocol started and ID consulted. IV antibiotics given. Patient hyperglycemic on arrival and throughout hospital stay in spite of treatment. Recommended outpatient evaluation by endocrinology. Renal function deteriorated, and nephrology was consulted. Patient found to have non-obstructive kidney stones and scrotal abscess as well. Urology consulted. Abscess spontaneously drained and CT scan of abdomen revealed no hydronephrosis or concern for obstruction. WBC returned to normal quickly and patient has now been afebrile for about 36 hours. Renal function returned to normal. Cleared for discharge by consultants with oral antibiotics per ID recommendations and follow up with PCP, urology and endocrinology recommended.     Hydrochlorothiazide and lisinopril were stopped during hospitalization due to kidney function    Review of Systems   Constitutional:  Negative for fatigue and  "unexpected weight change.   HENT:  Negative for congestion, ear pain, sinus pain and sore throat.    Respiratory:  Negative for cough, shortness of breath and wheezing.    Cardiovascular:  Negative for chest pain, palpitations and leg swelling.   Gastrointestinal:  Negative for abdominal pain, blood in stool and constipation.   Endocrine: Negative for polydipsia, polyphagia and polyuria.   Genitourinary:  Negative for hematuria and urgency.   Musculoskeletal:  Negative for arthralgias and joint swelling.   Skin:  Negative for rash.   Neurological:  Negative for tremors, syncope, weakness and numbness.   Psychiatric/Behavioral:  Positive for behavioral problems. The patient is not nervous/anxious.        Objective   /76 (BP Location: Left arm, Patient Position: Sitting, BP Cuff Size: Adult)   Pulse 73   Temp 37.5 °C (99.5 °F) (Temporal)   Wt (!) 175 kg (385 lb)   SpO2 92%   BMI 55.24 kg/m²     Physical Exam  Constitutional:       Appearance: Normal appearance. He is obese.   HENT:      Head: Normocephalic and atraumatic.   Eyes:      Extraocular Movements: Extraocular movements intact.      Conjunctiva/sclera: Conjunctivae normal.   Cardiovascular:      Rate and Rhythm: Normal rate and regular rhythm.      Heart sounds: No murmur heard.  Pulmonary:      Effort: Pulmonary effort is normal. No respiratory distress.      Breath sounds: Normal breath sounds.   Abdominal:      General: Abdomen is flat. Bowel sounds are normal.      Palpations: Abdomen is soft.   Musculoskeletal:      Right lower leg: No edema.      Left lower leg: No edema.      Comments: Chronic venous stasis changes bilaterally   Lymphadenopathy:      Cervical: No cervical adenopathy.   Neurological:      Mental Status: He is alert and oriented to person, place, and time.      Cranial Nerves: No cranial nerve deficit.   Psychiatric:         Mood and Affect: Mood normal.         Assessment/Plan        Wilber \"Wong\" was seen today for " follow-up.  Diagnoses and all orders for this visit:  Type 2 diabetes mellitus with stage 3a chronic kidney disease, with long-term current use of insulin (Multi) (Primary)  Encounter for immunization  Primary hypertension  -     losartan (Cozaar) 50 mg tablet; Take 1 tablet (50 mg) by mouth once daily.  Essential hypertension  -     amLODIPine (Norvasc) 10 mg tablet; Take 1 tablet (10 mg) by mouth once daily.  Hypokalemia  Anxiety  Mixed hyperlipidemia  Other orders  -     Flu vaccine, trivalent, preservative free, age 6 months and greater (Fluraix/Fluzone/Flulaval)      Lab Results   Component Value Date    HGBA1C 10.0 09/25/2024        Taking amlodipine 10 mg daily which was increased during last visit  Started on losartan 50 mg daily today  Continue to check blood pressure at home, our goal blood pressure is less than 140/80 reviewed home blood sugar readings ranging from 120-170  Reviewed recent labs with patient  Continue current insulin will repeat A1c in 3 months  Recommended weight loss, advised to limit daily calories to less than 1800 malena a day      Current Outpatient Medications   Medication Instructions    allopurinol (ZYLOPRIM) 300 mg, oral, Nightly    amLODIPine (NORVASC) 10 mg, oral, Daily    aspirin 81 mg, oral, Daily    atorvastatin (LIPITOR) 10 mg, oral, Nightly    busPIRone (BUSPAR) 10 mg, oral, 2 times daily    cholecalciferol (VITAMIN D-3) 1,000 Units, oral, 2 times daily, as directed Orally    cyanocobalamin (VITAMIN B-12) 1,000 mcg, oral, Daily, For 30 day(s)    fluticasone (Flonase) 50 mcg/actuation nasal spray INSTILL 1 SPRAY INTO EACH NOSTRIL ONE TIME DAILY AS NEEDED    glucosam/chond/hyalu/CF borate (MOVE FREE JOINT HEALTH ORAL) 1 tablet, oral, 2 times daily    insulin degludec (Tresiba FlexTouch U-100) 100 unit/mL (3 mL) injection INJECT 70 UNITS SUBCUTANEOUSLY ONE TIME DAILY AS DIRECTED    krill/om-3/dha/epa/phospho/ast (MEGARED OMEGA-3 KRILL OIL ORAL) 500 mg, oral, Daily     L.acid/B.animalis,bifidum/FOS (PROBIOTIC COMPLEX ORAL) oral    losartan (COZAAR) 50 mg, oral, Daily    metFORMIN (GLUCOPHAGE) 1,000 mg, oral, 2 times daily (morning and late afternoon)    metoprolol succinate XL (Toprol-XL) 50 mg 24 hr tablet TAKE 2 AND 1/2 TABLETS ONCEDAILY    miscellaneous medical supply misc CPAP mask and supplies; 14 cmH2O with HH and a Respironics dreamwear medium FFM    multivitamin with minerals iron-free (Centrum Silver) 1 tablet, oral, Daily    nystatin (Mycostatin) 100,000 unit/gram powder 1 Application, Topical, 2 times daily    potassium chloride CR (Klor-Con M10) 10 mEq ER tablet 10 mEq, oral, 2 times daily after meals

## 2024-10-01 ASSESSMENT — ENCOUNTER SYMPTOMS
COUGH: 0
BLOOD IN STOOL: 0
WEAKNESS: 0
TREMORS: 0
POLYPHAGIA: 0
NUMBNESS: 0
JOINT SWELLING: 0
FATIGUE: 0
ARTHRALGIAS: 0
SINUS PAIN: 0
CONSTIPATION: 0
WHEEZING: 0
ABDOMINAL PAIN: 0
UNEXPECTED WEIGHT CHANGE: 0
SHORTNESS OF BREATH: 0
PALPITATIONS: 0
NERVOUS/ANXIOUS: 0
SORE THROAT: 0
POLYDIPSIA: 0
HEMATURIA: 0

## 2024-10-07 ENCOUNTER — OFFICE VISIT (OUTPATIENT)
Dept: PRIMARY CARE | Facility: CLINIC | Age: 54
End: 2024-10-07
Payer: COMMERCIAL

## 2024-10-07 VITALS
DIASTOLIC BLOOD PRESSURE: 92 MMHG | HEART RATE: 70 BPM | OXYGEN SATURATION: 94 % | BODY MASS INDEX: 45.1 KG/M2 | WEIGHT: 315 LBS | SYSTOLIC BLOOD PRESSURE: 170 MMHG | HEIGHT: 70 IN | TEMPERATURE: 99.5 F

## 2024-10-07 DIAGNOSIS — R06.2 WHEEZING: ICD-10-CM

## 2024-10-07 DIAGNOSIS — R06.02 SHORTNESS OF BREATH: ICD-10-CM

## 2024-10-07 DIAGNOSIS — J06.9 UPPER RESPIRATORY TRACT INFECTION, UNSPECIFIED TYPE: ICD-10-CM

## 2024-10-07 DIAGNOSIS — R09.81 NASAL CONGESTION: ICD-10-CM

## 2024-10-07 DIAGNOSIS — R05.1 ACUTE COUGH: Primary | ICD-10-CM

## 2024-10-07 DIAGNOSIS — I10 PRIMARY HYPERTENSION: ICD-10-CM

## 2024-10-07 LAB
POC BINAX EXPIRATION: NORMAL
POC BINAX NOW COVID SERIAL NUMBER: NORMAL
POC SARS-COV-2 AG BINAX: NORMAL

## 2024-10-07 PROCEDURE — 99213 OFFICE O/P EST LOW 20 MIN: CPT | Performed by: INTERNAL MEDICINE

## 2024-10-07 PROCEDURE — 3060F POS MICROALBUMINURIA REV: CPT | Performed by: INTERNAL MEDICINE

## 2024-10-07 PROCEDURE — 3080F DIAST BP >= 90 MM HG: CPT | Performed by: INTERNAL MEDICINE

## 2024-10-07 PROCEDURE — 3008F BODY MASS INDEX DOCD: CPT | Performed by: INTERNAL MEDICINE

## 2024-10-07 PROCEDURE — 3046F HEMOGLOBIN A1C LEVEL >9.0%: CPT | Performed by: INTERNAL MEDICINE

## 2024-10-07 PROCEDURE — 3077F SYST BP >= 140 MM HG: CPT | Performed by: INTERNAL MEDICINE

## 2024-10-07 PROCEDURE — 87811 SARS-COV-2 COVID19 W/OPTIC: CPT | Performed by: INTERNAL MEDICINE

## 2024-10-07 PROCEDURE — 4010F ACE/ARB THERAPY RXD/TAKEN: CPT | Performed by: INTERNAL MEDICINE

## 2024-10-07 PROCEDURE — RXMED WILLOW AMBULATORY MEDICATION CHARGE

## 2024-10-07 RX ORDER — METOPROLOL SUCCINATE 50 MG/1
TABLET, EXTENDED RELEASE ORAL
Qty: 225 TABLET | Refills: 1 | Status: SHIPPED | OUTPATIENT
Start: 2024-10-07

## 2024-10-07 RX ORDER — ALBUTEROL SULFATE 90 UG/1
2 INHALANT RESPIRATORY (INHALATION) EVERY 4 HOURS PRN
Qty: 18 G | Refills: 5 | Status: SHIPPED | OUTPATIENT
Start: 2024-10-07 | End: 2025-10-07

## 2024-10-07 RX ORDER — AMOXICILLIN AND CLAVULANATE POTASSIUM 875; 125 MG/1; MG/1
875 TABLET, FILM COATED ORAL 2 TIMES DAILY
Qty: 20 TABLET | Refills: 0 | Status: SHIPPED | OUTPATIENT
Start: 2024-10-07 | End: 2024-10-18

## 2024-10-07 ASSESSMENT — ENCOUNTER SYMPTOMS
DEPRESSION: 0
OCCASIONAL FEELINGS OF UNSTEADINESS: 0
LOSS OF SENSATION IN FEET: 0

## 2024-10-07 ASSESSMENT — COLUMBIA-SUICIDE SEVERITY RATING SCALE - C-SSRS
6. HAVE YOU EVER DONE ANYTHING, STARTED TO DO ANYTHING, OR PREPARED TO DO ANYTHING TO END YOUR LIFE?: NO
1. IN THE PAST MONTH, HAVE YOU WISHED YOU WERE DEAD OR WISHED YOU COULD GO TO SLEEP AND NOT WAKE UP?: NO
2. HAVE YOU ACTUALLY HAD ANY THOUGHTS OF KILLING YOURSELF?: NO

## 2024-10-07 ASSESSMENT — PAIN SCALES - GENERAL: PAINLEVEL: 0-NO PAIN

## 2024-10-07 NOTE — PROGRESS NOTES
"Subjective   Patient ID: Wong Pennington is a 54 y.o. male who presents for Cough (Chest congestion, cough, SOB 3 days).    HPI     Complaining of cough, fever, and shortness of breath since last 3 days  Denied any nasuea, vomiting, diarrhea  Cough is productive    Review of Systems   HENT:  Positive for postnasal drip. Negative for congestion, ear pain, sinus pain and sore throat.    Respiratory:  Positive for cough, shortness of breath and wheezing.    Cardiovascular:  Negative for chest pain, palpitations and leg swelling.   Gastrointestinal:  Negative for abdominal pain, blood in stool and constipation.   Genitourinary:  Negative for hematuria and urgency.   Musculoskeletal:  Negative for arthralgias and joint swelling.   Skin:  Negative for rash.   Neurological:  Negative for tremors, syncope, weakness and numbness.       Objective   BP (!) 170/92 (BP Location: Left arm, Patient Position: Sitting, BP Cuff Size: Large adult)   Pulse 70   Temp 37.5 °C (99.5 °F) (Temporal)   Ht 1.778 m (5' 10\")   Wt (!) 176 kg (388 lb)   SpO2 94%   BMI 55.67 kg/m²     Physical Exam  Constitutional:       General: He is not in acute distress.  HENT:      Head: Normocephalic and atraumatic.      Right Ear: Tympanic membrane normal.      Left Ear: Tympanic membrane normal.      Mouth/Throat:      Pharynx: No oropharyngeal exudate or posterior oropharyngeal erythema.   Cardiovascular:      Rate and Rhythm: Normal rate.      Heart sounds: No murmur heard.  Pulmonary:      Effort: No respiratory distress.      Breath sounds: No wheezing.   Abdominal:      General: Abdomen is flat. Bowel sounds are normal.      Palpations: Abdomen is soft.   Musculoskeletal:      Right lower leg: No edema.      Left lower leg: No edema.   Lymphadenopathy:      Cervical: No cervical adenopathy.   Neurological:      Mental Status: He is alert.         Assessment/Plan        Wilber \"Gabby" was seen today for cough.  Diagnoses and all orders for this " visit:  Acute cough (Primary)  -     XR chest 2 views; Future  Nasal congestion  -     POCT BinaxNOW Covid-19 Ag Card manually resulted  Shortness of breath  Wheezing  -     albuterol (Ventolin HFA) 90 mcg/actuation inhaler; Inhale 2 puffs every 4 hours if needed for wheezing or shortness of breath.  Upper respiratory tract infection, unspecified type  -     amoxicillin-pot clavulanate (Augmentin) 875-125 mg tablet; Take 1 tablet (875 mg) by mouth 2 times a day for 10 days.  -     CBC and Auto Differential; Future  -     Comprehensive Metabolic Panel; Future    COVID test negative in office  Blood pressure reading at home today morning 145/85  Advised to take Mucinex over-the-counter as needed  Go to ER if symptoms worsen    Current Outpatient Medications   Medication Instructions    albuterol (Ventolin HFA) 90 mcg/actuation inhaler 2 puffs, inhalation, Every 4 hours PRN    allopurinol (ZYLOPRIM) 300 mg, oral, Nightly    amLODIPine (NORVASC) 10 mg, oral, Daily    amoxicillin-pot clavulanate (Augmentin) 875-125 mg tablet 875 mg, oral, 2 times daily    aspirin 81 mg, oral, Daily    atorvastatin (LIPITOR) 10 mg, oral, Nightly    busPIRone (BUSPAR) 10 mg, oral, 2 times daily    cholecalciferol (VITAMIN D-3) 1,000 Units, oral, 2 times daily, as directed Orally    cyanocobalamin (VITAMIN B-12) 1,000 mcg, oral, Daily, For 30 day(s)    fluticasone (Flonase) 50 mcg/actuation nasal spray INSTILL 1 SPRAY INTO EACH NOSTRIL ONE TIME DAILY AS NEEDED    glucosam/chond/hyalu/CF borate (MOVE FREE [place] [facility] ORAL) 1 tablet, oral, 2 times daily    insulin degludec (Tresiba FlexTouch U-100) 100 unit/mL (3 mL) injection INJECT 70 UNITS SUBCUTANEOUSLY ONE TIME DAILY AS DIRECTED    krill/om-3/dha/epa/phospho/ast (MEGARED OMEGA-3 KRILL OIL ORAL) 500 mg, oral, Daily    L.acid/B.animalis,bifidum/FOS (PROBIOTIC COMPLEX ORAL) oral    losartan (COZAAR) 50 mg, oral, Daily    metFORMIN (GLUCOPHAGE) 1,000 mg, oral, 2 times daily (morning and  late afternoon)    metoprolol succinate XL (Toprol-XL) 50 mg 24 hr tablet TAKE 2 AND 1/2 TABLETS ONCEDAILY    miscellaneous medical supply misc CPAP mask and supplies; 14 cmH2O with HH and a Respironics dreamwear medium FFM    multivitamin with minerals iron-free (Centrum Silver) 1 tablet, oral, Daily    nystatin (Mycostatin) 100,000 unit/gram powder 1 Application, Topical, 2 times daily    potassium chloride CR (Klor-Con M10) 10 mEq ER tablet 10 mEq, oral, 2 times daily after meals

## 2024-10-08 ENCOUNTER — PHARMACY VISIT (OUTPATIENT)
Dept: PHARMACY | Facility: CLINIC | Age: 54
End: 2024-10-08
Payer: MEDICARE

## 2024-10-08 ASSESSMENT — ENCOUNTER SYMPTOMS
TREMORS: 0
JOINT SWELLING: 0
NUMBNESS: 0
BLOOD IN STOOL: 0
CONSTIPATION: 0
WEAKNESS: 0
SHORTNESS OF BREATH: 1
SORE THROAT: 0
ARTHRALGIAS: 0
SINUS PAIN: 0
ABDOMINAL PAIN: 0
WHEEZING: 1
COUGH: 1
HEMATURIA: 0
PALPITATIONS: 0

## 2024-10-09 DIAGNOSIS — I10 PRIMARY HYPERTENSION: ICD-10-CM

## 2024-10-10 RX ORDER — ATORVASTATIN CALCIUM 10 MG/1
10 TABLET, FILM COATED ORAL DAILY
Qty: 90 TABLET | Refills: 2 | Status: SHIPPED | OUTPATIENT
Start: 2024-10-10

## 2024-10-17 ENCOUNTER — HOSPITAL ENCOUNTER (OUTPATIENT)
Dept: RADIOLOGY | Facility: HOSPITAL | Age: 54
Discharge: HOME | End: 2024-10-17
Payer: COMMERCIAL

## 2024-10-17 DIAGNOSIS — N20.0 CALCULUS OF KIDNEY: ICD-10-CM

## 2024-10-17 DIAGNOSIS — R05.1 ACUTE COUGH: ICD-10-CM

## 2024-10-17 PROCEDURE — 71046 X-RAY EXAM CHEST 2 VIEWS: CPT | Performed by: RADIOLOGY

## 2024-10-17 PROCEDURE — 71046 X-RAY EXAM CHEST 2 VIEWS: CPT

## 2024-10-17 PROCEDURE — 74018 RADEX ABDOMEN 1 VIEW: CPT

## 2024-10-18 DIAGNOSIS — I10 PRIMARY HYPERTENSION: Primary | ICD-10-CM

## 2024-10-18 RX ORDER — HYDROCHLOROTHIAZIDE 25 MG/1
25 TABLET ORAL DAILY
Qty: 30 TABLET | Refills: 2 | Status: SHIPPED | OUTPATIENT
Start: 2024-10-18 | End: 2025-01-16

## 2024-10-21 DIAGNOSIS — E11.9 TYPE 2 DIABETES MELLITUS WITHOUT COMPLICATION, WITH LONG-TERM CURRENT USE OF INSULIN (MULTI): ICD-10-CM

## 2024-10-21 DIAGNOSIS — J06.9 UPPER RESPIRATORY TRACT INFECTION, UNSPECIFIED TYPE: Primary | ICD-10-CM

## 2024-10-21 DIAGNOSIS — Z79.4 TYPE 2 DIABETES MELLITUS WITHOUT COMPLICATION, WITH LONG-TERM CURRENT USE OF INSULIN (MULTI): ICD-10-CM

## 2024-10-21 PROCEDURE — RXMED WILLOW AMBULATORY MEDICATION CHARGE

## 2024-10-21 RX ORDER — DOXYCYCLINE 100 MG/1
100 CAPSULE ORAL 2 TIMES DAILY
Qty: 14 CAPSULE | Refills: 0 | Status: SHIPPED | OUTPATIENT
Start: 2024-10-21 | End: 2024-10-29

## 2024-10-21 RX ORDER — INSULIN DEGLUDEC 100 U/ML
INJECTION, SOLUTION SUBCUTANEOUS
Qty: 45 ML | Refills: 1 | Status: SHIPPED | OUTPATIENT
Start: 2024-10-21 | End: 2025-10-21

## 2024-10-22 ENCOUNTER — PHARMACY VISIT (OUTPATIENT)
Dept: PHARMACY | Facility: CLINIC | Age: 54
End: 2024-10-22
Payer: MEDICARE

## 2024-10-30 DIAGNOSIS — G47.33 OSA (OBSTRUCTIVE SLEEP APNEA): Primary | ICD-10-CM

## 2024-11-11 PROCEDURE — RXMED WILLOW AMBULATORY MEDICATION CHARGE

## 2024-11-12 ENCOUNTER — PHARMACY VISIT (OUTPATIENT)
Dept: PHARMACY | Facility: CLINIC | Age: 54
End: 2024-11-12
Payer: MEDICARE

## 2024-11-13 ENCOUNTER — PATIENT OUTREACH (OUTPATIENT)
Dept: PRIMARY CARE | Facility: CLINIC | Age: 54
End: 2024-11-13
Payer: COMMERCIAL

## 2024-11-14 DIAGNOSIS — F41.9 ANXIETY: ICD-10-CM

## 2024-11-15 RX ORDER — BUSPIRONE HYDROCHLORIDE 10 MG/1
10 TABLET ORAL 2 TIMES DAILY
Qty: 180 TABLET | Refills: 0 | Status: SHIPPED | OUTPATIENT
Start: 2024-11-15

## 2024-11-30 PROCEDURE — RXMED WILLOW AMBULATORY MEDICATION CHARGE

## 2024-12-04 ENCOUNTER — PHARMACY VISIT (OUTPATIENT)
Dept: PHARMACY | Facility: CLINIC | Age: 54
End: 2024-12-04
Payer: MEDICARE

## 2024-12-19 ENCOUNTER — PHARMACY VISIT (OUTPATIENT)
Dept: PHARMACY | Facility: CLINIC | Age: 54
End: 2024-12-19
Payer: MEDICARE

## 2024-12-19 PROCEDURE — RXMED WILLOW AMBULATORY MEDICATION CHARGE

## 2025-01-13 ENCOUNTER — APPOINTMENT (OUTPATIENT)
Dept: PRIMARY CARE | Facility: CLINIC | Age: 55
End: 2025-01-13

## 2025-01-16 ENCOUNTER — PRE-ADMISSION TESTING (OUTPATIENT)
Dept: PREADMISSION TESTING | Facility: HOSPITAL | Age: 55
End: 2025-01-16
Payer: MEDICARE

## 2025-01-16 VITALS
TEMPERATURE: 97.3 F | HEIGHT: 70 IN | DIASTOLIC BLOOD PRESSURE: 88 MMHG | BODY MASS INDEX: 45.1 KG/M2 | SYSTOLIC BLOOD PRESSURE: 156 MMHG | HEART RATE: 86 BPM | WEIGHT: 315 LBS | OXYGEN SATURATION: 95 % | RESPIRATION RATE: 16 BRPM

## 2025-01-16 DIAGNOSIS — Z01.818 PREOP TESTING: Primary | ICD-10-CM

## 2025-01-16 LAB
ANION GAP SERPL CALCULATED.3IONS-SCNC: 11 MMOL/L (ref 10–20)
BASOPHILS # BLD AUTO: 0.1 X10*3/UL (ref 0–0.1)
BASOPHILS NFR BLD AUTO: 1 %
BUN SERPL-MCNC: 24 MG/DL (ref 6–23)
CALCIUM SERPL-MCNC: 9.3 MG/DL (ref 8.6–10.3)
CHLORIDE SERPL-SCNC: 101 MMOL/L (ref 98–107)
CO2 SERPL-SCNC: 29 MMOL/L (ref 21–32)
CREAT SERPL-MCNC: 0.96 MG/DL (ref 0.5–1.3)
EGFRCR SERPLBLD CKD-EPI 2021: >90 ML/MIN/1.73M*2
EOSINOPHIL # BLD AUTO: 0.68 X10*3/UL (ref 0–0.7)
EOSINOPHIL NFR BLD AUTO: 6.8 %
ERYTHROCYTE [DISTWIDTH] IN BLOOD BY AUTOMATED COUNT: 14.8 % (ref 11.5–14.5)
EST. AVERAGE GLUCOSE BLD GHB EST-MCNC: 266 MG/DL
GLUCOSE SERPL-MCNC: 304 MG/DL (ref 74–99)
HBA1C MFR BLD: 10.9 %
HCT VFR BLD AUTO: 40.1 % (ref 41–52)
HGB BLD-MCNC: 12.8 G/DL (ref 13.5–17.5)
IMM GRANULOCYTES # BLD AUTO: 0.06 X10*3/UL (ref 0–0.7)
IMM GRANULOCYTES NFR BLD AUTO: 0.6 % (ref 0–0.9)
LYMPHOCYTES # BLD AUTO: 1.67 X10*3/UL (ref 1.2–4.8)
LYMPHOCYTES NFR BLD AUTO: 16.8 %
MCH RBC QN AUTO: 25.8 PG (ref 26–34)
MCHC RBC AUTO-ENTMCNC: 31.9 G/DL (ref 32–36)
MCV RBC AUTO: 81 FL (ref 80–100)
MONOCYTES # BLD AUTO: 0.84 X10*3/UL (ref 0.1–1)
MONOCYTES NFR BLD AUTO: 8.5 %
NEUTROPHILS # BLD AUTO: 6.58 X10*3/UL (ref 1.2–7.7)
NEUTROPHILS NFR BLD AUTO: 66.3 %
NRBC BLD-RTO: 0 /100 WBCS (ref 0–0)
PLATELET # BLD AUTO: 255 X10*3/UL (ref 150–450)
POTASSIUM SERPL-SCNC: 4.2 MMOL/L (ref 3.5–5.3)
RBC # BLD AUTO: 4.96 X10*6/UL (ref 4.5–5.9)
SODIUM SERPL-SCNC: 137 MMOL/L (ref 136–145)
WBC # BLD AUTO: 9.9 X10*3/UL (ref 4.4–11.3)

## 2025-01-16 PROCEDURE — 36415 COLL VENOUS BLD VENIPUNCTURE: CPT

## 2025-01-16 PROCEDURE — 83036 HEMOGLOBIN GLYCOSYLATED A1C: CPT | Mod: TRILAB

## 2025-01-16 PROCEDURE — 85025 COMPLETE CBC W/AUTO DIFF WBC: CPT

## 2025-01-16 PROCEDURE — 99204 OFFICE O/P NEW MOD 45 MIN: CPT | Performed by: NURSE PRACTITIONER

## 2025-01-16 PROCEDURE — RXMED WILLOW AMBULATORY MEDICATION CHARGE

## 2025-01-16 PROCEDURE — 80048 BASIC METABOLIC PNL TOTAL CA: CPT

## 2025-01-16 ASSESSMENT — DUKE ACTIVITY SCORE INDEX (DASI)
CAN YOU DO LIGHT WORK AROUND THE HOUSE LIKE DUSTING OR WASHING DISHES: YES
CAN YOU PARTICIPATE IN MODERATE RECREATIONAL ACTIVITIES LIKE GOLF, BOWLING, DANCING, DOUBLES TENNIS OR THROWING A BASEBALL OR FOOTBALL: NO
CAN YOU DO HEAVY WORK AROUND THE HOUSE LIKE SCRUBBING FLOORS OR LIFTING AND MOVING HEAVY FURNITURE: NO
CAN YOU PARTICIPATE IN STRENOUS SPORTS LIKE SWIMMING, SINGLES TENNIS, FOOTBALL, BASKETBALL, OR SKIING: NO
CAN YOU TAKE CARE OF YOURSELF (EAT, DRESS, BATHE, OR USE TOILET): YES
CAN YOU WALK A BLOCK OR TWO ON LEVEL GROUND: YES
CAN YOU RUN A SHORT DISTANCE: NO
DASI METS SCORE: 6.3
CAN YOU DO YARD WORK LIKE RAKING LEAVES, WEEDING OR PUSHING A MOWER: YES
CAN YOU HAVE SEXUAL RELATIONS: YES
CAN YOU DO MODERATE WORK AROUND THE HOUSE LIKE VACUUMING, SWEEPING FLOORS OR CARRYING GROCERIES: YES
CAN YOU WALK INDOORS, SUCH AS AROUND YOUR HOUSE: YES
TOTAL_SCORE: 28.7
CAN YOU CLIMB A FLIGHT OF STAIRS OR WALK UP A HILL: YES

## 2025-01-16 ASSESSMENT — ENCOUNTER SYMPTOMS
BACK PAIN: 1
PSYCHIATRIC NEGATIVE: 1
NEUROLOGICAL NEGATIVE: 1
SHORTNESS OF BREATH: 1
ARTHRALGIAS: 1
ENDOCRINE NEGATIVE: 1
CONSTITUTIONAL NEGATIVE: 1
GASTROINTESTINAL NEGATIVE: 1
HEMATOLOGIC/LYMPHATIC NEGATIVE: 1

## 2025-01-16 ASSESSMENT — PAIN - FUNCTIONAL ASSESSMENT: PAIN_FUNCTIONAL_ASSESSMENT: 0-10

## 2025-01-16 ASSESSMENT — PAIN SCALES - GENERAL: PAINLEVEL_OUTOF10: 0 - NO PAIN

## 2025-01-16 NOTE — CPM/PAT H&P
"CPM/PAT Evaluation       Name: Wilber Pennington (Wilber Pennington \"Wong\")  /Age: 1970/55 y.o.     In-Person       Chief Complaint: right kidney stone    HPI    Pt is a 55 year old male with right kidney stones. Pt was hospitalized this past August with E-coli septicemia of urinary origin. During his hospitalization imaging was completed that revealed: There are 3 large nephroliths in the midpole/lower pole region of the right kidney measuring 21 mm, 18 mm, and 11 mm. Pt denies fevers, chills, flank pain, abdominal pain, dysuria, gross hematuria, and urinary frequency. Pt was examined by his urologist and has been scheduled for right extracorporeal shock wave lithotripsy. Pt denies CP, SOB, or dizziness.     Past Medical History:   Diagnosis Date    Acute cystitis without hematuria 2024    Anxiety     Cerebral vascular accident (Multi) 2008    Diabetes mellitus (Multi)     Gout     Hypertension     KARINA (obstructive sleep apnea)     Pneumonia 2024    Venous stasis of both lower extremities     Yeast infection of the skin 2024       Past Surgical History:   Procedure Laterality Date    EYE SURGERY Right     VASECTOMY       Social History     Tobacco Use    Smoking status: Never    Smokeless tobacco: Never   Substance Use Topics    Alcohol use: Yes     Comment: 1/month     Social History     Substance and Sexual Activity   Drug Use Never       Patient Sexual activity questions deferred to the physician.    Family History   Problem Relation Name Age of Onset    Heart failure Mother          Congested    Other (cerebrovascular disease) Mother      Hypertension Mother      Heart failure Father          Congested    Coronary artery disease Father      Hypertension Father      Multiple sclerosis Sister      Other (Gluten sensitive enteropathy) Sister      Hypertension Brother      Other (Gluten sensitive enteropathy) Brother      Other (ADHD) Brother      Celiac disease Brother      Hypertension " Brother       No Known Allergies    Current Outpatient Medications   Medication Sig Dispense Refill    albuterol (Ventolin HFA) 90 mcg/actuation inhaler Inhale 2 puffs every 4 hours if needed for wheezing or shortness of breath. 18 g 5    allopurinol (Zyloprim) 300 mg tablet Take 1 tablet (300 mg) by mouth once daily at bedtime. 90 tablet 1    amLODIPine (Norvasc) 10 mg tablet Take 1 tablet (10 mg) by mouth once daily. 90 tablet 1    aspirin 81 mg EC tablet Take 1 tablet (81 mg) by mouth once daily.      atorvastatin (Lipitor) 10 mg tablet TAKE 1 TABLET DAILY 90 tablet 2    busPIRone (Buspar) 10 mg tablet TAKE 1 TABLET TWICE A  tablet 0    cholecalciferol (Vitamin D-3) 25 MCG (1000 UT) tablet Take 1 tablet (1,000 Units) by mouth 2 times a day. as directed Orally      cyanocobalamin (Vitamin B-12) 1,000 mcg tablet Take 1 tablet (1,000 mcg) by mouth once daily. For 30 day(s)      fluticasone (Flonase) 50 mcg/actuation nasal spray INSTILL 1 SPRAY INTO EACH NOSTRIL ONE TIME DAILY AS NEEDED 48 g 3    glucosam/chond/hyalu/CF borate (MOVE FREE JOINT HEALTH ORAL) Take 1 tablet by mouth 2 times a day.      hydroCHLOROthiazide (HYDRODiuril) 25 mg tablet Take 1 tablet (25 mg) by mouth once daily. 30 tablet 2    insulin degludec (Tresiba FlexTouch U-100) 100 unit/mL (3 mL) injection INJECT 70 UNITS SUBCUTANEOUSLY ONE TIME DAILY AS DIRECTED 45 mL 1    krill/om-3/dha/epa/phospho/ast (MEGARED OMEGA-3 KRILL OIL ORAL) Take 500 mg by mouth once daily.      losartan (Cozaar) 50 mg tablet Take 1 tablet (50 mg) by mouth once daily. 100 tablet 1    metFORMIN (Glucophage) 500 mg tablet TAKE 2 TABLETS 2 TIMES     DAILY WITH MEALS 360 tablet 1    metoprolol succinate XL (Toprol-XL) 50 mg 24 hr tablet TAKE 2 AND 1/2 TABLETS ONCEDAILY 225 tablet 1    miscellaneous medical supply misc CPAP mask and supplies; 14 cmH2O with HH and a Respironics dreamwear medium FFM      multivitamin with minerals iron-free (Centrum Silver) Take 1 tablet  by mouth once daily.      nystatin (Mycostatin) 100,000 unit/gram powder Apply 1 Application topically 2 times a day. 30 g 0    potassium chloride CR (Klor-Con M10) 10 mEq ER tablet TAKE 1 TABLET 2 TIMES DAILYWITH FOOD 180 tablet 2     No current facility-administered medications for this visit.     Review of Systems   Constitutional: Negative.    HENT: Negative.     Respiratory:  Positive for shortness of breath (with over exertion).    Cardiovascular:  Positive for leg swelling (H/O peripheral edema).   Gastrointestinal: Negative.    Endocrine: Negative.    Genitourinary:         Right kidney stones   Musculoskeletal:  Positive for arthralgias and back pain.   Skin: Negative.    Neurological: Negative.         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    Psychiatric/Behavioral: Negative.       Physical Exam  Vitals reviewed.   Constitutional:       Appearance: He is morbidly obese.   HENT:      Head: Normocephalic and atraumatic.      Nose: Nose normal.      Mouth/Throat:      Mouth: Mucous membranes are moist.      Pharynx: Oropharynx is clear.   Eyes:      Conjunctiva/sclera: Conjunctivae normal.      Comments: Legally blind in his right eye   Cardiovascular:      Rate and Rhythm: Normal rate and regular rhythm.      Pulses: Normal pulses.      Heart sounds: Normal heart sounds.   Pulmonary:      Effort: Pulmonary effort is normal. No respiratory distress.      Breath sounds: Normal breath sounds. No wheezing, rhonchi or rales.      Comments: No conversational dyspnea  Abdominal:      General: Bowel sounds are normal.      Palpations: Abdomen is soft.      Tenderness: There is no abdominal tenderness. There is no guarding or rebound.      Hernia: A hernia (umbilical hernia; denies pain with palpation over the hernia) is present.   Musculoskeletal:      Cervical back: Normal range of motion and neck supple.      Right lower leg: Edema  "present.      Left lower leg: Edema present.      Comments: Pt is wearing BLE compression stockings   Skin:     General: Skin is warm and dry.   Neurological:      General: No focal deficit present.      Mental Status: He is alert and oriented to person, place, and time. Mental status is at baseline.   Psychiatric:         Mood and Affect: Mood normal.         Behavior: Behavior normal.         Thought Content: Thought content normal.         Judgment: Judgment normal.        PAT AIRWAY:   Airway:     Mallampati::  IV    TM distance::  >3 FB    Neck ROM::  Full  normal      /88   Pulse 86   Temp 36.3 °C (97.3 °F) (Temporal)   Resp 16   Ht 1.778 m (5' 10\")   Wt (!) 181 kg (400 lb)   SpO2 95%   BMI 57.39 kg/m²     Visit Vitals  /88   Pulse 86   Temp 36.3 °C (97.3 °F) (Temporal)   Resp 16   Ht 1.778 m (5' 10\")   Wt (!) 181 kg (400 lb)   SpO2 95%   BMI 57.39 kg/m²   Smoking Status Never   BSA 2.99 m²     ASA: 3  CHADS: 8.5%  RCRI: 0.9%  Ariscat: 1.6%  DASI Risk Score      Flowsheet Row Pre-Admission Testing from [facility]16/2025 in [facility]   Can you take care of yourself (eat, dress, bathe, or use toilet)?  2.75 filed at 01/16/2025 0827   Can you walk indoors, such as around your house? 1.75 filed at 01/16/2025 0827   Can you walk a block or two on level ground?  2.75 filed at 01/16/2025 0827   Can you climb a flight of stairs or walk up a hill? 5.5 filed at 01/16/2025 0827   Can you run a short distance? 0 filed at 01/16/2025 0827   Can you do light work around the house like dusting or washing dishes? 2.7 filed at 01/16/2025 0827   Can you do moderate work around the house like vacuuming, sweeping floors or carrying groceries? 3.5 filed at 01/16/2025 0827   Can you do heavy work around the house like scrubbing floors or lifting and moving heavy furniture?  0 filed at 01/16/2025 5948   Can you do yard work like raking leaves, weeding or pushing a mower? 4.5 filed at 01/16/2025 3892   Can " you have sexual relations? 5.25 filed at 01/16/2025 0827   Can you participate in moderate recreational activities like golf, bowling, dancing, doubles tennis or throwing a baseball or football? 0 filed at 01/16/2025 0827   Can you participate in strenous sports like swimming, singles tennis, football, basketball, or skiing? 0 filed at 01/16/2025 0827   DASI SCORE 28.7 filed at 01/16/2025 0827   METS Score (Will be calculated only when all the questions are answered) 6.3 filed at 01/16/2025 0827          Caprini DVT Assessment      Flowsheet Row ED to Hosp-Admission (Discharged) from 8/12/2024 in [facility] with Scot Pinto MD   DVT Score (IF A SCORE IS NOT CALCULATING, MUST SELECT A BMI TO COMPLETE) 4 filed at 08/15/2024 1131   BMI (BMI MUST BE CHOSEN) Greater than 50 (Venous stasis syndrome) filed at 08/15/2024 1131   RETIRED: Age 40-59 years filed at 08/15/2024 1131          Modified Frailty Index    No data to display       CHADS2 Stroke Risk  Current as of 35 minutes ago        N/A 3 to 100%: High Risk   2 to < 3%: Medium Risk   0 to < 2%: Low Risk     Last Change: N/A          This score determines the patient's risk of having a stroke if the patient has atrial fibrillation.        This score is not applicable to this patient. Components are not calculated.          Revised Cardiac Risk Index      Flowsheet Row Pre-Admission Testing from 1/16/2025 in [facility]   High-Risk Surgery (Intraperitoneal, Intrathoracic,Suprainguinal vascular) 0 filed at 01/16/2025 0920   History of ischemic heart disease (History of MI, History of positive exercuse test, Current chest paint considered due to myocardial ischemia, Use of nitrate therapy, ECG with pathological Q Waves) 0 filed at 01/16/2025 0920   History of congestive heart failure (pulmonary edemia, bilateral rales or S3 gallop, Paroxysmal nocturnal dyspnea, CXR showing pulmonary vascular redistribution) 0 filed at 01/16/2025  0920   History of cerebrovascular disease (Prior TIA or stroke) 1 filed at 01/16/2025 0920   Pre-operative insulin treatment 0 filed at 01/16/2025 0920   Pre-operative creatinine>2 mg/dl 0 filed at 01/16/2025 0920   Revised Cardiac Risk Calculator 1 filed at 01/16/2025 0920          Apfel Simplified Score    No data to display       Risk Analysis Index Results This Encounter    No data found in the last 10 encounters.       Stop Bang Score      Flowsheet Row Pre-Admission Testing from 1/16/2025 in [facility]   Do you snore loudly? 1 filed at 01/16/2025 0828   Do you often feel tired or fatigued after your sleep? 0 filed at 01/16/2025 0828   Has anyone ever observed you stop breathing in your sleep? 1 filed at 01/16/2025 0828   Do you have or are you being treated for high blood pressure? 1 filed at 01/16/2025 0828   Recent BMI (Calculated) 57.4 filed at 01/16/2025 0828   Is BMI greater than 35 kg/m2? 1=Yes filed at 01/16/2025 0828   Age older than 50 years old? 1=Yes filed at 01/16/2025 0828   Is your neck circumference greater than 17 inches (Male) or 16 inches (Female)? 1 filed at 01/16/2025 0828   Gender - Male 1=Yes filed at 01/16/2025 0828   STOP-BANG Total Score 7 filed at 01/16/2025 0828          Prodigy: High Risk  Total Score: 8              Prodigy Gender Score          ARISCAT Score for Postoperative Pulmonary Complications    No data to display       Shah Perioperative Risk for Myocardial Infarction or Cardiac Arrest (AMY)    No data to display         Assessment and Plan:     Calculus of kidney: Extracorporeal Shock Wave Lithotripsy right.  HTN: Pt is taking amlodipine, losartan, hydrochlorothiazide, and metoprolol succinate XL.   Hyperlipidemia: pt is taking atorvastatin  KARINA: compliant with CPAP use.   DM2: Pt is on Tresibe and Metformin. Checking HgbA1C in PAT.   H/O CVA in 2008: residual damage to right eye. Pt stated he is legally blind in his right eye.   H/O peripheral edema: pt  wears BLE compression stockings   Anxiety: Pt is taking Buspirone.   Gout: pt is taking allopurinol.   Hospitalized for UTI and bacteremia in 8/2024:   BMI: 57.39    CBC, BMP, and HgbA1C collected in PAT.  EKG completed on 8/12/2024.    Mckayla Rodriguez, APRN-CNP

## 2025-01-16 NOTE — PREPROCEDURE INSTRUCTIONS
Medication List            Accurate as of January 16, 2025  8:32 AM. Always use your most recent med list.                albuterol 90 mcg/actuation inhaler  Commonly known as: Ventolin HFA  Inhale 2 puffs every 4 hours if needed for wheezing or shortness of breath.  Medication Adjustments for Surgery: Take/Use as prescribed     allopurinol 300 mg tablet  Commonly known as: Zyloprim  Take 1 tablet (300 mg) by mouth once daily at bedtime.  Medication Adjustments for Surgery: Take/Use as prescribed     amLODIPine 10 mg tablet  Commonly known as: Norvasc  Take 1 tablet (10 mg) by mouth once daily.  Medication Adjustments for Surgery: Take on the morning of surgery     aspirin 81 mg EC tablet  Additional Medication Adjustments for Surgery: Take last dose 7 days before surgery     atorvastatin 10 mg tablet  Commonly known as: Lipitor  TAKE 1 TABLET DAILY  Medication Adjustments for Surgery: Take on the morning of surgery     busPIRone 10 mg tablet  Commonly known as: Buspar  TAKE 1 TABLET TWICE A DAY  Medication Adjustments for Surgery: Take on the morning of surgery     cholecalciferol 25 MCG (1000 UT) tablet  Commonly known as: Vitamin D-3  Additional Medication Adjustments for Surgery: Take last dose 7 days before surgery     fluticasone 50 mcg/actuation nasal spray  Commonly known as: Flonase  INSTILL 1 SPRAY INTO EACH NOSTRIL ONE TIME DAILY AS NEEDED  Medication Adjustments for Surgery: Take/Use as prescribed     hydroCHLOROthiazide 25 mg tablet  Commonly known as: HYDRODiuril  Take 1 tablet (25 mg) by mouth once daily.  Medication Adjustments for Surgery: Take on the morning of surgery     Klor-Con M10 10 mEq ER tablet  Generic drug: potassium chloride CR  TAKE 1 TABLET 2 TIMES DAILYWITH FOOD  Medication Adjustments for Surgery: Take on the morning of surgery     losartan 50 mg tablet  Commonly known as: Cozaar  Take 1 tablet (50 mg) by mouth once daily.  Medication Adjustments for Surgery: Do Not take on the  morning of surgery     MEGARED OMEGA-3 KRILL OIL ORAL  Additional Medication Adjustments for Surgery: Take last dose 7 days before surgery     metFORMIN 500 mg tablet  Commonly known as: Glucophage  TAKE 2 TABLETS 2 TIMES     DAILY WITH MEALS  Medication Adjustments for Surgery: Do Not take on the morning of surgery     metoprolol succinate XL 50 mg 24 hr tablet  Commonly known as: Toprol-XL  TAKE 2 AND 1/2 TABLETS ONCEDAILY  Medication Adjustments for Surgery: Take on the morning of surgery        MOVE FREE JOINT HEALTH ORAL  Additional Medication Adjustments for Surgery: Take last dose 7 days before surgery     multivitamin with minerals iron-free  Commonly known as: Centrum Silver  Additional Medication Adjustments for Surgery: Take last dose 7 days before surgery     nystatin 100,000 unit/gram powder  Commonly known as: Mycostatin  Apply 1 Application topically 2 times a day.  Medication Adjustments for Surgery: Take/Use as prescribed     Tresiba FlexTouch U-100 100 unit/mL (3 mL) injection  Generic drug: insulin degludec  INJECT 70 UNITS SUBCUTANEOUSLY ONE TIME DAILY AS DIRECTED  Medication Adjustments for Surgery: Take/Use as prescribed     Vitamin B-12 1,000 mcg tablet  Generic drug: cyanocobalamin  Additional Medication Adjustments for Surgery: Take last dose 7 days before surgery                       Why must I stop eating and drinking near surgery time?  With sedation, food or liquid in your stomach can enter your lungs causing serious complications  Increases nausea and vomiting    When do I need to stop eating and drinking before my surgery?   Do not eat or drink after midnight the night before your surgery/procedure.  You may have small sips of water to take your medication.    PAT DISCHARGE INSTRUCTIONS    Please call the Same Day Surgery (SDS) Department of the hospital where your procedure will be performed after 2:00 PM the day before your surgery. If you are scheduled on a Monday, or a Tuesday  following a Monday holiday, you will need to call on the last business day prior to your surgery.    [facility]  [address] [place] [address] [phone]  [facility]  [address] [facility], [address] [phone]  [facility]  [address] [place] [address] [phone]    Please let your surgeon know if:      You develop any open sores, shingles, burning or painful urination as these may increase your risk of an infection.   You no longer wish to have the surgery.   Any other personal circumstances change that may lead to the need to cancel or defer this surgery-such as being sick or getting admitted to any hospital within one week of your planned procedure.    Your contact details change, such as a change of address or phone number.    Starting now:     Please DO NOT drink alcohol or smoke for 24 hours before surgery. It is well known that quitting smoking can make a huge difference to your health and recovery from surgery. The longer you abstain from smoking, the better your chances of a healthy recovery. If you need help with quitting, call 8-281-QUIT-NOW to be connected to a trained counselor who will discuss the best methods to help you quit.     Before your surgery:    Please stop all supplements 7 days prior to surgery. Or as directed by your surgeon.   Please stop taking NSAID pain medicine such as Advil and Motrin 7 days before surgery.    If you develop any fever, cough, cold, rashes, cuts, scratches, scrapes, urinary symptoms or infection anywhere on your body (including teeth and gums) prior to surgery, please call your surgeon’s office as soon as possible. This may require treatment to reduce the chance of cancellation on the day of surgery.    The day before your surgery:   DIET- Please follow the diet instructions at the top of your  packet.   Get a good night’s rest.  Use the special soap for bathing if you have been instructed to use one.    Scheduled surgery times may change and you will be notified if this occurs - please check your personal voicemail for any updates.     On the morning of surgery:   Wear comfortable, loose fitting clothes which open in the front. Please do not wear moisturizers, creams, lotions, makeup or perfume.    Please bring with you to surgery:   Photo ID and insurance card   Current list of medicines and allergies   Pacemaker/ Defibrillator/Heart stent cards   CPAP machine and mask    Slings/ splints/ crutches   A copy of your complete advanced directive/DHPOA.    Please do NOT bring with you to surgery:   All jewelry and valuables should be left at home.   Prosthetic devices such as contact lenses, hearing aids, dentures, eyelash extensions, hairpins and body piercings must be removed prior to going in to the surgical suite.    After outpatient surgery:   A responsible adult MUST accompany you at the time of discharge and stay with you for 24 hours after your surgery. You may NOT drive yourself home after surgery.    Do not drive, operate machinery, make critical decisions or do activities that require co-ordination or balance until after a night’s sleep.   Do not drink alcoholic beverages for 24 hours.   Instructions for resuming your medications will be provided by your surgeon.    CALL YOUR DOCTOR AFTER SURGERY IF YOU HAVE:     Chills and/or a fever of 101° F or higher.    Redness, swelling, pus or drainage from your surgical wound or a bad smell from the wound.    Lightheadedness, fainting or confusion.    Persistent vomiting (throwing up) and are not able to eat or drink for 12 hours.    Three or more loose, watery bowel movements in 24 hours (diarrhea).   Difficulty or pain while urinating( after non-urological surgery)    Pain and swelling in your legs, especially if it is only on one side.    Difficulty  breathing or are breathing faster than normal.    Any new concerning symptoms.

## 2025-01-16 NOTE — H&P (VIEW-ONLY)
"CPM/PAT Evaluation       Name: Wilber Pennington (Wilber Pennington \"Wong\")  /Age: 1970/55 y.o.     In-Person       Chief Complaint: right kidney stone    HPI    Pt is a 55 year old male with right kidney stones. Pt was hospitalized this past August with E-coli septicemia of urinary origin. During his hospitalization imaging was completed that revealed: There are 3 large nephroliths in the midpole/lower pole region of the right kidney measuring 21 mm, 18 mm, and 11 mm. Pt denies fevers, chills, flank pain, abdominal pain, dysuria, gross hematuria, and urinary frequency. Pt was examined by his urologist and has been scheduled for right extracorporeal shock wave lithotripsy. Pt denies CP, SOB, or dizziness.     Past Medical History:   Diagnosis Date    Acute cystitis without hematuria 2024    Anxiety     Cerebral vascular accident (Multi) 2008    Diabetes mellitus (Multi)     Gout     Hypertension     KARINA (obstructive sleep apnea)     Pneumonia 2024    Venous stasis of both lower extremities     Yeast infection of the skin 2024       Past Surgical History:   Procedure Laterality Date    EYE SURGERY Right     VASECTOMY       Social History     Tobacco Use    Smoking status: Never    Smokeless tobacco: Never   Substance Use Topics    Alcohol use: Yes     Comment: 1/month     Social History     Substance and Sexual Activity   Drug Use Never       Patient Sexual activity questions deferred to the physician.    Family History   Problem Relation Name Age of Onset    Heart failure Mother          Congested    Other (cerebrovascular disease) Mother      Hypertension Mother      Heart failure Father          Congested    Coronary artery disease Father      Hypertension Father      Multiple sclerosis Sister      Other (Gluten sensitive enteropathy) Sister      Hypertension Brother      Other (Gluten sensitive enteropathy) Brother      Other (ADHD) Brother      Celiac disease Brother      Hypertension " Brother       No Known Allergies    Current Outpatient Medications   Medication Sig Dispense Refill    albuterol (Ventolin HFA) 90 mcg/actuation inhaler Inhale 2 puffs every 4 hours if needed for wheezing or shortness of breath. 18 g 5    allopurinol (Zyloprim) 300 mg tablet Take 1 tablet (300 mg) by mouth once daily at bedtime. 90 tablet 1    amLODIPine (Norvasc) 10 mg tablet Take 1 tablet (10 mg) by mouth once daily. 90 tablet 1    aspirin 81 mg EC tablet Take 1 tablet (81 mg) by mouth once daily.      atorvastatin (Lipitor) 10 mg tablet TAKE 1 TABLET DAILY 90 tablet 2    busPIRone (Buspar) 10 mg tablet TAKE 1 TABLET TWICE A  tablet 0    cholecalciferol (Vitamin D-3) 25 MCG (1000 UT) tablet Take 1 tablet (1,000 Units) by mouth 2 times a day. as directed Orally      cyanocobalamin (Vitamin B-12) 1,000 mcg tablet Take 1 tablet (1,000 mcg) by mouth once daily. For 30 day(s)      fluticasone (Flonase) 50 mcg/actuation nasal spray INSTILL 1 SPRAY INTO EACH NOSTRIL ONE TIME DAILY AS NEEDED 48 g 3    glucosam/chond/hyalu/CF borate (MOVE FREE JOINT HEALTH ORAL) Take 1 tablet by mouth 2 times a day.      hydroCHLOROthiazide (HYDRODiuril) 25 mg tablet Take 1 tablet (25 mg) by mouth once daily. 30 tablet 2    insulin degludec (Tresiba FlexTouch U-100) 100 unit/mL (3 mL) injection INJECT 70 UNITS SUBCUTANEOUSLY ONE TIME DAILY AS DIRECTED 45 mL 1    krill/om-3/dha/epa/phospho/ast (MEGARED OMEGA-3 KRILL OIL ORAL) Take 500 mg by mouth once daily.      losartan (Cozaar) 50 mg tablet Take 1 tablet (50 mg) by mouth once daily. 100 tablet 1    metFORMIN (Glucophage) 500 mg tablet TAKE 2 TABLETS 2 TIMES     DAILY WITH MEALS 360 tablet 1    metoprolol succinate XL (Toprol-XL) 50 mg 24 hr tablet TAKE 2 AND 1/2 TABLETS ONCEDAILY 225 tablet 1    miscellaneous medical supply misc CPAP mask and supplies; 14 cmH2O with HH and a Respironics dreamwear medium FFM      multivitamin with minerals iron-free (Centrum Silver) Take 1 tablet  by mouth once daily.      nystatin (Mycostatin) 100,000 unit/gram powder Apply 1 Application topically 2 times a day. 30 g 0    potassium chloride CR (Klor-Con M10) 10 mEq ER tablet TAKE 1 TABLET 2 TIMES DAILYWITH FOOD 180 tablet 2     No current facility-administered medications for this visit.     Review of Systems   Constitutional: Negative.    HENT: Negative.     Respiratory:  Positive for shortness of breath (with over exertion).    Cardiovascular:  Positive for leg swelling (H/O peripheral edema).   Gastrointestinal: Negative.    Endocrine: Negative.    Genitourinary:         Right kidney stones   Musculoskeletal:  Positive for arthralgias and back pain.   Skin: Negative.    Neurological: Negative.         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    Psychiatric/Behavioral: Negative.       Physical Exam  Vitals reviewed.   Constitutional:       Appearance: He is morbidly obese.   HENT:      Head: Normocephalic and atraumatic.      Nose: Nose normal.      Mouth/Throat:      Mouth: Mucous membranes are moist.      Pharynx: Oropharynx is clear.   Eyes:      Conjunctiva/sclera: Conjunctivae normal.      Comments: Legally blind in his right eye   Cardiovascular:      Rate and Rhythm: Normal rate and regular rhythm.      Pulses: Normal pulses.      Heart sounds: Normal heart sounds.   Pulmonary:      Effort: Pulmonary effort is normal. No respiratory distress.      Breath sounds: Normal breath sounds. No wheezing, rhonchi or rales.      Comments: No conversational dyspnea  Abdominal:      General: Bowel sounds are normal.      Palpations: Abdomen is soft.      Tenderness: There is no abdominal tenderness. There is no guarding or rebound.      Hernia: A hernia (umbilical hernia; denies pain with palpation over the hernia) is present.   Musculoskeletal:      Cervical back: Normal range of motion and neck supple.      Right lower leg: Edema  "present.      Left lower leg: Edema present.      Comments: Pt is wearing BLE compression stockings   Skin:     General: Skin is warm and dry.   Neurological:      General: No focal deficit present.      Mental Status: He is alert and oriented to person, place, and time. Mental status is at baseline.   Psychiatric:         Mood and Affect: Mood normal.         Behavior: Behavior normal.         Thought Content: Thought content normal.         Judgment: Judgment normal.        PAT AIRWAY:   Airway:     Mallampati::  IV    TM distance::  >3 FB    Neck ROM::  Full  normal      /88   Pulse 86   Temp 36.3 °C (97.3 °F) (Temporal)   Resp 16   Ht 1.778 m (5' 10\")   Wt (!) 181 kg (400 lb)   SpO2 95%   BMI 57.39 kg/m²     Visit Vitals  /88   Pulse 86   Temp 36.3 °C (97.3 °F) (Temporal)   Resp 16   Ht 1.778 m (5' 10\")   Wt (!) 181 kg (400 lb)   SpO2 95%   BMI 57.39 kg/m²   Smoking Status Never   BSA 2.99 m²     ASA: 3  CHADS: 8.5%  RCRI: 0.9%  Ariscat: 1.6%  DASI Risk Score      Flowsheet Row Pre-Admission Testing from 1/16/2025 in [facility]   Can you take care of yourself (eat, dress, bathe, or use toilet)?  2.75 filed at 01/16/2025 0827   Can you walk indoors, such as around your house? 1.75 filed at 01/16/2025 0827   Can you walk a block or two on level ground?  2.75 filed at 01/16/2025 0827   Can you climb a flight of stairs or walk up a hill? 5.5 filed at 01/16/2025 0827   Can you run a short distance? 0 filed at 01/16/2025 0827   Can you do light work around the house like dusting or washing dishes? 2.7 filed at 01/16/2025 0827   Can you do moderate work around the house like vacuuming, sweeping floors or carrying groceries? 3.5 filed at 01/16/2025 0827   Can you do heavy work around the house like scrubbing floors or lifting and moving heavy furniture?  0 filed at 01/16/2025 9293   Can you do yard work like raking leaves, weeding or pushing a mower? 4.5 filed at 01/16/2025 9797   Can " you have sexual relations? 5.25 filed at 01/16/2025 0827   Can you participate in moderate recreational activities like golf, bowling, dancing, doubles tennis or throwing a baseball or football? 0 filed at 01/16/2025 0827   Can you participate in strenous sports like swimming, singles tennis, football, basketball, or skiing? 0 filed at 01/16/2025 0827   DASI SCORE 28.7 filed at 01/16/2025 0827   METS Score (Will be calculated only when all the questions are answered) 6.3 filed at 01/16/2025 0827          Caprini DVT Assessment      Flowsheet Row ED to Hosp-Admission (Discharged) from 8/12/2024 in [facility] with Scot Pinto MD   DVT Score (IF A SCORE IS NOT CALCULATING, MUST SELECT A BMI TO COMPLETE) 4 filed at 08/15/2024 1131   BMI (BMI MUST BE CHOSEN) Greater than 50 (Venous stasis syndrome) filed at 08/15/2024 1131   RETIRED: Age 40-59 years filed at 08/15/2024 1131          Modified Frailty Index    No data to display       CHADS2 Stroke Risk  Current as of 35 minutes ago        N/A 3 to 100%: High Risk   2 to < 3%: Medium Risk   0 to < 2%: Low Risk     Last Change: N/A          This score determines the patient's risk of having a stroke if the patient has atrial fibrillation.        This score is not applicable to this patient. Components are not calculated.          Revised Cardiac Risk Index      Flowsheet Row Pre-Admission Testing from 1/16/2025 in [facility]   High-Risk Surgery (Intraperitoneal, Intrathoracic,Suprainguinal vascular) 0 filed at 01/16/2025 0920   History of ischemic heart disease (History of MI, History of positive exercuse test, Current chest paint considered due to myocardial ischemia, Use of nitrate therapy, ECG with pathological Q Waves) 0 filed at 01/16/2025 0920   History of congestive heart failure (pulmonary edemia, bilateral rales or S3 gallop, Paroxysmal nocturnal dyspnea, CXR showing pulmonary vascular redistribution) 0 filed at 01/16/2025  0920   History of cerebrovascular disease (Prior TIA or stroke) 1 filed at 01/16/2025 0920   Pre-operative insulin treatment 0 filed at 01/16/2025 0920   Pre-operative creatinine>2 mg/dl 0 filed at 01/16/2025 0920   Revised Cardiac Risk Calculator 1 filed at 01/16/2025 0920          Apfel Simplified Score    No data to display       Risk Analysis Index Results This Encounter    No data found in the last 10 encounters.       Stop Bang Score      Flowsheet Row Pre-Admission Testing from [facility]/16/2025 in [facility]   Do you snore loudly? 1 filed at 01/16/2025 0828   Do you often feel tired or fatigued after your sleep? 0 filed at 01/16/2025 0828   Has anyone ever observed you stop breathing in your sleep? 1 filed at 01/16/2025 0828   Do you have or are you being treated for high blood pressure? 1 filed at 01/16/2025 0828   Recent BMI (Calculated) 57.4 filed at 01/16/2025 0828   Is BMI greater than 35 kg/m2? 1=Yes filed at 01/16/2025 0828   Age older than 50 years old? 1=Yes filed at 01/16/2025 0828   Is your neck circumference greater than 17 inches (Male) or 16 inches (Female)? 1 filed at 01/16/2025 0828   Gender - Male 1=Yes filed at 01/16/2025 0828   STOP-BANG Total Score 7 filed at 01/16/2025 0828          Prodigy: High Risk  Total Score: 8              Prodigy Gender Score          ARISCAT Score for Postoperative Pulmonary Complications    No data to display       Shah Perioperative Risk for Myocardial Infarction or Cardiac Arrest (AMY)    No data to display         Assessment and Plan:     Calculus of kidney: Extracorporeal Shock Wave Lithotripsy right.  HTN: Pt is taking amlodipine, losartan, hydrochlorothiazide, and metoprolol succinate XL.   Hyperlipidemia: pt is taking atorvastatin  KARINA: compliant with CPAP use.   DM2: Pt is on Tresibe and Metformin. Checking HgbA1C in PAT.   H/O CVA in 2008: residual damage to right eye. Pt stated he is legally blind in his right eye.   H/O peripheral edema: pt  wears BLE compression stockings   Anxiety: Pt is taking Buspirone.   Gout: pt is taking allopurinol.   Hospitalized for UTI and bacteremia in 8/2024:   BMI: 57.39    CBC, BMP, and HgbA1C collected in PAT.  EKG completed on 8/12/2024.    Mckayla Rodriguez, APRN-CNP

## 2025-01-17 ENCOUNTER — PHARMACY VISIT (OUTPATIENT)
Dept: PHARMACY | Facility: CLINIC | Age: 55
End: 2025-01-17
Payer: COMMERCIAL

## 2025-01-20 ENCOUNTER — APPOINTMENT (OUTPATIENT)
Dept: ENDOCRINOLOGY | Facility: CLINIC | Age: 55
End: 2025-01-20
Payer: COMMERCIAL

## 2025-01-20 DIAGNOSIS — I10 PRIMARY HYPERTENSION: ICD-10-CM

## 2025-01-20 RX ORDER — HYDROCHLOROTHIAZIDE 25 MG/1
25 TABLET ORAL DAILY
Qty: 30 TABLET | Refills: 2 | Status: SHIPPED | OUTPATIENT
Start: 2025-01-20

## 2025-01-21 ENCOUNTER — ANESTHESIA EVENT (OUTPATIENT)
Dept: OPERATING ROOM | Facility: HOSPITAL | Age: 55
End: 2025-01-21

## 2025-01-21 ENCOUNTER — HOSPITAL ENCOUNTER (OUTPATIENT)
Facility: HOSPITAL | Age: 55
Setting detail: OUTPATIENT SURGERY
Discharge: HOME | End: 2025-01-21
Attending: UROLOGY | Admitting: UROLOGY
Payer: MEDICARE

## 2025-01-21 ENCOUNTER — ANESTHESIA (OUTPATIENT)
Dept: OPERATING ROOM | Facility: HOSPITAL | Age: 55
End: 2025-01-21
Payer: MEDICARE

## 2025-01-21 VITALS
TEMPERATURE: 99.1 F | SYSTOLIC BLOOD PRESSURE: 172 MMHG | BODY MASS INDEX: 56.68 KG/M2 | RESPIRATION RATE: 18 BRPM | DIASTOLIC BLOOD PRESSURE: 95 MMHG | WEIGHT: 315 LBS | HEART RATE: 71 BPM | OXYGEN SATURATION: 99 %

## 2025-01-21 LAB — GLUCOSE BLD MANUAL STRIP-MCNC: 214 MG/DL (ref 74–99)

## 2025-01-21 PROCEDURE — 82947 ASSAY GLUCOSE BLOOD QUANT: CPT

## 2025-01-21 RX ORDER — CEFAZOLIN SODIUM IN 0.9 % NACL 3 G/100 ML
3 INTRAVENOUS SOLUTION, PIGGYBACK (ML) INTRAVENOUS ONCE
Status: DISCONTINUED | OUTPATIENT
Start: 2025-01-21 | End: 2025-01-21 | Stop reason: HOSPADM

## 2025-01-21 ASSESSMENT — PAIN - FUNCTIONAL ASSESSMENT: PAIN_FUNCTIONAL_ASSESSMENT: 0-10

## 2025-01-21 ASSESSMENT — PAIN SCALES - GENERAL: PAINLEVEL_OUTOF10: 0 - NO PAIN

## 2025-01-21 ASSESSMENT — COLUMBIA-SUICIDE SEVERITY RATING SCALE - C-SSRS
6. HAVE YOU EVER DONE ANYTHING, STARTED TO DO ANYTHING, OR PREPARED TO DO ANYTHING TO END YOUR LIFE?: NO
2. HAVE YOU ACTUALLY HAD ANY THOUGHTS OF KILLING YOURSELF?: NO
1. IN THE PAST MONTH, HAVE YOU WISHED YOU WERE DEAD OR WISHED YOU COULD GO TO SLEEP AND NOT WAKE UP?: NO

## 2025-01-21 NOTE — NURSING NOTE
Spoke with patient and wife regarding the cancellation of the procedure due to not having the appropriate sized bed.  Both upset due to the fact this is the second cancellation of this procedure, however, they both understand the reasoning.  Informed that Dr. Moody's office will follow up to reschedule.

## 2025-01-21 NOTE — DISCHARGE INSTRUCTIONS
Expect blood in the urine  Expect a bruise in the right flank  Expect frequent urination      In case of emergency call the office at [phone].    If unable to reach the office call [phone].    Because you have had anesthesia you should not drive nor engage in any heavy activity for 24 hours.    If there are any signs of infection such as fever >101.5, redness or pus from an incision, or unexpected burning with urination, contact the doctor at [phone].    If you have pain beyond what is expected for the procedure call the doctor at [phone]

## 2025-01-29 ENCOUNTER — ANESTHESIA EVENT (OUTPATIENT)
Dept: OPERATING ROOM | Facility: HOSPITAL | Age: 55
End: 2025-01-29
Payer: MEDICARE

## 2025-01-29 ENCOUNTER — PHARMACY VISIT (OUTPATIENT)
Dept: PHARMACY | Facility: CLINIC | Age: 55
End: 2025-01-29
Payer: COMMERCIAL

## 2025-01-29 ENCOUNTER — HOSPITAL ENCOUNTER (OUTPATIENT)
Facility: HOSPITAL | Age: 55
Setting detail: OUTPATIENT SURGERY
Discharge: HOME | End: 2025-01-29
Attending: UROLOGY | Admitting: UROLOGY
Payer: MEDICARE

## 2025-01-29 ENCOUNTER — ANESTHESIA (OUTPATIENT)
Dept: OPERATING ROOM | Facility: HOSPITAL | Age: 55
End: 2025-01-29
Payer: MEDICARE

## 2025-01-29 VITALS
HEART RATE: 67 BPM | OXYGEN SATURATION: 95 % | HEIGHT: 70 IN | DIASTOLIC BLOOD PRESSURE: 84 MMHG | WEIGHT: 315 LBS | BODY MASS INDEX: 45.1 KG/M2 | TEMPERATURE: 97 F | RESPIRATION RATE: 16 BRPM | SYSTOLIC BLOOD PRESSURE: 160 MMHG

## 2025-01-29 DIAGNOSIS — N20.0 KIDNEY STONE: Primary | ICD-10-CM

## 2025-01-29 LAB — GLUCOSE BLD MANUAL STRIP-MCNC: 241 MG/DL (ref 74–99)

## 2025-01-29 PROCEDURE — 7100000010 HC PHASE TWO TIME - EACH INCREMENTAL 1 MINUTE: Performed by: UROLOGY

## 2025-01-29 PROCEDURE — 7100000001 HC RECOVERY ROOM TIME - INITIAL BASE CHARGE: Performed by: UROLOGY

## 2025-01-29 PROCEDURE — A50590 PR FRAGMENT KIDNEY STONE/ ESWL: Performed by: NURSE ANESTHETIST, CERTIFIED REGISTERED

## 2025-01-29 PROCEDURE — 7100000002 HC RECOVERY ROOM TIME - EACH INCREMENTAL 1 MINUTE: Performed by: UROLOGY

## 2025-01-29 PROCEDURE — 3600000003 HC OR TIME - INITIAL BASE CHARGE - PROCEDURE LEVEL THREE: Performed by: UROLOGY

## 2025-01-29 PROCEDURE — 3600000008 HC OR TIME - EACH INCREMENTAL 1 MINUTE - PROCEDURE LEVEL THREE: Performed by: UROLOGY

## 2025-01-29 PROCEDURE — A50590 PR FRAGMENT KIDNEY STONE/ ESWL: Performed by: ANESTHESIOLOGY

## 2025-01-29 PROCEDURE — 2720000007 HC OR 272 NO HCPCS: Performed by: UROLOGY

## 2025-01-29 PROCEDURE — 3700000001 HC GENERAL ANESTHESIA TIME - INITIAL BASE CHARGE: Performed by: UROLOGY

## 2025-01-29 PROCEDURE — 2500000004 HC RX 250 GENERAL PHARMACY W/ HCPCS (ALT 636 FOR OP/ED): Performed by: NURSE ANESTHETIST, CERTIFIED REGISTERED

## 2025-01-29 PROCEDURE — RXMED WILLOW AMBULATORY MEDICATION CHARGE

## 2025-01-29 PROCEDURE — 82947 ASSAY GLUCOSE BLOOD QUANT: CPT

## 2025-01-29 PROCEDURE — 3700000002 HC GENERAL ANESTHESIA TIME - EACH INCREMENTAL 1 MINUTE: Performed by: UROLOGY

## 2025-01-29 PROCEDURE — 7100000009 HC PHASE TWO TIME - INITIAL BASE CHARGE: Performed by: UROLOGY

## 2025-01-29 RX ORDER — MEPERIDINE HYDROCHLORIDE 25 MG/ML
12.5 INJECTION INTRAMUSCULAR; INTRAVENOUS; SUBCUTANEOUS EVERY 10 MIN PRN
Status: DISCONTINUED | OUTPATIENT
Start: 2025-01-29 | End: 2025-01-29 | Stop reason: HOSPADM

## 2025-01-29 RX ORDER — MIDAZOLAM HYDROCHLORIDE 1 MG/ML
1 INJECTION, SOLUTION INTRAMUSCULAR; INTRAVENOUS ONCE AS NEEDED
Status: DISCONTINUED | OUTPATIENT
Start: 2025-01-29 | End: 2025-01-29 | Stop reason: HOSPADM

## 2025-01-29 RX ORDER — FENTANYL CITRATE 50 UG/ML
INJECTION, SOLUTION INTRAMUSCULAR; INTRAVENOUS AS NEEDED
Status: DISCONTINUED | OUTPATIENT
Start: 2025-01-29 | End: 2025-01-29

## 2025-01-29 RX ORDER — MIDAZOLAM HYDROCHLORIDE 1 MG/ML
INJECTION, SOLUTION INTRAMUSCULAR; INTRAVENOUS AS NEEDED
Status: DISCONTINUED | OUTPATIENT
Start: 2025-01-29 | End: 2025-01-29

## 2025-01-29 RX ORDER — SODIUM CHLORIDE, SODIUM LACTATE, POTASSIUM CHLORIDE, CALCIUM CHLORIDE 600; 310; 30; 20 MG/100ML; MG/100ML; MG/100ML; MG/100ML
100 INJECTION, SOLUTION INTRAVENOUS CONTINUOUS
Status: DISCONTINUED | OUTPATIENT
Start: 2025-01-29 | End: 2025-01-29 | Stop reason: HOSPADM

## 2025-01-29 RX ORDER — CEFAZOLIN SODIUM 2 G/100ML
INJECTION, SOLUTION INTRAVENOUS AS NEEDED
Status: DISCONTINUED | OUTPATIENT
Start: 2025-01-29 | End: 2025-01-29

## 2025-01-29 RX ORDER — ONDANSETRON HYDROCHLORIDE 2 MG/ML
4 INJECTION, SOLUTION INTRAVENOUS ONCE AS NEEDED
Status: DISCONTINUED | OUTPATIENT
Start: 2025-01-29 | End: 2025-01-29 | Stop reason: HOSPADM

## 2025-01-29 RX ORDER — ONDANSETRON HYDROCHLORIDE 2 MG/ML
INJECTION, SOLUTION INTRAVENOUS AS NEEDED
Status: DISCONTINUED | OUTPATIENT
Start: 2025-01-29 | End: 2025-01-29

## 2025-01-29 RX ORDER — FENTANYL CITRATE 50 UG/ML
50 INJECTION, SOLUTION INTRAMUSCULAR; INTRAVENOUS EVERY 5 MIN PRN
Status: DISCONTINUED | OUTPATIENT
Start: 2025-01-29 | End: 2025-01-29 | Stop reason: HOSPADM

## 2025-01-29 RX ORDER — CEFAZOLIN SODIUM IN 0.9 % NACL 3 G/100 ML
3 INTRAVENOUS SOLUTION, PIGGYBACK (ML) INTRAVENOUS ONCE
Status: DISCONTINUED | OUTPATIENT
Start: 2025-01-29 | End: 2025-01-29 | Stop reason: HOSPADM

## 2025-01-29 RX ORDER — LIDOCAINE HYDROCHLORIDE 20 MG/ML
INJECTION, SOLUTION INFILTRATION; PERINEURAL AS NEEDED
Status: DISCONTINUED | OUTPATIENT
Start: 2025-01-29 | End: 2025-01-29

## 2025-01-29 RX ORDER — OXYCODONE AND ACETAMINOPHEN 5; 325 MG/1; MG/1
1 TABLET ORAL EVERY 6 HOURS PRN
Qty: 12 TABLET | Refills: 0 | Status: SHIPPED | OUTPATIENT
Start: 2025-01-29 | End: 2025-02-01

## 2025-01-29 RX ORDER — ALBUTEROL SULFATE 0.83 MG/ML
2.5 SOLUTION RESPIRATORY (INHALATION) ONCE
Status: DISCONTINUED | OUTPATIENT
Start: 2025-01-29 | End: 2025-01-29 | Stop reason: HOSPADM

## 2025-01-29 RX ORDER — HYDROMORPHONE HYDROCHLORIDE 0.2 MG/ML
0.2 INJECTION INTRAMUSCULAR; INTRAVENOUS; SUBCUTANEOUS EVERY 5 MIN PRN
Status: DISCONTINUED | OUTPATIENT
Start: 2025-01-29 | End: 2025-01-29 | Stop reason: HOSPADM

## 2025-01-29 RX ORDER — PROPOFOL 10 MG/ML
INJECTION, EMULSION INTRAVENOUS AS NEEDED
Status: DISCONTINUED | OUTPATIENT
Start: 2025-01-29 | End: 2025-01-29

## 2025-01-29 RX ORDER — LIDOCAINE HYDROCHLORIDE 10 MG/ML
0.1 INJECTION, SOLUTION INFILTRATION; PERINEURAL ONCE
Status: DISCONTINUED | OUTPATIENT
Start: 2025-01-29 | End: 2025-01-29 | Stop reason: HOSPADM

## 2025-01-29 RX ADMIN — MIDAZOLAM 2 MG: 1 INJECTION INTRAMUSCULAR; INTRAVENOUS at 10:24

## 2025-01-29 RX ADMIN — DEXAMETHASONE SODIUM PHOSPHATE 4 MG: 4 INJECTION, SOLUTION INTRAMUSCULAR; INTRAVENOUS at 10:33

## 2025-01-29 RX ADMIN — PROPOFOL 200 MG: 10 INJECTION, EMULSION INTRAVENOUS at 10:30

## 2025-01-29 RX ADMIN — CEFAZOLIN SODIUM 3 G: 2 INJECTION, SOLUTION INTRAVENOUS at 10:31

## 2025-01-29 RX ADMIN — LIDOCAINE HYDROCHLORIDE 50 MG: 20 INJECTION, SOLUTION INFILTRATION; PERINEURAL at 10:30

## 2025-01-29 RX ADMIN — SODIUM CHLORIDE, POTASSIUM CHLORIDE, SODIUM LACTATE AND CALCIUM CHLORIDE: 600; 310; 30; 20 INJECTION, SOLUTION INTRAVENOUS at 10:24

## 2025-01-29 RX ADMIN — FENTANYL CITRATE 50 MCG: 50 INJECTION, SOLUTION INTRAMUSCULAR; INTRAVENOUS at 10:57

## 2025-01-29 RX ADMIN — FENTANYL CITRATE 50 MCG: 50 INJECTION, SOLUTION INTRAMUSCULAR; INTRAVENOUS at 10:30

## 2025-01-29 RX ADMIN — ONDANSETRON HYDROCHLORIDE 4 MG: 2 INJECTION INTRAMUSCULAR; INTRAVENOUS at 10:33

## 2025-01-29 ASSESSMENT — PAIN - FUNCTIONAL ASSESSMENT
PAIN_FUNCTIONAL_ASSESSMENT: 0-10
PAIN_FUNCTIONAL_ASSESSMENT: FLACC (FACE, LEGS, ACTIVITY, CRY, CONSOLABILITY)
PAIN_FUNCTIONAL_ASSESSMENT: 0-10

## 2025-01-29 ASSESSMENT — PAIN SCALES - GENERAL
PAINLEVEL_OUTOF10: 0 - NO PAIN

## 2025-01-29 NOTE — OP NOTE
"Extracorporeal Shockwave Lithotripsy (R) Operative Note     Date: 2025  OR Location: [facility]    Name: Wilber Pennington \"Wong\", : 1970, Age: 55 y.o., MRN: [id number], Sex: male    Diagnosis  Pre-op Diagnosis      * Calculus of kidney [N20.0] Post-op Diagnosis     * Calculus of kidney [N20.0]     Procedures  Extracorporeal Shockwave Lithotripsy  83563 - NV LITHOTRIPSY XTRCORP SHOCK WAVE      Surgeons      * Aleena Fowler - Primary    Resident/Fellow/Other Assistant:  Surgeons and Role:  * No surgeons found with a matching role *    Staff:   Circulator: Aubrey Turner Person: Gregg    Anesthesia Staff: Anesthesiologist: Clayton Parson DO  CRNA: NICOLE Summers    Procedure Summary  Anesthesia: General  ASA: III  Estimated Blood Loss: 0mL  Intra-op Medications:   Administrations occurring from 1040 to 1225 on 25:   Medication Name Total Dose   fentaNYL PF 0.05 mg/mL 50 mcg   LR bolus Cannot be calculated              Anesthesia Record               Intraprocedure I/O Totals          Intake    LR bolus 500.00 mL    Total Intake 500 mL          Specimen: No specimens collected              Drains and/or Catheters: * None in log *    Tourniquet Times:         Implants:     Findings: right superior pole stone broken, renal pelvis and lower pole are outside the depth limits of the machine based on body habitus    Indications: Wilber Pennington \"Gabby" is an 55 y.o. male who is having surgery for RT RENAL STONE N20.0.      The patient was seen in the preoperative area. The risks, benefits, complications, treatment options, non-operative alternatives, expected recovery and outcomes were discussed with the patient. The possibilities of reaction to medication, pulmonary aspiration, injury to surrounding structures, bleeding, recurrent infection, the need for additional procedures, failure to diagnose a condition, and creating a complication requiring transfusion or operation were discussed with the " patient. The patient concurred with the proposed plan, giving informed consent.  The site of surgery was properly noted/marked if necessary per policy. The patient has been actively warmed in preoperative area. Preoperative antibiotics have been ordered and given within 1 hours of incision. Venous thrombosis prophylaxis have been ordered including bilateral sequential compression devices    Procedure Details: After induction of anestheisa, pt placed in supine position and stone localized in flouroscopic exam.  Only the superior stone would allow placement into cross hairs, the other 2 were beyond the treatment path due to abdominal girth exceeding the limit.  2500 shocks provided  for breakage seen  Complications:  None; patient tolerated the procedure well.    Disposition: PACU - hemodynamically stable.  Condition: stable                 Additional Details: tolerated well    Attending Attestation: I performed the procedure.    Aleena Fowler  Phone Number: [phone]

## 2025-01-29 NOTE — ANESTHESIA PROCEDURE NOTES
Airway  Date/Time: 1/29/2025 10:30 AM  Urgency: elective    Airway not difficult    Staffing  Performed: CRNA   Authorized by: Clayton Parson DO    Performed by: JOANNA Summers-CRNA  Patient location during procedure: OR    Indications and Patient Condition  Indications for airway management: anesthesia  Spontaneous Ventilation: absent  Sedation level: deep  Preoxygenated: yes  Patient position: sniffing  Mask difficulty assessment: 0 - not attempted  Planned trial extubation    Final Airway Details  Final airway type: supraglottic airway      Successful airway: classic  Size 5     Number of attempts at approach: 1

## 2025-01-29 NOTE — ANESTHESIA POSTPROCEDURE EVALUATION
"Patient: Wilber Pennington \"Wong\"    Procedure Summary       Date: 01/29/25 Room / Location: [facility] 04 / [facility]    Anesthesia Start: 1024 Anesthesia Stop: 1143    Procedure: Extracorporeal Shockwave Lithotripsy (Right) Diagnosis:       Calculus of kidney      (RT RENAL STONE N20.0)    Surgeons: Aleena Fowler MD Responsible Provider: Clayton Parson DO    Anesthesia Type: general ASA Status: 3            Anesthesia Type: general    Vitals Value Taken Time   /83 01/29/25 1226   Temp 36.5 °C (97.7 °F) 01/29/25 1215   Pulse 65 01/29/25 1228   Resp 21 01/29/25 1228   SpO2 92 % 01/29/25 1228   Vitals shown include unfiled device data.    Anesthesia Post Evaluation    Patient location during evaluation: bedside  Patient participation: complete - patient participated  Level of consciousness: awake  Pain management: adequate  Airway patency: patent  Cardiovascular status: acceptable  Respiratory status: acceptable  Hydration status: acceptable  Postoperative Nausea and Vomiting: none        No notable events documented.    "

## 2025-01-29 NOTE — DISCHARGE SUMMARY
Discharge Diagnosis  Right renal stone    Issues Requiring Follow-Up  Unable to void, fever, vomiting beyond 12 hours    Test Results Pending At Discharge  Pending Labs       No current pending labs.            Hospital Course   Tolerated well    Pertinent Physical Exam At Time of Discharge  Physical Exam    Home Medications     Medication List      START taking these medications     oxyCODONE-acetaminophen 5-325 mg tablet; Commonly known as: Percocet;   Take 1 tablet by mouth every 6 hours if needed for severe pain (7 - 10)   for up to 3 days.     CONTINUE taking these medications     allopurinol 300 mg tablet; Commonly known as: Zyloprim; Take 1 tablet   (300 mg) by mouth once daily at bedtime.   amLODIPine 10 mg tablet; Commonly known as: Norvasc; Take 1 tablet (10   mg) by mouth once daily.   aspirin 81 mg EC tablet   atorvastatin 10 mg tablet; Commonly known as: Lipitor; TAKE 1 TABLET   DAILY   busPIRone 10 mg tablet; Commonly known as: Buspar; TAKE 1 TABLET TWICE A   DAY   cholecalciferol 25 MCG (1000 UT) tablet; Commonly known as: Vitamin D-3   fluticasone 50 mcg/actuation nasal spray; Commonly known as: Flonase;   INSTILL 1 SPRAY INTO EACH NOSTRIL ONE TIME DAILY AS NEEDED   hydroCHLOROthiazide 25 mg tablet; Commonly known as: HYDRODiuril; Take 1   tablet by mouth once daily   Klor-Con M10 10 mEq ER tablet; Generic drug: potassium chloride CR; TAKE   1 TABLET 2 TIMES DAILYWITH FOOD   losartan 50 mg tablet; Commonly known as: Cozaar; Take 1 tablet (50 mg)   by mouth once daily.   MEGARED OMEGA-3 KRILL OIL ORAL   metFORMIN 500 mg tablet; Commonly known as: Glucophage; TAKE 2 TABLETS 2   TIMES     DAILY WITH MEALS   metoprolol succinate XL 50 mg 24 hr tablet; Commonly known as:   Toprol-XL; TAKE 2 AND 1/2 TABLETS ONCEDAILY   miscellaneous medical supply misc   MOVE FREE Anonymess ORAL   multivitamin with minerals iron-free; Commonly known as: Centrum Silver   nystatin 100,000 unit/gram powder; Commonly known  as: Mycostatin; Apply   1 Application topically 2 times a day.   Tresiba FlexTouch U-100 100 unit/mL (3 mL) injection; Generic drug:   insulin degludec; INJECT 70 UNITS SUBCUTANEOUSLY ONE TIME DAILY AS   DIRECTED   Vitamin B-12 1,000 mcg tablet; Generic drug: cyanocobalamin     ASK your doctor about these medications     albuterol 90 mcg/actuation inhaler; Commonly known as: Ventolin HFA;   Inhale 2 puffs every 4 hours if needed for wheezing or shortness of   breath.       Outpatient Follow-Up  Future Appointments   Date Time Provider Department Center   2/24/2025  8:00 AM Micheline Barnett MD [id number]       Aleena Fowler MD

## 2025-01-29 NOTE — ANESTHESIA PREPROCEDURE EVALUATION
"Patient: Wilber Pennington \"Wong\"    Procedure Information       Date/Time: 01/29/25 1040    Procedure: Extracorporeal Shockwave Lithotripsy (Right) - REP: [facility] - PATIENT WILL NEED LARGER BED    Location: [facility] / [facility]    Surgeons: Aleena Fowler MD            Relevant Problems   Cardiac   (+) Hyperlipidemia   (+) Hypertension      Pulmonary   (+) KARINA (obstructive sleep apnea)      Neuro   (+) Anxiety      Endocrine   (+) Obesity   (+) Type 2 diabetes mellitus      ID   (+) COVID-19       Clinical information reviewed:   Tobacco  Allergies  Meds   Med Hx  Surg Hx   Fam Hx  Soc Hx        NPO Detail:  NPO/Void Status  Carbohydrate Drink Given Prior to Surgery? : N  Date of Last Liquid: 01/29/25  Time of Last Liquid: 0700 (1/2 CUP CLEAR LIQUIDS)  Date of Last Solid: 01/28/25  Time of Last Solid: 2100  Last Intake Type: Clear fluids  Time of Last Void: 0800         Physical Exam    Airway  Mallampati: III  TM distance: >3 FB     Cardiovascular    Dental    Pulmonary    Abdominal            Anesthesia Plan    History of general anesthesia?: yes  History of complications of general anesthesia?: no    ASA 3     general     intravenous induction   Anesthetic plan and risks discussed with patient.      "

## 2025-01-29 NOTE — POST-PROCEDURE NOTE
1230-Patient brought back from PACU, alert and awake, denies any pain or nausea. Coffee and cookies provided per patient request. Patient's wife called to be brought to bedside. RX to go called. Patient denies any other needs at this time. Call light within reach.    1250-Patient tolerating cookies and coffee well, denies any nausea. Discharge instructions explained to patient and copy provided. Patient to follow up with doctor as scheduled. RX to go at the bedside.     1300-Vital signs obtained, WNL. IV site removed, tip intact, pressure applied, site bandaged. Urine strainers provided and patient educated on use. Patient getting dressed for discharge, wife remains at the bedside. Patient denies any needs.

## 2025-01-30 ASSESSMENT — PAIN SCALES - GENERAL: PAINLEVEL_OUTOF10: 2

## 2025-02-05 DIAGNOSIS — B37.2 YEAST INFECTION OF THE SKIN: ICD-10-CM

## 2025-02-05 PROCEDURE — RXMED WILLOW AMBULATORY MEDICATION CHARGE

## 2025-02-05 RX ORDER — NYSTATIN 100000 [USP'U]/G
1 POWDER TOPICAL 2 TIMES DAILY
Qty: 60 G | Refills: 1 | Status: SHIPPED | OUTPATIENT
Start: 2025-02-05

## 2025-02-06 ENCOUNTER — PHARMACY VISIT (OUTPATIENT)
Dept: PHARMACY | Facility: CLINIC | Age: 55
End: 2025-02-06
Payer: COMMERCIAL

## 2025-02-24 ENCOUNTER — APPOINTMENT (OUTPATIENT)
Dept: PRIMARY CARE | Facility: CLINIC | Age: 55
End: 2025-02-24
Payer: MEDICARE

## 2025-02-28 ENCOUNTER — PHARMACY VISIT (OUTPATIENT)
Dept: PHARMACY | Facility: CLINIC | Age: 55
End: 2025-02-28
Payer: COMMERCIAL

## 2025-02-28 DIAGNOSIS — E11.9 TYPE 2 DIABETES MELLITUS WITHOUT COMPLICATION, WITH LONG-TERM CURRENT USE OF INSULIN (MULTI): ICD-10-CM

## 2025-02-28 DIAGNOSIS — Z79.4 TYPE 2 DIABETES MELLITUS WITHOUT COMPLICATION, WITH LONG-TERM CURRENT USE OF INSULIN (MULTI): ICD-10-CM

## 2025-02-28 PROCEDURE — RXMED WILLOW AMBULATORY MEDICATION CHARGE

## 2025-02-28 RX ORDER — INSULIN DEGLUDEC 100 U/ML
INJECTION, SOLUTION SUBCUTANEOUS
Qty: 45 ML | Refills: 1 | Status: SHIPPED | OUTPATIENT
Start: 2025-02-28 | End: 2026-02-28

## 2025-03-18 ENCOUNTER — TELEPHONE (OUTPATIENT)
Dept: PRIMARY CARE | Facility: CLINIC | Age: 55
End: 2025-03-18
Payer: MEDICARE

## 2025-03-18 DIAGNOSIS — I10 ESSENTIAL HYPERTENSION: ICD-10-CM

## 2025-03-18 RX ORDER — POTASSIUM CHLORIDE 750 MG/1
10 TABLET, FILM COATED, EXTENDED RELEASE ORAL
Qty: 180 TABLET | Refills: 1 | Status: SHIPPED | OUTPATIENT
Start: 2025-03-18

## 2025-03-19 DIAGNOSIS — I10 PRIMARY HYPERTENSION: ICD-10-CM

## 2025-03-19 RX ORDER — LOSARTAN POTASSIUM 50 MG/1
50 TABLET ORAL DAILY
Qty: 100 TABLET | Refills: 0 | Status: SHIPPED | OUTPATIENT
Start: 2025-03-19 | End: 2025-10-05

## 2025-03-31 ENCOUNTER — OFFICE VISIT (OUTPATIENT)
Dept: PRIMARY CARE | Facility: CLINIC | Age: 55
End: 2025-03-31
Payer: MEDICARE

## 2025-03-31 VITALS
OXYGEN SATURATION: 94 % | HEIGHT: 70 IN | HEART RATE: 91 BPM | DIASTOLIC BLOOD PRESSURE: 92 MMHG | BODY MASS INDEX: 45.1 KG/M2 | TEMPERATURE: 98 F | SYSTOLIC BLOOD PRESSURE: 170 MMHG | WEIGHT: 315 LBS

## 2025-03-31 DIAGNOSIS — E55.9 VITAMIN D DEFICIENCY: ICD-10-CM

## 2025-03-31 DIAGNOSIS — Z00.00 ANNUAL PHYSICAL EXAM: Primary | ICD-10-CM

## 2025-03-31 DIAGNOSIS — N18.31 TYPE 2 DIABETES MELLITUS WITH STAGE 3A CHRONIC KIDNEY DISEASE, WITH LONG-TERM CURRENT USE OF INSULIN (MULTI): ICD-10-CM

## 2025-03-31 DIAGNOSIS — I10 ESSENTIAL HYPERTENSION: ICD-10-CM

## 2025-03-31 DIAGNOSIS — E78.2 MIXED HYPERLIPIDEMIA: ICD-10-CM

## 2025-03-31 DIAGNOSIS — I10 PRIMARY HYPERTENSION: ICD-10-CM

## 2025-03-31 DIAGNOSIS — Z79.4 TYPE 2 DIABETES MELLITUS WITH STAGE 3A CHRONIC KIDNEY DISEASE, WITH LONG-TERM CURRENT USE OF INSULIN (MULTI): ICD-10-CM

## 2025-03-31 DIAGNOSIS — Z12.5 SCREENING FOR PROSTATE CANCER: ICD-10-CM

## 2025-03-31 DIAGNOSIS — F41.9 ANXIETY: ICD-10-CM

## 2025-03-31 DIAGNOSIS — E11.22 TYPE 2 DIABETES MELLITUS WITH STAGE 3A CHRONIC KIDNEY DISEASE, WITH LONG-TERM CURRENT USE OF INSULIN (MULTI): ICD-10-CM

## 2025-03-31 DIAGNOSIS — G47.33 OSA (OBSTRUCTIVE SLEEP APNEA): ICD-10-CM

## 2025-03-31 PROCEDURE — 99396 PREV VISIT EST AGE 40-64: CPT | Performed by: INTERNAL MEDICINE

## 2025-03-31 PROCEDURE — 3080F DIAST BP >= 90 MM HG: CPT | Performed by: INTERNAL MEDICINE

## 2025-03-31 PROCEDURE — 99213 OFFICE O/P EST LOW 20 MIN: CPT | Performed by: INTERNAL MEDICINE

## 2025-03-31 PROCEDURE — 3046F HEMOGLOBIN A1C LEVEL >9.0%: CPT | Performed by: INTERNAL MEDICINE

## 2025-03-31 PROCEDURE — 3008F BODY MASS INDEX DOCD: CPT | Performed by: INTERNAL MEDICINE

## 2025-03-31 PROCEDURE — 3077F SYST BP >= 140 MM HG: CPT | Performed by: INTERNAL MEDICINE

## 2025-03-31 PROCEDURE — 4010F ACE/ARB THERAPY RXD/TAKEN: CPT | Performed by: INTERNAL MEDICINE

## 2025-03-31 RX ORDER — BUSPIRONE HYDROCHLORIDE 10 MG/1
10 TABLET ORAL 2 TIMES DAILY
Qty: 180 TABLET | Refills: 1 | Status: SHIPPED | OUTPATIENT
Start: 2025-03-31

## 2025-03-31 RX ORDER — TIRZEPATIDE 2.5 MG/.5ML
2.5 INJECTION, SOLUTION SUBCUTANEOUS WEEKLY
Qty: 2 ML | Refills: 2 | Status: SHIPPED | OUTPATIENT
Start: 2025-03-31 | End: 2025-03-31

## 2025-03-31 RX ORDER — TIRZEPATIDE 2.5 MG/.5ML
2.5 INJECTION, SOLUTION SUBCUTANEOUS WEEKLY
Qty: 2 ML | Refills: 2 | Status: SHIPPED | OUTPATIENT
Start: 2025-03-31

## 2025-03-31 RX ORDER — AMLODIPINE BESYLATE 10 MG/1
10 TABLET ORAL DAILY
Qty: 90 TABLET | Refills: 1 | Status: SHIPPED | OUTPATIENT
Start: 2025-03-31

## 2025-03-31 RX ORDER — METOPROLOL SUCCINATE 50 MG/1
50 TABLET, EXTENDED RELEASE ORAL DAILY
Qty: 225 TABLET | Refills: 1 | Status: SHIPPED | OUTPATIENT
Start: 2025-03-31

## 2025-03-31 RX ORDER — HYDROCHLOROTHIAZIDE 25 MG/1
25 TABLET ORAL DAILY
Qty: 90 TABLET | Refills: 1 | Status: SHIPPED | OUTPATIENT
Start: 2025-03-31

## 2025-03-31 RX ORDER — TIRZEPATIDE 2.5 MG/.5ML
2.5 INJECTION, SOLUTION SUBCUTANEOUS WEEKLY
Qty: 2 ML | Refills: 0 | Status: SHIPPED | OUTPATIENT
Start: 2025-03-31 | End: 2025-03-31

## 2025-03-31 ASSESSMENT — ENCOUNTER SYMPTOMS
NUMBNESS: 0
WEAKNESS: 0
TREMORS: 0
LOSS OF SENSATION IN FEET: 0
POLYPHAGIA: 0
OCCASIONAL FEELINGS OF UNSTEADINESS: 0
PALPITATIONS: 0
SHORTNESS OF BREATH: 0
POLYDIPSIA: 0
SINUS PAIN: 0
ARTHRALGIAS: 0
COUGH: 0
FATIGUE: 0
CONSTIPATION: 0
WHEEZING: 0
NERVOUS/ANXIOUS: 0
HEMATURIA: 0
BLOOD IN STOOL: 0
JOINT SWELLING: 0
ABDOMINAL PAIN: 0
SORE THROAT: 0
DEPRESSION: 0
UNEXPECTED WEIGHT CHANGE: 0

## 2025-03-31 ASSESSMENT — PROMIS GLOBAL HEALTH SCALE
RATE_MENTAL_HEALTH: FAIR
RATE_AVERAGE_FATIGUE: MODERATE
CARRYOUT_SOCIAL_ACTIVITIES: GOOD
RATE_QUALITY_OF_LIFE: GOOD
RATE_AVERAGE_PAIN: 5
RATE_SOCIAL_SATISFACTION: GOOD
EMOTIONAL_PROBLEMS: SOMETIMES
CARRYOUT_PHYSICAL_ACTIVITIES: MODERATELY
RATE_GENERAL_HEALTH: POOR
RATE_PHYSICAL_HEALTH: POOR

## 2025-03-31 ASSESSMENT — PAIN SCALES - GENERAL: PAINLEVEL_OUTOF10: 3

## 2025-03-31 NOTE — PROGRESS NOTES
Subjective   Patient ID: Wong Pennington is a 55 y.o. male who presents for Annual Exam.    HPI      Has history of hypertension, hyperlipidemia, obstructive sleep apnea on CPAP, gout, anxiety.           H/O diabetes mellitus- Been checking blood sugars daily since ER visit, FBS range from 110-150  Stopped rebylsus early 2022, as it was making his urine color tan. Denied any hematuria. Denied any hypoglycemic episodes. Started Trulicity in mid 2022.  Which he stopped early 2024, as co-pay was too high            H/O gout- symptoms well controlled on current meds.         H/O KARINA on CPAP- compliant with CPAP machine. Uses it every night. Sees Dr. Shook.         H/O- stress, anxiety. He attributed this to his work. Stated people's problems affect him to a point where he takes personal days off from work. Stated he used to see a psychologist many years ago.    Interim history 08/12     Presented to ER 8/12 with fevers, chills, shortness of breath and hypoxia. Originally thought to have pneumonia, but evaluation revealed UTI and bacteremia. Sepsis protocol started and ID consulted. IV antibiotics given. Patient hyperglycemic on arrival and throughout hospital stay in spite of treatment. Recommended outpatient evaluation by endocrinology. Renal function deteriorated, and nephrology was consulted. Patient found to have non-obstructive kidney stones and scrotal abscess as well. Urology consulted. Abscess spontaneously drained and CT scan of abdomen revealed no hydronephrosis or concern for obstruction. WBC returned to normal quickly and patient has now been afebrile for about 36 hours. Renal function returned to normal. Cleared for discharge by consultants with oral antibiotics per ID recommendations and follow up with PCP, urology and endocrinology recommended.     Hydrochlorothiazide and lisinopril were stopped during hospitalization due to kidney function    Review of Systems   Constitutional:  Negative for fatigue  "and unexpected weight change.   HENT:  Negative for congestion, ear pain, sinus pain and sore throat.    Respiratory:  Negative for cough, shortness of breath and wheezing.    Cardiovascular:  Negative for chest pain, palpitations and leg swelling.   Gastrointestinal:  Negative for abdominal pain, blood in stool and constipation.   Endocrine: Negative for polydipsia, polyphagia and polyuria.   Genitourinary:  Negative for hematuria and urgency.   Musculoskeletal:  Negative for arthralgias and joint swelling.   Skin:  Negative for rash.   Neurological:  Negative for tremors, syncope, weakness and numbness.   Psychiatric/Behavioral:  Positive for behavioral problems. The patient is not nervous/anxious.        Objective   BP (!) 170/92 (BP Location: Left arm, Patient Position: Sitting, BP Cuff Size: Large adult)   Pulse 91   Temp 36.7 °C (98 °F) (Temporal)   Ht 1.778 m (5' 10\")   Wt (!) 188 kg (415 lb)   SpO2 94%   BMI 59.55 kg/m²     Physical Exam  Constitutional:       Appearance: Normal appearance. He is obese.   HENT:      Head: Normocephalic and atraumatic.   Eyes:      Extraocular Movements: Extraocular movements intact.      Conjunctiva/sclera: Conjunctivae normal.   Cardiovascular:      Rate and Rhythm: Normal rate and regular rhythm.      Heart sounds: No murmur heard.  Pulmonary:      Effort: Pulmonary effort is normal. No respiratory distress.      Breath sounds: Normal breath sounds.   Abdominal:      General: Abdomen is flat. Bowel sounds are normal.      Palpations: Abdomen is soft.   Musculoskeletal:      Right lower leg: No edema.      Left lower leg: No edema.      Comments: Chronic venous stasis changes bilaterally   Lymphadenopathy:      Cervical: No cervical adenopathy.   Neurological:      Mental Status: He is alert and oriented to person, place, and time.      Cranial Nerves: No cranial nerve deficit.   Psychiatric:         Mood and Affect: Mood normal.         Assessment/Plan        Wilber " "\"Wong\" was seen today for annual exam.  Diagnoses and all orders for this visit:  Annual physical exam (Primary)  Essential hypertension  -     amLODIPine (Norvasc) 10 mg tablet; Take 1 tablet (10 mg) by mouth once daily.  -     CBC and Auto Differential; Future  -     Comprehensive Metabolic Panel; Future  Primary hypertension  -     metoprolol succinate XL (Toprol-XL) 50 mg 24 hr tablet; Take 1 tablet (50 mg) by mouth once daily. Do not crush or chew.  -     hydroCHLOROthiazide (HYDRODiuril) 25 mg tablet; Take 1 tablet (25 mg) by mouth once daily.  Anxiety  -     busPIRone (Buspar) 10 mg tablet; Take 1 tablet (10 mg) by mouth 2 times a day.  -     TSH with reflex to Free T4 if abnormal; Future  Type 2 diabetes mellitus with stage 3a chronic kidney disease, with long-term current use of insulin (Multi)  -     Hemoglobin A1C; Future  -     Albumin-Creatinine Ratio, Urine Random; Future  -     Referral to Endocrinology; Future  -     Discontinue: tirzepatide (Mounjaro) 2.5 mg/0.5 mL pen injector; Inject 2.5 mg under the skin 1 (one) time per week.  -     Discontinue: tirzepatide (Mounjaro) 2.5 mg/0.5 mL pen injector; Inject 2.5 mg under the skin 1 (one) time per week.  -     tirzepatide (Mounjaro) 2.5 mg/0.5 mL pen injector; Inject 2.5 mg under the skin 1 (one) time per week.  -     Referral to Clinical Pharmacy; Future  Mixed hyperlipidemia  -     Lipid Panel; Future  KARINA (obstructive sleep apnea)  Screening for prostate cancer  -     Prostate Specific Antigen, Screen; Future  Vitamin D deficiency  -     Vitamin D 25-Hydroxy,Total (for eval of Vitamin D levels); Future      Lab Results   Component Value Date    HGBA1C 10.9 (H) 01/16/2025        Stated his BP reading is always less than 140/80 at home  Continue to check blood pressure at home, our goal blood pressure is less than 140/80 reviewed home blood sugar readings ranging from 120-170  Reviewed recent labs with patient    Recommended weight loss, advised to " limit daily calories to less than 1800 malena a day    Using CPAP every night    Started on Mounjaro, discussed medication side effects  Refused endocrinology referral today    Current Outpatient Medications   Medication Instructions    albuterol (Ventolin HFA) 90 mcg/actuation inhaler 2 puffs, inhalation, Every 4 hours PRN    allopurinol (ZYLOPRIM) 300 mg, oral, Nightly    amLODIPine (NORVASC) 10 mg, oral, Daily    aspirin 81 mg, Daily    atorvastatin (LIPITOR) 10 mg, oral, Daily    busPIRone (BUSPAR) 10 mg, oral, 2 times daily    cholecalciferol (VITAMIN D-3) 1,000 Units, 2 times daily    cyanocobalamin (VITAMIN B-12) 1,000 mcg, Daily    fluticasone (Flonase) 50 mcg/actuation nasal spray INSTILL 1 SPRAY INTO EACH NOSTRIL ONE TIME DAILY AS NEEDED    glucosam/chond/hyalu/CF borate (MOVE FREE JOINT HEALTH ORAL) 1 tablet, 2 times daily    hydroCHLOROthiazide (HYDRODIURIL) 25 mg, oral, Daily    insulin degludec (Tresiba FlexTouch U-100) 100 unit/mL (3 mL) pen INJECT 70 UNITS SUBCUTANEOUSLY ONE TIME DAILY AS DIRECTED    krill/om-3/dha/epa/phospho/ast (MEGARED OMEGA-3 KRILL OIL ORAL) 500 mg, Daily    losartan (COZAAR) 50 mg, oral, Daily    metFORMIN (GLUCOPHAGE) 1,000 mg, oral, 2 times daily (morning and late afternoon)    metoprolol succinate XL (TOPROL-XL) 50 mg, oral, Daily, Do not crush or chew.    miscellaneous medical supply [facility] CPAP mask and supplies; 14 cmH2O with HH and a Respironics dreamwear medium FFM    Mounjaro 2.5 mg, subcutaneous, Weekly    multivitamin with minerals iron-free (Centrum Silver) 1 tablet, Daily    nystatin (Mycostatin) 100,000 unit/gram powder Apply 1 application topically 2 times a day.    potassium chloride CR (Klor-Con M10) 10 mEq ER tablet 10 mEq, oral, 2 times daily after meals

## 2025-04-01 PROCEDURE — RXMED WILLOW AMBULATORY MEDICATION CHARGE

## 2025-04-02 ENCOUNTER — PHARMACY VISIT (OUTPATIENT)
Dept: PHARMACY | Facility: CLINIC | Age: 55
End: 2025-04-02
Payer: COMMERCIAL

## 2025-04-17 DIAGNOSIS — E55.9 VITAMIN D DEFICIENCY: ICD-10-CM

## 2025-04-17 DIAGNOSIS — Z79.4 TYPE 2 DIABETES MELLITUS WITH STAGE 3A CHRONIC KIDNEY DISEASE, WITH LONG-TERM CURRENT USE OF INSULIN (MULTI): ICD-10-CM

## 2025-04-17 DIAGNOSIS — E11.22 TYPE 2 DIABETES MELLITUS WITH STAGE 3A CHRONIC KIDNEY DISEASE, WITH LONG-TERM CURRENT USE OF INSULIN (MULTI): ICD-10-CM

## 2025-04-17 DIAGNOSIS — N18.31 TYPE 2 DIABETES MELLITUS WITH STAGE 3A CHRONIC KIDNEY DISEASE, WITH LONG-TERM CURRENT USE OF INSULIN (MULTI): ICD-10-CM

## 2025-04-17 DIAGNOSIS — F41.9 ANXIETY: ICD-10-CM

## 2025-04-17 DIAGNOSIS — E78.2 MIXED HYPERLIPIDEMIA: ICD-10-CM

## 2025-04-17 DIAGNOSIS — I10 ESSENTIAL HYPERTENSION: ICD-10-CM

## 2025-04-17 DIAGNOSIS — Z12.5 SCREENING FOR PROSTATE CANCER: ICD-10-CM

## 2025-04-18 ENCOUNTER — APPOINTMENT (OUTPATIENT)
Dept: PHARMACY | Facility: HOSPITAL | Age: 55
End: 2025-04-18
Payer: MEDICARE

## 2025-04-18 DIAGNOSIS — E11.22 TYPE 2 DIABETES MELLITUS WITH STAGE 3A CHRONIC KIDNEY DISEASE, WITH LONG-TERM CURRENT USE OF INSULIN (MULTI): ICD-10-CM

## 2025-04-18 DIAGNOSIS — N18.31 TYPE 2 DIABETES MELLITUS WITH STAGE 3A CHRONIC KIDNEY DISEASE, WITH LONG-TERM CURRENT USE OF INSULIN (MULTI): ICD-10-CM

## 2025-04-18 DIAGNOSIS — Z79.4 TYPE 2 DIABETES MELLITUS WITH STAGE 3A CHRONIC KIDNEY DISEASE, WITH LONG-TERM CURRENT USE OF INSULIN (MULTI): ICD-10-CM

## 2025-04-18 RX ORDER — BLOOD-GLUCOSE SENSOR
EACH MISCELLANEOUS
Qty: 3 EACH | Refills: 11 | Status: SHIPPED | OUTPATIENT
Start: 2025-04-18

## 2025-04-18 RX ORDER — TIRZEPATIDE 5 MG/.5ML
5 INJECTION, SOLUTION SUBCUTANEOUS
Qty: 2 ML | Refills: 3 | Status: SHIPPED | OUTPATIENT
Start: 2025-04-18

## 2025-04-18 NOTE — Clinical Note
Dr. Anibal Dueñas I spoke with Mr. Pennington about his diabetes and Mounjaro therapy. It is going very well, he is having no side effects. His reported FBS are elevated so a dose increase was sent to start after 1 month on the 2.5mg dose. He is interested in a CGM so we are looking into that as well. Please let me know if any questions!

## 2025-04-21 ENCOUNTER — PATIENT MESSAGE (OUTPATIENT)
Dept: PHARMACY | Facility: HOSPITAL | Age: 55
End: 2025-04-21
Payer: MEDICARE

## 2025-04-21 DIAGNOSIS — N18.31 TYPE 2 DIABETES MELLITUS WITH STAGE 3A CHRONIC KIDNEY DISEASE, WITH LONG-TERM CURRENT USE OF INSULIN (MULTI): Primary | ICD-10-CM

## 2025-04-21 DIAGNOSIS — E11.22 TYPE 2 DIABETES MELLITUS WITH STAGE 3A CHRONIC KIDNEY DISEASE, WITH LONG-TERM CURRENT USE OF INSULIN (MULTI): Primary | ICD-10-CM

## 2025-04-21 DIAGNOSIS — Z79.4 TYPE 2 DIABETES MELLITUS WITH STAGE 3A CHRONIC KIDNEY DISEASE, WITH LONG-TERM CURRENT USE OF INSULIN (MULTI): Primary | ICD-10-CM

## 2025-04-21 RX ORDER — BLOOD-GLUCOSE SENSOR
EACH MISCELLANEOUS
Qty: 2 EACH | Refills: 11 | Status: SHIPPED | OUTPATIENT
Start: 2025-04-21

## 2025-04-25 PROCEDURE — RXMED WILLOW AMBULATORY MEDICATION CHARGE

## 2025-04-26 ENCOUNTER — PHARMACY VISIT (OUTPATIENT)
Dept: PHARMACY | Facility: CLINIC | Age: 55
End: 2025-04-26
Payer: COMMERCIAL

## 2025-04-29 ENCOUNTER — TELEPHONE (OUTPATIENT)
Dept: PRIMARY CARE | Facility: CLINIC | Age: 55
End: 2025-04-29
Payer: MEDICARE

## 2025-04-29 DIAGNOSIS — M25.552 ACUTE HIP PAIN, LEFT: ICD-10-CM

## 2025-05-01 ASSESSMENT — ENCOUNTER SYMPTOMS
RESPIRATORY NEGATIVE: 1
ARTHRALGIAS: 1
CARDIOVASCULAR NEGATIVE: 1
MYALGIAS: 1
CONSTITUTIONAL NEGATIVE: 1

## 2025-05-01 NOTE — PATIENT INSTRUCTIONS
May use PRICE therapy as needed.  Start into Physical Therapy 1-2 times a week for 8-10 weeks with manual therapy as well as dry needling and IASTM  Stressed the importance of wearing shoes with good stability control to help with the biomechanics affecting the lower legs  Stressed the importance of wearing full foot insoles to help with the biomechanics affecting the lower legs  Recommendation over-the-counter calcium 500mg, 3 times a day with vitamin-D3 8564-1496+ units a day with food as well as a daily multivitamin  Recommendation over-the-counter Move Free for joint health  May take OTC Tylenol Extra Strength or OTC Tylenol Arthritis, taking one every 6-8 hours with food as needed for pain management.  Patient advised regarding the risk and/or potential adverse reactions and/or side effects of any prescribed medications along with any over-the-counter medications or any supplements used. Patient advised to seek immediate medical care if any adverse reactions occur. The patient and/or patient(s) parent(s) verbalized their understanding.  Discussed in detail with the patient to the level of their understanding the possibility in the future of regenerative injections versus corticosteroids injections  Possibility in future of MRI/MR Arthrogram of LEFT HIP to rule out tendon vs ligament vs labral tear vs fracture vs other. MSK to read   Follow up 6 WEEKS

## 2025-05-01 NOTE — PROGRESS NOTES
"New patient  History Of Present Illness  Wilber Pennington \"Wong\" is a 55 y.o. male presenting with LEFT hip pain. Pt works as a full time . Pt states that for the past three weeks he started to experience a soreness that has since worsened. He notes that he now has to frequently take Motrin throughout the day multiple times in order to get through the day. Limited range of motion due to pain, with abduction being impossible on his worse days. He has difficulty walking long distances without having to take a break. Pain has also noticed that he has hamstring tightness in the mornings, but feels that it might be due to change in gait. He has noticed change in activities of daily living depending on pain and how long he has to be on his feet. Pain primarily posterior from glute in to hamstring. Denies any numbness or tingling. No previous history of injury. Rates current pain as a 2-3/10 and at worst up to a 7/10.  We discussed treatment options.  Upon exam patient has indications of a degenerative joint disease with restriction of the hip in multiple fields.  Patient also has some indications of straining of the hip and pelvis.  Patient's exam of his lumbar spine was largely unremarkable but there is possibility of associated pathology due to patient's body habitus as well as sudden onset of pain.  As such after discussion we will order x-rays of the patient's hip and spine and have patient start into physical therapy as soon as he is able.  We can follow-up in 6 weeks for further evaluation or sooner as needed.  Patient can continue with current over-the-counter medications he has been using for pain.  Patient verbalizes understanding and agreement with plan of care.    Past Medical History  He has a past medical history of Acute cystitis without hematuria (08/14/2024), Anxiety, Cerebral vascular accident (Multi) (2008), Diabetes mellitus (Multi), Eating disorder, Eczema, Gout, Hypertension, KARINA (obstructive " "sleep apnea), Pneumonia (08/12/2024), Venous stasis of both lower extremities, Visual impairment, and Yeast infection of the skin (08/12/2024).    Surgical History  He has a past surgical history that includes Vasectomy; Eye surgery (Right); and Extracorporeal shock wave lithotripsy (12/2024).     Social History  He reports that he has never smoked. He has never used smokeless tobacco. He reports current alcohol use of about 1.0 standard drink of alcohol per week. He reports that he does not use drugs.    Family History  Family History[1]     Allergies  Patient has no known allergies.    Review of Systems  Review of Systems   Constitutional: Negative.    Respiratory: Negative.     Cardiovascular: Negative.    Musculoskeletal:  Positive for arthralgias and myalgias.   All other systems reviewed and are negative.    Last Recorded Vitals  /82 (BP Location: Right arm, Patient Position: Sitting, BP Cuff Size: Large adult)   Pulse 88   Ht 1.473 m (4' 10\")   Wt (!) 188 kg (415 lb)   BMI 86.74 kg/m²      The , SIMEON JOHNSON was present during today's visit and not limited to physical examination!    Examination:  Left Hip and/or Pelvis  Edema: Positive.   Ecchymosis/Bruising: Negative.   Percussion Test: Negative.   Tuning Fork Test: Negative.   Orientation: Symmetrical.     ROM: Positive   Internal Rotation (30-35 degrees) decreased, causes pain (15 degrees)  External Rotation (45-60 degrees)   Flexion (120 degrees) decreased, causes pain (100 degrees)  Extension (15-30 degrees)   ABduction (45-50 degrees)   ADduction (20-30 degrees) decreased, causes pain (0 degrees)  FROM Sacral Flexion and Sacral Extension.            Muscle Strength: Positive   +5/+5 Hamstring Flexion  +5/+5 Quadricep Extension         +4/+5 Hip Flexion Causes pain  +4/+5Hip Extension Causes pain                  +5/+5 Hip ABduction away from body   +3/+5 Hip ADduction towards body Causes pain          +3/+5 Hip Internal Rotation at " 90 Degrees Causes pain  +5/+5 Hip External Rotation at 90 Degrees                  +5/+5 Hip Internal Rotation at 0 Degrees   +5/+5 Hip External Rotation at 0 Degrees     DTR/Neurological:  Sensation Intact, 2-Point Discrimination: Negative.            Palpation:  Positive  tender to palpation over Left Hip Flexor, Glute(s), and Hip Joint           Vascular:   Negative: Normal Pulses   +2/+4, Femoral Artery, DP, PT  Capillary Refill < 2 seconds.            Function Tests:  Test for Rectus Femoris Contracture: Negative.   Hip Extension Test: Positive    Iliotibial Tract Test: Negative.   Walt :  Positive    Walt Test:  Positive    Oneil Test: Positive              Hip/Pelvis - Flexibility:  Hamstring Positive   Hip Flexor  Positive    IT-Band  Negative.         Hip/Pelvis - Hip Contractures:  Finger Tip Test: Negative.   Walt Test: Negative.   Walt  Test: Negative.   Piriformis Test: Negative.       Hip/Pelvis - Hip Dysplasia:  Trochanter Irritation Sign: Negative.   Galeazzi Test: Negative.   Kalchschmidt Test: Negative.            Hip/Pelvis - Impingement/Labrum:  SARITHA Test:  Negative.       FADIR Test:  Positive        Hip Scouring Test:  Positive        Rea's Test:  Positive        Posterior Labrum (Posterior Margin) Test:  Positive        Posterior Impingement (Posterior Labrum) [Torque] Test:  Positive        Anterior Impingement (Anterior Labrum) Test:  Negative.   Psoas Sign: Positive                     Hip/Pelvis - IT Band Syndrome:  Oneil's (Iliotibial Tract) Test: Negative.   Jamison Compression Test: Negative.   J-Sign: Negative.            Hip/Pelvis - SFE:  Drehmann Test: Negative.            Hip/Pelvis - Trochanteric/Pelvis Muscle Function:  Trendelenburg/Duchenne Sign: Negative.   Duchenne Sign: Negative.      Leg Length:  Leg Length Supine: Positive Will verify with standing erect pelvis xray   Leg Length Supine to Seated (Derbolowsky Sign): Positive Will verify with standing erect  pelvis xray     Feet/Foot:   Positive BILATERAL Valgus foot     Imaging and Diagnostics Review:  Plain radiograph imaging ordered and independently reviewed.  No acute fractures or dislocations noted however patient does appear to have degenerative disease of the bilateral hips as well as the lumbar spine.    Assessment   1. Osteoarthritis of left hip, unspecified osteoarthritis type    2. Hip strain, left, initial encounter    3. Muscle strain of left gluteal region, initial encounter    4. Quadriceps strain, left, initial encounter    5. Hamstring strain, left, initial encounter    6. Primary osteoarthritis of lumbar spine        Plan   Treatment or Intervention:  May use PRICE therapy as needed.  Start into Physical Therapy 1-2 times a week for 8-10 weeks with manual therapy as well as dry needling and IASTM  Stressed the importance of wearing shoes with good stability control to help with the biomechanics affecting the lower legs  Stressed the importance of wearing full foot insoles to help with the biomechanics affecting the lower legs  Recommendation over-the-counter calcium 500mg, 3 times a day with vitamin-D3 3657-5671+ units a day with food as well as a daily multivitamin  Recommendation over-the-counter Move Free for joint health  May take OTC Tylenol Extra Strength or OTC Tylenol Arthritis, taking one every 6-8 hours with food as needed for pain management.  Patient advised regarding the risk and/or potential adverse reactions and/or side effects of any prescribed medications along with any over-the-counter medications or any supplements used. Patient advised to seek immediate medical care if any adverse reactions occur. The patient and/or patient(s) parent(s) verbalized their understanding.  Discussed in detail with the patient to the level of their understanding the possibility in the future of regenerative injections versus corticosteroids injections  Possibility in future of MRI/MR Arthrogram of LEFT  HIP to rule out tendon vs ligament vs labral tear vs fracture vs other. MSK to read     Diagnostic studies:  XRAYs completed during today's visit however not read by radiology by end of visit    Activity Instructions, Restrictions, and Accommodations:      Consultations/Referrals:  Physical therapy    Follow-up:  Follow up 6 WEEKS  Total appointment time _45__ minutes. Greater than 50% spent counseling patient on results of physical exam, treatment options as well as reasons for ordered imaging and anticipated treatment for possible results, need for PT and expected outcomes, as well as discussing possible medications.     David Petty CNP           [1]   Family History  Problem Relation Name Age of Onset    Heart failure Mother          Congested    Other (cerebrovascular disease) Mother      Hypertension Mother      Heart failure Father Demetris         Congested    Coronary artery disease Father Demetris     Hypertension Father Demetris     COPD Father Demetris     Heart disease Father Demetris     Multiple sclerosis Sister      Other (Gluten sensitive enteropathy) Sister      Hypertension Brother      Other (Gluten sensitive enteropathy) Brother      Other (ADHD) Brother      Celiac disease Brother      Hypertension Brother

## 2025-05-07 ENCOUNTER — TELEPHONE (OUTPATIENT)
Dept: PRIMARY CARE | Facility: CLINIC | Age: 55
End: 2025-05-07
Payer: MEDICARE

## 2025-05-07 DIAGNOSIS — E11.9 TYPE 2 DIABETES MELLITUS WITHOUT COMPLICATION, WITHOUT LONG-TERM CURRENT USE OF INSULIN: ICD-10-CM

## 2025-05-07 RX ORDER — METFORMIN HYDROCHLORIDE 500 MG/1
1000 TABLET ORAL
Qty: 360 TABLET | Refills: 0 | Status: SHIPPED | OUTPATIENT
Start: 2025-05-07

## 2025-05-08 ENCOUNTER — HOSPITAL ENCOUNTER (OUTPATIENT)
Dept: RADIOLOGY | Facility: CLINIC | Age: 55
Discharge: HOME | End: 2025-05-08
Payer: MEDICARE

## 2025-05-08 ENCOUNTER — OFFICE VISIT (OUTPATIENT)
Dept: SPORTS MEDICINE | Facility: CLINIC | Age: 55
End: 2025-05-08
Payer: MEDICARE

## 2025-05-08 VITALS
BODY MASS INDEX: 61.84 KG/M2 | WEIGHT: 315 LBS | SYSTOLIC BLOOD PRESSURE: 150 MMHG | HEART RATE: 88 BPM | HEIGHT: 60 IN | DIASTOLIC BLOOD PRESSURE: 82 MMHG

## 2025-05-08 DIAGNOSIS — M47.816 PRIMARY OSTEOARTHRITIS OF LUMBAR SPINE: ICD-10-CM

## 2025-05-08 DIAGNOSIS — S76.012A HIP STRAIN, LEFT, INITIAL ENCOUNTER: ICD-10-CM

## 2025-05-08 DIAGNOSIS — S76.012A MUSCLE STRAIN OF LEFT GLUTEAL REGION, INITIAL ENCOUNTER: ICD-10-CM

## 2025-05-08 DIAGNOSIS — S76.112A QUADRICEPS STRAIN, LEFT, INITIAL ENCOUNTER: ICD-10-CM

## 2025-05-08 DIAGNOSIS — S76.312A HAMSTRING STRAIN, LEFT, INITIAL ENCOUNTER: ICD-10-CM

## 2025-05-08 DIAGNOSIS — M16.12 OSTEOARTHRITIS OF LEFT HIP, UNSPECIFIED OSTEOARTHRITIS TYPE: ICD-10-CM

## 2025-05-08 PROCEDURE — 4010F ACE/ARB THERAPY RXD/TAKEN: CPT | Performed by: NURSE PRACTITIONER

## 2025-05-08 PROCEDURE — 99214 OFFICE O/P EST MOD 30 MIN: CPT | Performed by: NURSE PRACTITIONER

## 2025-05-08 PROCEDURE — 3008F BODY MASS INDEX DOCD: CPT | Performed by: NURSE PRACTITIONER

## 2025-05-08 PROCEDURE — 72110 X-RAY EXAM L-2 SPINE 4/>VWS: CPT

## 2025-05-08 PROCEDURE — 3077F SYST BP >= 140 MM HG: CPT | Performed by: NURSE PRACTITIONER

## 2025-05-08 PROCEDURE — 73502 X-RAY EXAM HIP UNI 2-3 VIEWS: CPT | Mod: LT

## 2025-05-08 PROCEDURE — 3079F DIAST BP 80-89 MM HG: CPT | Performed by: NURSE PRACTITIONER

## 2025-05-08 PROCEDURE — 3046F HEMOGLOBIN A1C LEVEL >9.0%: CPT | Performed by: NURSE PRACTITIONER

## 2025-05-08 PROCEDURE — 99204 OFFICE O/P NEW MOD 45 MIN: CPT | Performed by: NURSE PRACTITIONER

## 2025-05-08 ASSESSMENT — PATIENT HEALTH QUESTIONNAIRE - PHQ9
SUM OF ALL RESPONSES TO PHQ9 QUESTIONS 1 AND 2: 0
2. FEELING DOWN, DEPRESSED OR HOPELESS: NOT AT ALL
1. LITTLE INTEREST OR PLEASURE IN DOING THINGS: NOT AT ALL

## 2025-05-08 ASSESSMENT — PAIN SCALES - GENERAL: PAINLEVEL_OUTOF10: 2

## 2025-05-08 ASSESSMENT — ENCOUNTER SYMPTOMS
OCCASIONAL FEELINGS OF UNSTEADINESS: 0
LOSS OF SENSATION IN FEET: 0
DEPRESSION: 0

## 2025-05-12 ENCOUNTER — TELEPHONE (OUTPATIENT)
Dept: PRIMARY CARE | Facility: CLINIC | Age: 55
End: 2025-05-12
Payer: MEDICARE

## 2025-05-12 DIAGNOSIS — E11.9 TYPE 2 DIABETES MELLITUS WITHOUT COMPLICATION, WITHOUT LONG-TERM CURRENT USE OF INSULIN: ICD-10-CM

## 2025-05-12 RX ORDER — METFORMIN HYDROCHLORIDE 500 MG/1
1000 TABLET ORAL
Qty: 360 TABLET | Refills: 0 | Status: SHIPPED | OUTPATIENT
Start: 2025-05-12

## 2025-05-13 ENCOUNTER — EVALUATION (OUTPATIENT)
Dept: PHYSICAL THERAPY | Facility: CLINIC | Age: 55
End: 2025-05-13
Payer: MEDICARE

## 2025-05-13 DIAGNOSIS — M47.816 PRIMARY OSTEOARTHRITIS OF LUMBAR SPINE: ICD-10-CM

## 2025-05-13 DIAGNOSIS — S76.312A HAMSTRING STRAIN, LEFT, INITIAL ENCOUNTER: ICD-10-CM

## 2025-05-13 DIAGNOSIS — S76.012A MUSCLE STRAIN OF LEFT GLUTEAL REGION, INITIAL ENCOUNTER: ICD-10-CM

## 2025-05-13 DIAGNOSIS — S76.012A HIP STRAIN, LEFT, INITIAL ENCOUNTER: ICD-10-CM

## 2025-05-13 DIAGNOSIS — M16.12 OSTEOARTHRITIS OF LEFT HIP, UNSPECIFIED OSTEOARTHRITIS TYPE: ICD-10-CM

## 2025-05-13 DIAGNOSIS — S76.112A QUADRICEPS STRAIN, LEFT, INITIAL ENCOUNTER: ICD-10-CM

## 2025-05-13 PROCEDURE — 97161 PT EVAL LOW COMPLEX 20 MIN: CPT | Mod: GP

## 2025-05-13 NOTE — PROGRESS NOTES
"Physical Therapy    Physical Therapy Evaluation and Treatment    Patient Name: Wilber Pennington \"Wong\"  MRN: [id number]  Encounter Date: 5/13/2025     Time Calculation  Start Time: 1400  Stop Time: 1440  Time Calculation (min): 40 min    Current Problem:   1. Osteoarthritis of left hip, unspecified osteoarthritis type  Referral to Physical Therapy    Follow Up In Physical Therapy    CANCELED: Follow Up In Physical Therapy      2. Hip strain, left, initial encounter  Referral to Physical Therapy    Follow Up In Physical Therapy    CANCELED: Follow Up In Physical Therapy      3. Muscle strain of left gluteal region, initial encounter  Referral to Physical Therapy    Follow Up In Physical Therapy    CANCELED: Follow Up In Physical Therapy      4. Quadriceps strain, left, initial encounter  Referral to Physical Therapy    Follow Up In Physical Therapy      5. Hamstring strain, left, initial encounter  Referral to Physical Therapy      6. Primary osteoarthritis of lumbar spine  Referral to Physical Therapy            SUBJECTIVE:   The patient is a full-time  who presents with left hip pain that began approximately three weeks ago with insidious onset. He initially experienced mild soreness, which has progressively worsened. Currently, he reports needing Motrin multiple times per day to manage his symptoms. Pain is described as posterior, localized from the gluteal region into the hamstring, without any associated numbness or tingling. The patient denies any previous injury to the area.    He reports difficulty walking long distances, requiring rest breaks, and limitations in activities of daily living (ADLs) depending on his pain level and time spent on his feet. On his worst days, hip abduction is not possible due to pain. He also notes hamstring tightness in the mornings, which he attributes to altered gait mechanics.    Imaging:   IMPRESSION:  Moderate left hip osteoarthritis     IMPRESSION:  Mild to moderate facet " disease and spondylosis throughout the lumbar  spine worse at the lower levels of the lumbar spine. No acute  abnormality seen      Pain:   Current: 3/10  Outcome Measures: WOMAC 34/96       OBJECTIVE:    Observation/Posture:  No gross deformity noted  Mild antalgic gait observed    ROM (Left Hip):  Flexion: Limited, painful  Extension: Limited  Abduction: Significantly limited; not tolerated on worst days  Internal/External Rotation: Limited with discomfort    Strength Testing (Left LE):  General weakness secondary to pain, particularly with resisted abduction and hip extension    Palpation:  Tenderness over gluteal region and proximal hamstring  No swelling or visible bruising noted    Special Tests:  Positive SARITHA with reproduction of posterior hip pain  Negative straight leg raise  Lumbar spine screen: Unremarkable, no significant reproduction of symptoms    Treatments:  Therapeutic Exercise: (15 minutes)   Hamstring stretch  Piriformis stretch  Hip fall outs     Dry needling + stim  0.30 x 0.75 mm needles placed along lateral L hip    Response to treatment: good     ASSESSMENT:   Patient presents with signs and symptoms consistent with degenerative joint changes of the left hip, including restricted ROM, functional limitations, and pain with activity. Additional findings suggest possible strain of the hip and pelvic musculature. Pt will benefit from additional skilled PT to address above impairments.    Complexity of Evaluation:  low   Based on the history including personal factors and/or comorbidities, examination of body systems including body structures and function, activity limitations, and/or participation restrictions, as well as clinical presentation, patient meets criteria for a low complexity evaluation.     PLAN:  OP PT PLAN:  Treatment/Interventions: Education/Instruction , Gait training , Manual Therapy  , Neuromuscular re-education , Therapeutic activities , and Therapeutic exercise    PT Plan:  Skilled PT   PT Frequency: 1 time per week   Duration: 4 weeks   Insurance: 2025: EVAL ONLY - [employer] - AUTH REQ / $9000 OOP not met / $35 COPAY / PT 20V/yr - 0 used / Per CS W/S / ds 5/11/25 //   Visits Approved: Eval only  Rehab Potential: Good  Plan of Care Agreement: Patient         Goals:   Restore full functional ROM of the left hip in all planes without pain or compensatory motion.    Improve left hip and pelvic girdle strength to at least 4+/5 in all major muscle groups.    Ambulate unlimited community distances without assistive device and without pain >=1/10.    Resume all prior ADLs including work duties as a full-time  without pain or functional limitation.    Normalize gait mechanics with equal step length and no observable deviations.    Reduce reliance on NSAIDs, reporting ability to manage symptoms without regular medication.

## 2025-05-19 ENCOUNTER — APPOINTMENT (OUTPATIENT)
Dept: PHYSICAL THERAPY | Facility: CLINIC | Age: 55
End: 2025-05-19
Payer: MEDICARE

## 2025-05-22 PROCEDURE — RXMED WILLOW AMBULATORY MEDICATION CHARGE

## 2025-05-23 ENCOUNTER — PHARMACY VISIT (OUTPATIENT)
Dept: PHARMACY | Facility: CLINIC | Age: 55
End: 2025-05-23
Payer: COMMERCIAL

## 2025-05-27 PROCEDURE — RXMED WILLOW AMBULATORY MEDICATION CHARGE

## 2025-05-29 ENCOUNTER — TREATMENT (OUTPATIENT)
Dept: PHYSICAL THERAPY | Facility: CLINIC | Age: 55
End: 2025-05-29
Payer: MEDICARE

## 2025-05-29 ENCOUNTER — PHARMACY VISIT (OUTPATIENT)
Dept: PHARMACY | Facility: CLINIC | Age: 55
End: 2025-05-29
Payer: COMMERCIAL

## 2025-05-29 DIAGNOSIS — S76.112A QUADRICEPS STRAIN, LEFT, INITIAL ENCOUNTER: ICD-10-CM

## 2025-05-29 DIAGNOSIS — S76.012A MUSCLE STRAIN OF LEFT GLUTEAL REGION, INITIAL ENCOUNTER: ICD-10-CM

## 2025-05-29 DIAGNOSIS — S76.012A HIP STRAIN, LEFT, INITIAL ENCOUNTER: ICD-10-CM

## 2025-05-29 DIAGNOSIS — M16.12 OSTEOARTHRITIS OF LEFT HIP, UNSPECIFIED OSTEOARTHRITIS TYPE: ICD-10-CM

## 2025-05-29 PROCEDURE — 97110 THERAPEUTIC EXERCISES: CPT | Mod: GP,CQ

## 2025-05-29 NOTE — PROGRESS NOTES
"Physical Therapy    Physical Therapy Treatment    Patient Name: Wilber Pennington \"Wong\"  MRN: [id number]  Encounter Date: 5/29/2025     Time Calculation  Start Time: 0930  Stop Time: 1015  Time Calculation (min): 45 min    Visit #: 2  out of 4  Insurance: 2025 Eval Only Auth recvd for 4 visits from 5.14.25-8.14.25. CPTs 02724, 39462, 03173, [id number], [id number]    2025: EVAL ONLY - [facility] [employer] - AUTH REQ / $9000 OOP not met / $35 COPAY / PT 20V/yr - 0 used / Per CS W/S / ds 5/11/25 //   Evaluation date: 5-13-25      Current Problem:   1. Osteoarthritis of left hip, unspecified osteoarthritis type  Follow Up In Physical Therapy      2. Hip strain, left, initial encounter  Follow Up In Physical Therapy      3. Muscle strain of left gluteal region, initial encounter  Follow Up In Physical Therapy      4. Quadriceps strain, left, initial encounter  Follow Up In Physical Therapy          SUBJECTIVE:   Pt reports feeling  soreness initially after his PT evaluation last session , a few hours later he did feel a little relief in sx's/ stiffness in left hip. 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[employer]) which requires him to step up 3 steps to get into truck  which irritates hip, currently high level sx's in left hip with weight bearing, 6/10      Precautions: see eval        Pain:   Start of session:    6/10    OBJECTIVE:    Antalgic gait pattern, reduced weight bearing through Left LE    Treatments:  Therapeutic Exercise: ( 40 minutes)   Supine bent knee fall out 1 x 10  Supine hamstring stretch with strap 20 sec x 3   Gluteal sets 2 x 10  Low lift bridge 2 x 10   Hooklying hip abduction green tb 2 x 10  Abd brace 2 x 10  Sit to stand from elevated mat 2 x 10  Standing hip extension with forward lean over mat 2 x 10 L/R   Right S/L for myofascial rolling to left IT/lateral hip x 5 min    Manual Therapy: ( minutes)    Gait Training: ( minutes)    Neuromuscular " Re-education: ( minutes)    Therapeutic Activity: ( minutes)       HEP:  Therapeutic Exercise: (15 minutes)   Hamstring stretch  Piriformis stretch  Hip fall outs     ASSESSMENT:   Favorable response to dry needling last session with short term reduction of sx level. Focused session on building core and proximal hip strengthening this session . May try lateral wall sag, dry needling next session     Post session pain:    Denies increase    PLAN:  OP PT PLAN:  Treatment/Interventions: Education/Instruction , Gait training , Manual Therapy  , Neuromuscular re-education , Therapeutic activities , and Therapeutic exercise    PT Plan: Skilled PT   PT Frequency: 1 time per week   Duration: 4 weeks   Insurance: 2025: EVAL ONLY - [employer] - AUTH REQ / $9000 OOP not met / $35 COPAY / PT 20V/yr - 0 used / Per CS W/S / ds 5/11/25 //   Visits Approved: Eval only  Rehab Potential: Good  Plan of Care Agreement: Patient         Goals:   Restore full functional ROM of the left hip in all planes without pain or compensatory motion.    Improve left hip and pelvic girdle strength to at least 4+/5 in all major muscle groups.    Ambulate unlimited community distances without assistive device and without pain >=1/10.    Resume all prior ADLs including work duties as a full-time  without pain or functional limitation.    Normalize gait mechanics with equal step length and no observable deviations.    Reduce reliance on NSAIDs, reporting ability to manage symptoms without regular medication.

## 2025-06-02 ENCOUNTER — APPOINTMENT (OUTPATIENT)
Dept: PHYSICAL THERAPY | Facility: CLINIC | Age: 55
End: 2025-06-02
Payer: MEDICARE

## 2025-06-02 ENCOUNTER — APPOINTMENT (OUTPATIENT)
Dept: PRIMARY CARE | Facility: CLINIC | Age: 55
End: 2025-06-02
Payer: MEDICARE

## 2025-06-05 ENCOUNTER — TELEPHONE (OUTPATIENT)
Dept: PRIMARY CARE | Facility: CLINIC | Age: 55
End: 2025-06-05
Payer: MEDICARE

## 2025-06-05 DIAGNOSIS — I10 PRIMARY HYPERTENSION: ICD-10-CM

## 2025-06-05 RX ORDER — HYDROCHLOROTHIAZIDE 25 MG/1
50 TABLET ORAL DAILY
Qty: 180 TABLET | Refills: 0 | Status: SHIPPED | OUTPATIENT
Start: 2025-06-05

## 2025-06-05 RX ORDER — METOPROLOL SUCCINATE 50 MG/1
125 TABLET, EXTENDED RELEASE ORAL DAILY
Qty: 225 TABLET | Refills: 0 | Status: SHIPPED | OUTPATIENT
Start: 2025-06-05

## 2025-06-09 ENCOUNTER — APPOINTMENT (OUTPATIENT)
Dept: PHYSICAL THERAPY | Facility: CLINIC | Age: 55
End: 2025-06-09
Payer: MEDICARE

## 2025-06-12 ASSESSMENT — ENCOUNTER SYMPTOMS
MYALGIAS: 1
ARTHRALGIAS: 1
RESPIRATORY NEGATIVE: 1
CONSTITUTIONAL NEGATIVE: 1
CARDIOVASCULAR NEGATIVE: 1

## 2025-06-12 NOTE — PATIENT INSTRUCTIONS
May use PRICE therapy as needed.  Continue Physical Therapy 1-2 times a week for 8-10 weeks with manual therapy as well as dry needling and IASTM  Again stressed the importance of wearing shoes with good stability control to help with the biomechanics affecting the lower legs  Again stressed the importance of wearing full foot insoles to help with the biomechanics affecting the lower legs  Recommendation over-the-counter calcium 500mg, 3 times a day with vitamin-D3 2190-0597+ units a day with food as well as a daily multivitamin  Recommendation over-the-counter Move Free for joint health  May take OTC Tylenol Extra Strength or OTC Tylenol Arthritis, taking one every 6-8 hours with food as needed for pain management.  Patient advised regarding the risk and/or potential adverse reactions and/or side effects of any prescribed medications along with any over-the-counter medications or any supplements used. Patient advised to seek immediate medical care if any adverse reactions occur. The patient and/or patient(s) parent(s) verbalized their understanding.  Discussed in detail with the patient to the level of their understanding the possibility in the future of regenerative injections versus corticosteroids injections  Possibility in future of MRI/MR Arthrogram of LEFT HIP to rule out tendon vs ligament vs labral tear vs fracture vs other. MSK to read   Follow up

## 2025-06-12 NOTE — PROGRESS NOTES
"Established patient  History Of Present Illness  Wilber Pennington \"Wong\" is a 55 y.o. male presenting for his follow up evaluation of his LEFT hip. Since last visit in office, patient states that he feels    Past Medical History  He has a past medical history of Acute cystitis without hematuria (08/14/2024), Anxiety, Cerebral vascular accident (Multi) (2008), Diabetes mellitus (Multi), Eating disorder, Eczema, Gout, Hypertension, KARINA (obstructive sleep apnea), Pneumonia (08/12/2024), Venous stasis of both lower extremities, Visual impairment, and Yeast infection of the skin (08/12/2024).    Surgical History  He has a past surgical history that includes Vasectomy; Eye surgery (Right); and Extracorporeal shock wave lithotripsy (12/2024).     Social History  He reports that he has never smoked. He has never used smokeless tobacco. He reports current alcohol use of about 1.0 standard drink of alcohol per week. He reports that he does not use drugs.    Family History  Family History[1]     Allergies  Patient has no known allergies.    Review of Systems  Review of Systems   Constitutional: Negative.    Respiratory: Negative.     Cardiovascular: Negative.    Musculoskeletal:  Positive for arthralgias and myalgias.   All other systems reviewed and are negative.    Last Recorded Vitals  There were no vitals taken for this visit.     The , SIMEON JOHNSON was present during today's visit and not limited to physical examination!    Examination:  Left Hip and/or Pelvis  Edema: Positive.   Ecchymosis/Bruising: Negative.   Percussion Test: Negative.   Tuning Fork Test: Negative.   Orientation: Symmetrical.     ROM: Positive   Internal Rotation (30-35 degrees) decreased, causes pain (15 degrees)  External Rotation (45-60 degrees)   Flexion (120 degrees) decreased, causes pain (100 degrees)  Extension (15-30 degrees)   ABduction (45-50 degrees)   ADduction (20-30 degrees) decreased, causes pain (0 degrees)  FROM Sacral " Flexion and Sacral Extension.            Muscle Strength: Positive   +5/+5 Hamstring Flexion  +5/+5 Quadricep Extension         +4/+5 Hip Flexion Causes pain  +4/+5Hip Extension Causes pain                  +5/+5 Hip ABduction away from body   +3/+5 Hip ADduction towards body Causes pain          +3/+5 Hip Internal Rotation at 90 Degrees Causes pain  +5/+5 Hip External Rotation at 90 Degrees                  +5/+5 Hip Internal Rotation at 0 Degrees   +5/+5 Hip External Rotation at 0 Degrees     DTR/Neurological:  Sensation Intact, 2-Point Discrimination: Negative.            Palpation:  Positive  tender to palpation over Left Hip Flexor, Glute(s), and Hip Joint           Vascular:   Negative: Normal Pulses   +2/+4, Femoral Artery, DP, PT  Capillary Refill < 2 seconds.            Function Tests:  Test for Rectus Femoris Contracture: Negative.   Hip Extension Test: Positive    Iliotibial Tract Test: Negative.   Walt :  Positive    Walt Test:  Positive    Oneil Test: Positive              Hip/Pelvis - Flexibility:  Hamstring Positive   Hip Flexor  Positive    IT-Band  Negative.         Hip/Pelvis - Hip Contractures:  Finger Tip Test: Negative.   Walt Test: Negative.   Walt  Test: Negative.   Piriformis Test: Negative.       Hip/Pelvis - Hip Dysplasia:  Trochanter Irritation Sign: Negative.   Galeazzi Test: Negative.   Kalchschmidt Test: Negative.            Hip/Pelvis - Impingement/Labrum:  SARITHA Test:  Negative.       FADIR Test:  Positive        Hip Scouring Test:  Positive        Rea's Test:  Positive        Posterior Labrum (Posterior Margin) Test:  Positive        Posterior Impingement (Posterior Labrum) [Torque] Test:  Positive        Anterior Impingement (Anterior Labrum) Test:  Negative.   Psoas Sign: Positive                     Hip/Pelvis - IT Band Syndrome:  Oneil's (Iliotibial Tract) Test: Negative.   Jamison Compression Test: Negative.   J-Sign: Negative.            Hip/Pelvis -  SFE:  Drehmann Test: Negative.            Hip/Pelvis - Trochanteric/Pelvis Muscle Function:  Trendelenburg/Duchenne Sign: Negative.   Duchenne Sign: Negative.      Leg Length:  Leg Length Supine: Positive Will verify with standing erect pelvis xray   Leg Length Supine to Seated (Derbolowsky Sign): Positive Will verify with standing erect pelvis xray     Feet/Foot:   Positive BILATERAL Valgus foot     Imaging and Diagnostics Review:  Plain radiograph imaging ordered and independently reviewed.  No acute fractures or dislocations noted however patient does appear to have degenerative disease of the bilateral hips as well as the lumbar spine.    Assessment   No diagnosis found.      Plan   Treatment or Intervention:  May use PRICE therapy as needed.  Continue Physical Therapy 1-2 times a week for 8-10 weeks with manual therapy as well as dry needling and IASTM  Again stressed the importance of wearing shoes with good stability control to help with the biomechanics affecting the lower legs  Again stressed the importance of wearing full foot insoles to help with the biomechanics affecting the lower legs  Recommendation over-the-counter calcium 500mg, 3 times a day with vitamin-D3 8799-7700+ units a day with food as well as a daily multivitamin  Recommendation over-the-counter Move Free for joint health  May take OTC Tylenol Extra Strength or OTC Tylenol Arthritis, taking one every 6-8 hours with food as needed for pain management.  Patient advised regarding the risk and/or potential adverse reactions and/or side effects of any prescribed medications along with any over-the-counter medications or any supplements used. Patient advised to seek immediate medical care if any adverse reactions occur. The patient and/or patient(s) parent(s) verbalized their understanding.  Discussed in detail with the patient to the level of their understanding the possibility in the future of regenerative injections versus corticosteroids  injections  Possibility in future of MRI/MR Arthrogram of LEFT HIP to rule out tendon vs ligament vs labral tear vs fracture vs other. MSK to read     Diagnostic studies:  Interpreted By:  Mateo Godwin,   STUDY: XR HIP LEFT WITH PELVIS WHEN PERFORMED 2 OR 3 VIEWS; ;  5/8/2025 2:47 pm      INDICATION:  Signs/Symptoms:LEFT HIP PAIN.      ORDERING CLINICIAN: GURU PETTY      FINDINGS:  Left hip, three views      There is moderate joint space narrowing with osteophytosis in the  left hip. Mild right hip osteophytosis. There is no fracture or  dislocation seen      IMPRESSION:  Moderate left hip osteoarthritis    Signed by: Mateo Godwin 5/9/2025 6:46 PM  Dictation workstation:   [id number]    Activity Instructions, Restrictions, and Accommodations:      Consultations/Referrals:  Physical therapy    Follow-up:  Follow up     Guru Petty CNP           [1]   Family History  Problem Relation Name Age of Onset    Heart failure Mother          Congested    Other (cerebrovascular disease) Mother      Hypertension Mother      Heart failure Father Demetris         Congested    Coronary artery disease Father Demetris     Hypertension Father Demetris     COPD Father Demetris     Heart disease Father Demetris     Multiple sclerosis Sister      Other (Gluten sensitive enteropathy) Sister      Hypertension Brother      Other (Gluten sensitive enteropathy) Brother      Other (ADHD) Brother      Celiac disease Brother      Hypertension Brother

## 2025-06-16 DIAGNOSIS — E11.9 TYPE 2 DIABETES MELLITUS WITHOUT COMPLICATION, WITH LONG-TERM CURRENT USE OF INSULIN: ICD-10-CM

## 2025-06-16 DIAGNOSIS — Z79.4 TYPE 2 DIABETES MELLITUS WITHOUT COMPLICATION, WITH LONG-TERM CURRENT USE OF INSULIN: ICD-10-CM

## 2025-06-16 RX ORDER — INSULIN DEGLUDEC 100 U/ML
INJECTION, SOLUTION SUBCUTANEOUS
Qty: 45 ML | Refills: 0 | Status: SHIPPED | OUTPATIENT
Start: 2025-06-16 | End: 2026-06-16

## 2025-06-18 ENCOUNTER — TELEPHONE (OUTPATIENT)
Dept: PRIMARY CARE | Facility: CLINIC | Age: 55
End: 2025-06-18
Payer: MEDICARE

## 2025-06-18 ENCOUNTER — APPOINTMENT (OUTPATIENT)
Dept: ORTHOPEDIC SURGERY | Facility: CLINIC | Age: 55
End: 2025-06-18
Payer: MEDICARE

## 2025-06-18 DIAGNOSIS — M16.12 OSTEOARTHRITIS OF LEFT HIP, UNSPECIFIED OSTEOARTHRITIS TYPE: ICD-10-CM

## 2025-06-18 DIAGNOSIS — S76.012D STRAIN OF GLUTEUS MEDIUS, LEFT, SUBSEQUENT ENCOUNTER: ICD-10-CM

## 2025-06-18 DIAGNOSIS — M47.816 PRIMARY OSTEOARTHRITIS OF LUMBAR SPINE: ICD-10-CM

## 2025-06-18 DIAGNOSIS — S76.312D HAMSTRING STRAIN, LEFT, SUBSEQUENT ENCOUNTER: ICD-10-CM

## 2025-06-18 DIAGNOSIS — S76.012D HIP STRAIN, LEFT, SUBSEQUENT ENCOUNTER: ICD-10-CM

## 2025-06-18 DIAGNOSIS — S76.112D QUADRICEPS STRAIN, LEFT, SUBSEQUENT ENCOUNTER: ICD-10-CM

## 2025-06-18 DIAGNOSIS — M1A.0710 CHRONIC GOUT OF RIGHT FOOT, UNSPECIFIED CAUSE: ICD-10-CM

## 2025-06-18 RX ORDER — ALLOPURINOL 300 MG/1
300 TABLET ORAL NIGHTLY
Qty: 90 TABLET | Refills: 0 | Status: SHIPPED | OUTPATIENT
Start: 2025-06-18

## 2025-06-24 DIAGNOSIS — I10 PRIMARY HYPERTENSION: ICD-10-CM

## 2025-06-24 PROCEDURE — RXMED WILLOW AMBULATORY MEDICATION CHARGE

## 2025-06-24 RX ORDER — LOSARTAN POTASSIUM 50 MG/1
50 TABLET ORAL DAILY
Qty: 100 TABLET | Refills: 0 | Status: SHIPPED | OUTPATIENT
Start: 2025-06-24

## 2025-06-24 ASSESSMENT — ENCOUNTER SYMPTOMS
CONSTITUTIONAL NEGATIVE: 1
MYALGIAS: 1
CARDIOVASCULAR NEGATIVE: 1
RESPIRATORY NEGATIVE: 1
ARTHRALGIAS: 1

## 2025-06-24 NOTE — PROGRESS NOTES
"Established patient  History Of Present Illness  Wilber Pennington \"Wong\" is a 55 y.o. male presenting for his follow up evaluation of his LEFT hip. Since last visit in office, patient states that he feels somewhat improved. Rates current pain as a 2-3/10. Pt has completed two sessions of physical therapy since last visit and has noticed some improvement but has had to be on the road with work. He has been working on home exercises in the mean time. States that he was informed at his initial evaluation when the PT looked at his xrays that physical therapy wasn't likely to help him.  We reviewed patient's previously ordered x-rays in detail.  Patient x-rays show some moderate degenerative disease with narrowing of the bilateral hip spaces.  Patient states that his physical therapy has been helping and has been seeing improvement in his overall pain range of motion and strength.  We discussed various treatment options including regenerative versus therapeutic injections including Prolo/Prolozone as well as PRP and stem cells as well as viscosupplementation injections.  Patient is concerned about cost but will consider injections at subsequent appointments after completing some more physical therapy.  Patient can follow-up in 6 weeks for reevaluation as he has been seeing good improvement and we can always consider further treatment options at that time such as the aforementioned injections as well as more advanced imaging such as MRI or CT is necessary.  Patient verbalizes understanding and agreement with plan of care.    Past Medical History  He has a past medical history of Acute cystitis without hematuria (08/14/2024), Anxiety, Cerebral vascular accident (Multi) (2008), Diabetes mellitus (Multi), Eating disorder, Eczema, Gout, Hypertension, KARINA (obstructive sleep apnea), Pneumonia (08/12/2024), Venous stasis of both lower extremities, Visual impairment, and Yeast infection of the skin (08/12/2024).    Surgical " "History  He has a past surgical history that includes Vasectomy; Eye surgery (Right); and Extracorporeal shock wave lithotripsy (12/2024).     Social History  He reports that he has never smoked. He has never used smokeless tobacco. He reports current alcohol use of about 1.0 standard drink of alcohol per week. He reports that he does not use drugs.    Family History  Family History[1]     Allergies  Patient has no known allergies.    Review of Systems  Review of Systems   Constitutional: Negative.    Respiratory: Negative.     Cardiovascular: Negative.    Musculoskeletal:  Positive for arthralgias and myalgias.   All other systems reviewed and are negative.    Last Recorded Vitals  /78 (BP Location: Right arm, Patient Position: Sitting, BP Cuff Size: Large adult)   Pulse 88   Ht 1.778 m (5' 10\")   Wt (!) 188 kg (415 lb)   BMI 59.55 kg/m²      The , SIMEON JOHNSON was present during today's visit and not limited to physical examination!    Examination:  Left Hip and/or Pelvis  Edema: Negative  Ecchymosis/Bruising: Negative.   Percussion Test: Negative.   Tuning Fork Test: Negative.   Orientation: Symmetrical.     ROM: Positive   Internal Rotation (30-35 degrees) decreased, causes pain (15 degrees)  External Rotation (45-60 degrees)   Flexion (120 degrees) decreased, causes pain (100 degrees)  Extension (15-30 degrees)   ABduction (45-50 degrees)   ADduction (20-30 degrees) decreased, causes pain (0 degrees)  FROM Sacral Flexion and Sacral Extension.            Muscle Strength: Positive   +5/+5 Hamstring Flexion  +5/+5 Quadricep Extension         +5/+5 Hip Flexion   +4/+5Hip Extension Causes pain                  +5/+5 Hip ABduction away from body   +3/+5 Hip ADduction towards body Causes pain          +4/+5 Hip Internal Rotation at 90 Degrees Causes pain-improved  +5/+5 Hip External Rotation at 90 Degrees                  +5/+5 Hip Internal Rotation at 0 Degrees   +5/+5 Hip External Rotation at 0 " Degrees     DTR/Neurological:  Sensation Intact, 2-Point Discrimination: Negative.            Palpation:  Negative-improved           Vascular:   Negative: Normal Pulses   +2/+4, Femoral Artery, DP, PT  Capillary Refill < 2 seconds.            Function Tests:  Test for Rectus Femoris Contracture: Negative.   Hip Extension Test: Positive    Iliotibial Tract Test: Negative.   Walt :  Positive    Walt Test:  Positive    Oneil Test: Positive              Hip/Pelvis - Flexibility:  Hamstring Positive   Hip Flexor  Positive    IT-Band  Negative.         Hip/Pelvis - Hip Contractures:  Finger Tip Test: Negative.   Walt Test: Negative.   Walt  Test: Negative.   Piriformis Test: Negative.       Hip/Pelvis - Hip Dysplasia:  Trochanter Irritation Sign: Negative.   Galeazzi Test: Negative.   Kalchschmidt Test: Negative.            Hip/Pelvis - Impingement/Labrum:  SARITHA Test:  Negative.       FADIR Test:  Positive        Hip Scouring Test:  Positive        Rea's Test:  Positive        Posterior Labrum (Posterior Margin) Test:  Positive        Posterior Impingement (Posterior Labrum) [Torque] Test:  Positive        Anterior Impingement (Anterior Labrum) Test:  Negative.   Psoas Sign: Positive                     Hip/Pelvis - IT Band Syndrome:  Oneil's (Iliotibial Tract) Test: Negative.   Jamison Compression Test: Negative.   J-Sign: Negative.            Hip/Pelvis - SFE:  Drehmann Test: Negative.            Hip/Pelvis - Trochanteric/Pelvis Muscle Function:  Trendelenburg/Duchenne Sign: Negative.   Duchenne Sign: Negative.      Leg Length:  Leg Length Supine: Positive Will verify with standing erect pelvis xray   Leg Length Supine to Seated (Derbolowsky Sign): Positive Will verify with standing erect pelvis xray     Feet/Foot:   Positive BILATERAL Valgus foot     Imaging and Diagnostics Review:  Plain radiograph imaging ordered and independently reviewed.  No acute fractures or dislocations noted however patient  does appear to have degenerative disease of the bilateral hips as well as the lumbar spine.    Assessment   1. Osteoarthritis of left hip, unspecified osteoarthritis type    2. Hip strain, left, subsequent encounter    3. Muscle strain of left gluteal region, subsequent encounter    4. Quadriceps strain, left, subsequent encounter    5. Hamstring strain, left, subsequent encounter    6. Primary osteoarthritis of lumbar spine      Plan   Treatment or Intervention:  May use PRICE therapy as needed.  Continue Physical Therapy 1-2 times a week for 8-10 weeks with manual therapy as well as dry needling and IASTM  Again stressed the importance of wearing shoes with good stability control to help with the biomechanics affecting the lower legs  Again stressed the importance of wearing full foot insoles to help with the biomechanics affecting the lower legs  Recommendation over-the-counter calcium 500mg, 3 times a day with vitamin-D3 9777-8067+ units a day with food as well as a daily multivitamin  Recommendation over-the-counter Move Free for joint health  May take OTC Tylenol Extra Strength or OTC Tylenol Arthritis, taking one every 6-8 hours with food as needed for pain management.  Patient advised regarding the risk and/or potential adverse reactions and/or side effects of any prescribed medications along with any over-the-counter medications or any supplements used. Patient advised to seek immediate medical care if any adverse reactions occur. The patient and/or patient(s) parent(s) verbalized their understanding.  Discussed in detail with the patient to the level of their understanding the possibility in the future of regenerative injections versus corticosteroids injections  Possibility in future of MRI/MR Arthrogram of LEFT HIP to rule out tendon vs ligament vs labral tear vs fracture vs other. MSK to read     Diagnostic studies:  Interpreted By:  Mateo Godwin,   STUDY: XR HIP LEFT WITH PELVIS WHEN PERFORMED 2 OR  3 VIEWS; ;  5/8/2025 2:47 pm      INDICATION:  Signs/Symptoms:LEFT HIP PAIN.      ORDERING CLINICIAN: GURU PETTY      FINDINGS:  Left hip, three views      There is moderate joint space narrowing with osteophytosis in the  left hip. Mild right hip osteophytosis. There is no fracture or  dislocation seen      IMPRESSION:  Moderate left hip osteoarthritis    Signed by: Mateo Godwin 5/9/2025 6:46 PM  Dictation workstation:   [id number]    Activity Instructions, Restrictions, and Accommodations:      Consultations/Referrals:  Physical therapy    Follow-up:  Follow up 4-6 WEEKS  Total appointment time _30_ minutes. Greater than 50% spent counseling patient on results of physical exam, treatment options as well as results of ordered imaging and treatment for results, need for PT and expected outcomes, as well as discussing possible medications.    Guru Petty CNP           [1]   Family History  Problem Relation Name Age of Onset    Heart failure Mother          Congested    Other (cerebrovascular disease) Mother      Hypertension Mother      Heart failure Father Demetris         Congested    Coronary artery disease Father Demetris     Hypertension Father Demetris     COPD Father Demetris     Heart disease Father Demetris     Multiple sclerosis Sister      Other (Gluten sensitive enteropathy) Sister      Hypertension Brother      Other (Gluten sensitive enteropathy) Brother      Other (ADHD) Brother      Celiac disease Brother      Hypertension Brother

## 2025-06-24 NOTE — PATIENT INSTRUCTIONS
May use PRICE therapy as needed.  Continue Physical Therapy 1-2 times a week for 8-10 weeks with manual therapy as well as dry needling and IASTM  Again stressed the importance of wearing shoes with good stability control to help with the biomechanics affecting the lower legs  Again stressed the importance of wearing full foot insoles to help with the biomechanics affecting the lower legs  Recommendation over-the-counter calcium 500mg, 3 times a day with vitamin-D3 8001-5543+ units a day with food as well as a daily multivitamin  Recommendation over-the-counter Move Free for joint health  May take OTC Tylenol Extra Strength or OTC Tylenol Arthritis, taking one every 6-8 hours with food as needed for pain management.  Patient advised regarding the risk and/or potential adverse reactions and/or side effects of any prescribed medications along with any over-the-counter medications or any supplements used. Patient advised to seek immediate medical care if any adverse reactions occur. The patient and/or patient(s) parent(s) verbalized their understanding.  Discussed in detail with the patient to the level of their understanding the possibility in the future of regenerative injections versus corticosteroids injections  Possibility in future of MRI/MR Arthrogram of LEFT HIP to rule out tendon vs ligament vs labral tear vs fracture vs other. MSK to read   Follow up 4-6 WEEKS

## 2025-06-25 ENCOUNTER — PHARMACY VISIT (OUTPATIENT)
Dept: PHARMACY | Facility: CLINIC | Age: 55
End: 2025-06-25
Payer: COMMERCIAL

## 2025-06-25 ENCOUNTER — APPOINTMENT (OUTPATIENT)
Dept: ORTHOPEDIC SURGERY | Facility: CLINIC | Age: 55
End: 2025-06-25
Payer: MEDICARE

## 2025-06-25 VITALS
WEIGHT: 315 LBS | HEIGHT: 70 IN | BODY MASS INDEX: 45.1 KG/M2 | DIASTOLIC BLOOD PRESSURE: 78 MMHG | SYSTOLIC BLOOD PRESSURE: 150 MMHG | HEART RATE: 88 BPM

## 2025-06-25 DIAGNOSIS — M47.816 PRIMARY OSTEOARTHRITIS OF LUMBAR SPINE: ICD-10-CM

## 2025-06-25 DIAGNOSIS — S76.012D HIP STRAIN, LEFT, SUBSEQUENT ENCOUNTER: ICD-10-CM

## 2025-06-25 DIAGNOSIS — S76.012D MUSCLE STRAIN OF LEFT GLUTEAL REGION, SUBSEQUENT ENCOUNTER: ICD-10-CM

## 2025-06-25 DIAGNOSIS — M16.12 OSTEOARTHRITIS OF LEFT HIP, UNSPECIFIED OSTEOARTHRITIS TYPE: ICD-10-CM

## 2025-06-25 DIAGNOSIS — S76.312D HAMSTRING STRAIN, LEFT, SUBSEQUENT ENCOUNTER: ICD-10-CM

## 2025-06-25 DIAGNOSIS — S76.112D QUADRICEPS STRAIN, LEFT, SUBSEQUENT ENCOUNTER: ICD-10-CM

## 2025-06-25 PROCEDURE — 3078F DIAST BP <80 MM HG: CPT | Performed by: NURSE PRACTITIONER

## 2025-06-25 PROCEDURE — 99214 OFFICE O/P EST MOD 30 MIN: CPT | Performed by: NURSE PRACTITIONER

## 2025-06-25 PROCEDURE — 3008F BODY MASS INDEX DOCD: CPT | Performed by: NURSE PRACTITIONER

## 2025-06-25 PROCEDURE — 3077F SYST BP >= 140 MM HG: CPT | Performed by: NURSE PRACTITIONER

## 2025-06-25 PROCEDURE — 1036F TOBACCO NON-USER: CPT | Performed by: NURSE PRACTITIONER

## 2025-06-25 PROCEDURE — 4010F ACE/ARB THERAPY RXD/TAKEN: CPT | Performed by: NURSE PRACTITIONER

## 2025-06-25 PROCEDURE — 3046F HEMOGLOBIN A1C LEVEL >9.0%: CPT | Performed by: NURSE PRACTITIONER

## 2025-06-25 ASSESSMENT — ENCOUNTER SYMPTOMS
LOSS OF SENSATION IN FEET: 0
OCCASIONAL FEELINGS OF UNSTEADINESS: 0
DEPRESSION: 0

## 2025-06-25 ASSESSMENT — PAIN SCALES - GENERAL: PAINLEVEL_OUTOF10: 2

## 2025-06-27 ASSESSMENT — ENCOUNTER SYMPTOMS
PALPITATIONS: 0
CONSTIPATION: 0
UNEXPECTED WEIGHT CHANGE: 0
TREMORS: 0
HEMATURIA: 0
ABDOMINAL PAIN: 0
COUGH: 0
SINUS PAIN: 0
SHORTNESS OF BREATH: 0
WEAKNESS: 0
WHEEZING: 0
SORE THROAT: 0
JOINT SWELLING: 0
NERVOUS/ANXIOUS: 0
FATIGUE: 0
NUMBNESS: 0
BLOOD IN STOOL: 0

## 2025-06-27 NOTE — PROGRESS NOTES
Subjective   Patient ID: Wong Pennington is a 55 y.o. male who presents for Follow-up (3mon follow up).    Hypertension  This is a chronic problem. The current episode started more than 1 year ago. The problem is unchanged. The problem is resistant. Associated symptoms include peripheral edema. Pertinent negatives include no chest pain, palpitations or shortness of breath. Agents associated with hypertension include decongestants and NSAIDs. Risk factors for coronary artery disease include diabetes mellitus, obesity and stress. Compliance problems include diet and exercise.          Has history of hypertension, hyperlipidemia, obstructive sleep apnea on CPAP, gout, anxiety.           H/O diabetes mellitus- Been checking blood sugars daily since ER visit, FBS range from 110-150  Stopped rebylsus early 2022, as it was making his urine color tan. Denied any hematuria. Denied any hypoglycemic episodes. Started Trulicity in mid 2022.  Which he stopped early 2024, as co-pay was too high          Mounjaro since earlier this year 2024    H/O gout- symptoms well controlled on current meds.         H/O KARINA on CPAP- compliant with CPAP machine. Uses it every night. Sees Dr. Shook.         H/O- stress, anxiety. He attributed this to his work. Stated people's problems affect him to a point where he takes personal days off from work. Stated he used to see a psychologist many years ago.    Interim history 08/12/2024     Presented to ER 8/12 with fevers, chills, shortness of breath and hypoxia. Originally thought to have pneumonia, but evaluation revealed UTI and bacteremia. Sepsis protocol started and ID consulted. IV antibiotics given. Patient hyperglycemic on arrival and throughout hospital stay in spite of treatment. Recommended outpatient evaluation by endocrinology. Renal function deteriorated, and nephrology was consulted. Patient found to have non-obstructive kidney stones and scrotal abscess as well. Urology consulted.  Abscess spontaneously drained and CT scan of abdomen revealed no hydronephrosis or concern for obstruction. WBC returned to normal quickly and patient has now been afebrile for about 36 hours. Renal function returned to normal. Cleared for discharge by consultants with oral antibiotics per ID recommendations and follow up with PCP, urology and endocrinology recommended.     Hydrochlorothiazide and lisinopril were stopped during hospitalization due to kidney function      Left hip pain since last 2 months  Doing PT, seeing orthopedics      Review of Systems   Constitutional:  Negative for fatigue and unexpected weight change.   HENT:  Negative for congestion, ear pain, sinus pain and sore throat.    Respiratory:  Negative for cough, shortness of breath and wheezing.    Cardiovascular:  Negative for chest pain, palpitations and leg swelling.   Gastrointestinal:  Negative for abdominal pain, blood in stool and constipation.   Genitourinary:  Negative for hematuria and urgency.   Musculoskeletal:  Positive for arthralgias. Negative for joint swelling.   Skin:  Negative for rash.   Neurological:  Negative for tremors, syncope, weakness and numbness.   Psychiatric/Behavioral:  Positive for behavioral problems. The patient is not nervous/anxious.        Objective   /84 (BP Location: Left arm, Patient Position: Sitting, BP Cuff Size: Adult)   Pulse 95   Temp 36.8 °C (98.3 °F) (Temporal)   Wt (!) 191 kg (420 lb)   SpO2 96%   BMI 60.26 kg/m²     Physical Exam  Constitutional:       Appearance: Normal appearance. He is obese.   HENT:      Head: Normocephalic and atraumatic.   Eyes:      Extraocular Movements: Extraocular movements intact.      Conjunctiva/sclera: Conjunctivae normal.   Cardiovascular:      Rate and Rhythm: Normal rate and regular rhythm.      Heart sounds: No murmur heard.  Pulmonary:      Effort: Pulmonary effort is normal. No respiratory distress.      Breath sounds: Normal breath sounds.  "  Abdominal:      General: Abdomen is flat. Bowel sounds are normal.      Palpations: Abdomen is soft.   Musculoskeletal:      Right lower leg: No edema.      Left lower leg: No edema.      Comments: Chronic venous stasis changes bilaterally   Lymphadenopathy:      Cervical: No cervical adenopathy.   Neurological:      Mental Status: He is alert and oriented to person, place, and time.      Cranial Nerves: No cranial nerve deficit.   Psychiatric:         Mood and Affect: Mood normal.         Assessment/Plan        Wilber \"Wong\" was seen today for follow-up.  Diagnoses and all orders for this visit:  Type 2 diabetes mellitus without complication, without long-term current use of insulin (Primary)  -     tirzepatide (Mounjaro) 7.5 mg/0.5 mL pen injector; Inject 7.5 mg under the skin 1 (one) time per week.  -     Albumin-Creatinine Ratio, Urine Random  Primary hypertension  -     losartan (Cozaar) 100 mg tablet; Take 1 tablet (100 mg) by mouth once daily.  KARINA (obstructive sleep apnea)  Mixed hyperlipidemia  Acute hip pain, left  Class 3 severe obesity with serious comorbidity and body mass index (BMI) of 60.0 to 69.9 in adult, unspecified obesity type        Lab Results   Component Value Date    HGBA1C 9.1 (H) 06/27/2025        Increased losartan to 100mg from 50 mg   Increased Mounjaro to 7.5 mg from 5 mg    Reviewed recent labs with patient    Recommended weight loss, advised to limit daily calories to less than 1800 malena a day    Using CPAP every night      Lab Results   Component Value Date    WBC 9.8 06/27/2025    HGB 13.2 06/27/2025    HCT 39.9 06/27/2025    MCV 85.6 06/27/2025     06/27/2025     Lab Results   Component Value Date    GLUCOSE 185 (H) 06/27/2025    CALCIUM 9.2 06/27/2025     06/27/2025    K 4.2 06/27/2025    CO2 27 06/27/2025     06/27/2025    BUN 27 (H) 06/27/2025    CREATININE 0.99 06/27/2025     Lab Results   Component Value Date    CHOL 161 06/27/2025    CHOL 119 (L) 06/29/2023 " "   CHOL 141 11/12/2021     Lab Results   Component Value Date    HDL 39 (L) 06/27/2025    HDL 43 06/29/2023    HDL 43 11/12/2021     Lab Results   Component Value Date    LDLCALC 84 06/27/2025    LDLCALC 44 (L) 06/29/2023    LDLCALC 67 11/12/2021     Lab Results   Component Value Date    TRIG 292 (H) 06/27/2025    TRIG 162 (H) 06/29/2023    TRIG 154 (H) 11/12/2021     No components found for: \"CHOLHDL\"      Current Outpatient Medications   Medication Instructions    albuterol (Ventolin HFA) 90 mcg/actuation inhaler 2 puffs, inhalation, Every 4 hours PRN    allopurinol (ZYLOPRIM) 300 mg, oral, Nightly    amLODIPine (NORVASC) 10 mg, oral, Daily    aspirin 81 mg, Daily    atorvastatin (LIPITOR) 10 mg, oral, Daily    blood-glucose sensor (FreeStyle Kwasi 3 Plus Sensor) device Use to monitor glucose, change every 15 days    busPIRone (BUSPAR) 10 mg, oral, 2 times daily    cholecalciferol (VITAMIN D-3) 1,000 Units, 2 times daily    cyanocobalamin (VITAMIN B-12) 1,000 mcg, Daily    fluticasone (Flonase) 50 mcg/actuation nasal spray INSTILL 1 SPRAY INTO EACH NOSTRIL ONE TIME DAILY AS NEEDED    glucosam/chond/hyalu/CF borate (MOVE FREE [facility] ORAL) 1 tablet, 2 times daily    hydroCHLOROthiazide (HYDRODIURIL) 50 mg, oral, Daily    insulin degludec (Tresiba FlexTouch U-100) 100 unit/mL (3 mL) pen INJECT 70 UNITS SUBCUTANEOUSLY ONE TIME DAILY AS DIRECTED    krill/om-3/dha/epa/phospho/ast (MEGARED OMEGA-3 KRILL OIL ORAL) 500 mg, Daily    losartan (COZAAR) 100 mg, oral, Daily    metFORMIN (GLUCOPHAGE) 1,000 mg, oral, 2 times daily (morning and late afternoon)    metoprolol succinate XL (TOPROL-XL) 125 mg, oral, Daily, Do not crush or chew.    miscellaneous medical supply [facility] CPAP mask and supplies; 14 cmH2O with HH and a Respironics dreamwear medium FFM    Mounjaro 7.5 mg, subcutaneous, Weekly    multivitamin with minerals iron-free (Centrum Silver) 1 tablet, Daily    nystatin (Mycostatin) 100,000 unit/gram powder Apply 1 " application topically 2 times a day.    potassium chloride CR (Klor-Con M10) 10 mEq ER tablet 10 mEq, oral, 2 times daily after meals

## 2025-06-28 LAB
25(OH)D3+25(OH)D2 SERPL-MCNC: 34 NG/ML (ref 30–100)
ALBUMIN SERPL-MCNC: 3.6 G/DL (ref 3.6–5.1)
ALP SERPL-CCNC: 62 U/L (ref 35–144)
ALT SERPL-CCNC: 23 U/L (ref 9–46)
ANION GAP SERPL CALCULATED.4IONS-SCNC: 9 MMOL/L (CALC) (ref 7–17)
AST SERPL-CCNC: 21 U/L (ref 10–35)
BASOPHILS # BLD AUTO: 88 CELLS/UL (ref 0–200)
BASOPHILS NFR BLD AUTO: 0.9 %
BILIRUB SERPL-MCNC: 0.4 MG/DL (ref 0.2–1.2)
BUN SERPL-MCNC: 27 MG/DL (ref 7–25)
CALCIUM SERPL-MCNC: 9.2 MG/DL (ref 8.6–10.3)
CHLORIDE SERPL-SCNC: 102 MMOL/L (ref 98–110)
CHOLEST SERPL-MCNC: 161 MG/DL
CHOLEST/HDLC SERPL: 4.1 (CALC)
CO2 SERPL-SCNC: 27 MMOL/L (ref 20–32)
CREAT SERPL-MCNC: 0.99 MG/DL (ref 0.7–1.3)
EGFRCR SERPLBLD CKD-EPI 2021: 89 ML/MIN/1.73M2
EOSINOPHIL # BLD AUTO: 715 CELLS/UL (ref 15–500)
EOSINOPHIL NFR BLD AUTO: 7.3 %
ERYTHROCYTE [DISTWIDTH] IN BLOOD BY AUTOMATED COUNT: 14 % (ref 11–15)
EST. AVERAGE GLUCOSE BLD GHB EST-MCNC: 214 MG/DL
EST. AVERAGE GLUCOSE BLD GHB EST-SCNC: 11.9 MMOL/L
GLUCOSE SERPL-MCNC: 185 MG/DL (ref 65–99)
HBA1C MFR BLD: 9.1 %
HCT VFR BLD AUTO: 39.9 % (ref 38.5–50)
HDLC SERPL-MCNC: 39 MG/DL
HGB BLD-MCNC: 13.2 G/DL (ref 13.2–17.1)
LDLC SERPL CALC-MCNC: 84 MG/DL (CALC)
LYMPHOCYTES # BLD AUTO: 1784 CELLS/UL (ref 850–3900)
LYMPHOCYTES NFR BLD AUTO: 18.2 %
MCH RBC QN AUTO: 28.3 PG (ref 27–33)
MCHC RBC AUTO-ENTMCNC: 33.1 G/DL (ref 32–36)
MCV RBC AUTO: 85.6 FL (ref 80–100)
MONOCYTES # BLD AUTO: 755 CELLS/UL (ref 200–950)
MONOCYTES NFR BLD AUTO: 7.7 %
NEUTROPHILS # BLD AUTO: 6458 CELLS/UL (ref 1500–7800)
NEUTROPHILS NFR BLD AUTO: 65.9 %
NONHDLC SERPL-MCNC: 122 MG/DL (CALC)
PLATELET # BLD AUTO: 279 THOUSAND/UL (ref 140–400)
PMV BLD REES-ECKER: 9.8 FL (ref 7.5–12.5)
POTASSIUM SERPL-SCNC: 4.2 MMOL/L (ref 3.5–5.3)
PROT SERPL-MCNC: 6.8 G/DL (ref 6.1–8.1)
PSA SERPL-MCNC: 0.31 NG/ML
RBC # BLD AUTO: 4.66 MILLION/UL (ref 4.2–5.8)
SODIUM SERPL-SCNC: 138 MMOL/L (ref 135–146)
TRIGL SERPL-MCNC: 292 MG/DL
TSH SERPL-ACNC: 1.55 MIU/L (ref 0.4–4.5)
WBC # BLD AUTO: 9.8 THOUSAND/UL (ref 3.8–10.8)

## 2025-06-29 ASSESSMENT — ENCOUNTER SYMPTOMS: HYPERTENSION: 1

## 2025-06-30 ENCOUNTER — APPOINTMENT (OUTPATIENT)
Dept: PRIMARY CARE | Facility: CLINIC | Age: 55
End: 2025-06-30
Payer: MEDICARE

## 2025-06-30 VITALS
HEART RATE: 95 BPM | DIASTOLIC BLOOD PRESSURE: 84 MMHG | TEMPERATURE: 98.3 F | BODY MASS INDEX: 60.26 KG/M2 | OXYGEN SATURATION: 96 % | SYSTOLIC BLOOD PRESSURE: 162 MMHG | WEIGHT: 315 LBS

## 2025-06-30 DIAGNOSIS — E66.813 CLASS 3 SEVERE OBESITY WITH SERIOUS COMORBIDITY AND BODY MASS INDEX (BMI) OF 60.0 TO 69.9 IN ADULT, UNSPECIFIED OBESITY TYPE: ICD-10-CM

## 2025-06-30 DIAGNOSIS — M25.552 ACUTE HIP PAIN, LEFT: ICD-10-CM

## 2025-06-30 DIAGNOSIS — E11.9 TYPE 2 DIABETES MELLITUS WITHOUT COMPLICATION, WITHOUT LONG-TERM CURRENT USE OF INSULIN: Primary | ICD-10-CM

## 2025-06-30 DIAGNOSIS — E78.2 MIXED HYPERLIPIDEMIA: ICD-10-CM

## 2025-06-30 DIAGNOSIS — G47.33 OSA (OBSTRUCTIVE SLEEP APNEA): ICD-10-CM

## 2025-06-30 DIAGNOSIS — I10 PRIMARY HYPERTENSION: ICD-10-CM

## 2025-06-30 PROCEDURE — 3077F SYST BP >= 140 MM HG: CPT | Performed by: INTERNAL MEDICINE

## 2025-06-30 PROCEDURE — 99214 OFFICE O/P EST MOD 30 MIN: CPT | Performed by: INTERNAL MEDICINE

## 2025-06-30 PROCEDURE — 1036F TOBACCO NON-USER: CPT | Performed by: INTERNAL MEDICINE

## 2025-06-30 PROCEDURE — 4010F ACE/ARB THERAPY RXD/TAKEN: CPT | Performed by: INTERNAL MEDICINE

## 2025-06-30 PROCEDURE — 3079F DIAST BP 80-89 MM HG: CPT | Performed by: INTERNAL MEDICINE

## 2025-06-30 PROCEDURE — 3046F HEMOGLOBIN A1C LEVEL >9.0%: CPT | Performed by: INTERNAL MEDICINE

## 2025-06-30 RX ORDER — LOSARTAN POTASSIUM 100 MG/1
100 TABLET ORAL DAILY
Qty: 90 TABLET | Refills: 1 | Status: SHIPPED | OUTPATIENT
Start: 2025-06-30

## 2025-06-30 RX ORDER — TIRZEPATIDE 7.5 MG/.5ML
7.5 INJECTION, SOLUTION SUBCUTANEOUS WEEKLY
Qty: 2 ML | Refills: 2 | Status: SHIPPED | OUTPATIENT
Start: 2025-06-30

## 2025-06-30 ASSESSMENT — PAIN SCALES - GENERAL: PAINLEVEL_OUTOF10: 8

## 2025-06-30 ASSESSMENT — ENCOUNTER SYMPTOMS
DEPRESSION: 0
HYPERTENSION: 1
LOSS OF SENSATION IN FEET: 0
OCCASIONAL FEELINGS OF UNSTEADINESS: 0
ARTHRALGIAS: 1

## 2025-06-30 NOTE — PROGRESS NOTES
Answers submitted by the patient for this visit:  High Blood Pressure Questionnaire (Submitted on 6/29/2025)  Chief Complaint: Hypertension  Chronicity: chronic  Onset: more than 1 year ago  Progression since onset: unchanged  Condition status: resistant  peripheral edema: Yes  Agents associated with hypertension: decongestants, NSAIDs  CAD risks: diabetes mellitus, obesity, stress  Compliance problems: diet, exercise

## 2025-07-01 LAB
ALBUMIN/CREAT UR: 4263 MG/G CREAT
CREAT UR-MCNC: 95 MG/DL (ref 20–320)
MICROALBUMIN UR-MCNC: 405 MG/DL

## 2025-07-02 PROCEDURE — RXMED WILLOW AMBULATORY MEDICATION CHARGE

## 2025-07-03 ENCOUNTER — PHARMACY VISIT (OUTPATIENT)
Dept: PHARMACY | Facility: CLINIC | Age: 55
End: 2025-07-03
Payer: COMMERCIAL

## 2025-07-17 PROCEDURE — RXMED WILLOW AMBULATORY MEDICATION CHARGE

## 2025-07-19 DIAGNOSIS — E11.9 TYPE 2 DIABETES MELLITUS WITHOUT COMPLICATION, WITHOUT LONG-TERM CURRENT USE OF INSULIN: ICD-10-CM

## 2025-07-21 PROCEDURE — RXMED WILLOW AMBULATORY MEDICATION CHARGE

## 2025-07-21 RX ORDER — METFORMIN HYDROCHLORIDE 500 MG/1
TABLET ORAL
Qty: 360 TABLET | Refills: 3 | Status: SHIPPED | OUTPATIENT
Start: 2025-07-21

## 2025-07-22 PROCEDURE — RXMED WILLOW AMBULATORY MEDICATION CHARGE

## 2025-07-23 ENCOUNTER — PHARMACY VISIT (OUTPATIENT)
Dept: PHARMACY | Facility: CLINIC | Age: 55
End: 2025-07-23
Payer: COMMERCIAL

## 2025-07-31 ENCOUNTER — TELEPHONE (OUTPATIENT)
Dept: PRIMARY CARE | Facility: CLINIC | Age: 55
End: 2025-07-31
Payer: MEDICARE

## 2025-07-31 DIAGNOSIS — I10 PRIMARY HYPERTENSION: ICD-10-CM

## 2025-07-31 RX ORDER — ATORVASTATIN CALCIUM 10 MG/1
10 TABLET, FILM COATED ORAL DAILY
Qty: 90 TABLET | Refills: 2 | Status: SHIPPED | OUTPATIENT
Start: 2025-07-31

## 2025-08-06 ENCOUNTER — APPOINTMENT (OUTPATIENT)
Dept: ORTHOPEDIC SURGERY | Facility: CLINIC | Age: 55
End: 2025-08-06
Payer: MEDICARE

## 2025-08-15 PROCEDURE — RXMED WILLOW AMBULATORY MEDICATION CHARGE

## 2025-08-18 ENCOUNTER — PHARMACY VISIT (OUTPATIENT)
Dept: PHARMACY | Facility: CLINIC | Age: 55
End: 2025-08-18
Payer: COMMERCIAL

## 2025-08-18 ASSESSMENT — ENCOUNTER SYMPTOMS
ARTHRALGIAS: 1
CARDIOVASCULAR NEGATIVE: 1
CONSTITUTIONAL NEGATIVE: 1
RESPIRATORY NEGATIVE: 1
MYALGIAS: 1

## 2025-08-20 ENCOUNTER — APPOINTMENT (OUTPATIENT)
Dept: ORTHOPEDIC SURGERY | Facility: CLINIC | Age: 55
End: 2025-08-20
Payer: MEDICARE

## 2025-08-20 DIAGNOSIS — M16.12 OSTEOARTHRITIS OF LEFT HIP, UNSPECIFIED OSTEOARTHRITIS TYPE: ICD-10-CM

## 2025-08-20 DIAGNOSIS — S76.312D HAMSTRING STRAIN, LEFT, SUBSEQUENT ENCOUNTER: ICD-10-CM

## 2025-08-20 DIAGNOSIS — S76.012D MUSCLE STRAIN OF LEFT GLUTEAL REGION, SUBSEQUENT ENCOUNTER: ICD-10-CM

## 2025-08-20 DIAGNOSIS — S76.012D HIP STRAIN, LEFT, SUBSEQUENT ENCOUNTER: ICD-10-CM

## 2025-08-20 DIAGNOSIS — S76.112D QUADRICEPS STRAIN, LEFT, SUBSEQUENT ENCOUNTER: ICD-10-CM

## 2025-08-20 DIAGNOSIS — M47.816 PRIMARY OSTEOARTHRITIS OF LUMBAR SPINE: ICD-10-CM

## 2025-08-25 DIAGNOSIS — I10 PRIMARY HYPERTENSION: ICD-10-CM

## 2025-08-25 PROCEDURE — RXMED WILLOW AMBULATORY MEDICATION CHARGE

## 2025-08-25 RX ORDER — METOPROLOL SUCCINATE 50 MG/1
TABLET, EXTENDED RELEASE ORAL
Qty: 225 TABLET | Refills: 0 | Status: SHIPPED | OUTPATIENT
Start: 2025-08-25

## 2025-08-26 ENCOUNTER — PHARMACY VISIT (OUTPATIENT)
Dept: PHARMACY | Facility: CLINIC | Age: 55
End: 2025-08-26
Payer: COMMERCIAL

## 2025-08-27 DIAGNOSIS — I10 ESSENTIAL HYPERTENSION: ICD-10-CM

## 2025-08-27 RX ORDER — POTASSIUM CHLORIDE 750 MG/1
10 TABLET, EXTENDED RELEASE ORAL
Qty: 180 TABLET | Refills: 1 | Status: SHIPPED | OUTPATIENT
Start: 2025-08-27

## 2025-08-29 DIAGNOSIS — I10 PRIMARY HYPERTENSION: ICD-10-CM

## 2025-08-29 RX ORDER — HYDROCHLOROTHIAZIDE 25 MG/1
50 TABLET ORAL DAILY
Qty: 180 TABLET | Refills: 0 | Status: SHIPPED | OUTPATIENT
Start: 2025-08-29

## 2025-09-02 DIAGNOSIS — E11.9 TYPE 2 DIABETES MELLITUS WITHOUT COMPLICATION, WITH LONG-TERM CURRENT USE OF INSULIN: ICD-10-CM

## 2025-09-02 DIAGNOSIS — Z79.4 TYPE 2 DIABETES MELLITUS WITHOUT COMPLICATION, WITH LONG-TERM CURRENT USE OF INSULIN: ICD-10-CM

## 2025-09-02 RX ORDER — INSULIN DEGLUDEC 100 U/ML
INJECTION, SOLUTION SUBCUTANEOUS
Qty: 45 ML | Refills: 1 | Status: SHIPPED | OUTPATIENT
Start: 2025-09-02 | End: 2026-09-02

## 2025-09-02 RX ORDER — INSULIN DEGLUDEC 100 U/ML
INJECTION, SOLUTION SUBCUTANEOUS
Qty: 45 ML | Refills: 1 | Status: CANCELLED | OUTPATIENT
Start: 2025-09-02 | End: 2026-09-02

## 2025-09-02 ASSESSMENT — ENCOUNTER SYMPTOMS
CONSTITUTIONAL NEGATIVE: 1
ARTHRALGIAS: 1
RESPIRATORY NEGATIVE: 1
CARDIOVASCULAR NEGATIVE: 1
MYALGIAS: 1

## 2025-09-04 ENCOUNTER — APPOINTMENT (OUTPATIENT)
Dept: ORTHOPEDIC SURGERY | Facility: CLINIC | Age: 55
End: 2025-09-04
Payer: MEDICARE

## 2025-09-04 PROCEDURE — RXMED WILLOW AMBULATORY MEDICATION CHARGE

## 2025-09-04 ASSESSMENT — PAIN SCALES - GENERAL: PAINLEVEL_OUTOF10: 4

## 2025-09-06 ENCOUNTER — PHARMACY VISIT (OUTPATIENT)
Dept: PHARMACY | Facility: CLINIC | Age: 55
End: 2025-09-06
Payer: COMMERCIAL

## (undated) DEVICE — BLANKET, LOWER BODY, VHA PLUS, ADULT

## (undated) DEVICE — SLEEVE, VASO PRESS, CALF GARMENT, MEDIUM, GREEN

## (undated) DEVICE — Device

## (undated) DEVICE — GOWN, SURGICAL, SIRUS, NON REINFORCED, LARGE

## (undated) DEVICE — GLOVES, SURG BIOGEL, SZ-7.0, PF, LF

## (undated) DEVICE — IRRIGATION SET, CYSTOSCOPY, F/CONSTANT/INTERMITTENT, 8 GTT/CC, 77 IN